# Patient Record
Sex: FEMALE | Race: NATIVE HAWAIIAN OR OTHER PACIFIC ISLANDER | NOT HISPANIC OR LATINO | Employment: UNEMPLOYED | URBAN - METROPOLITAN AREA
[De-identification: names, ages, dates, MRNs, and addresses within clinical notes are randomized per-mention and may not be internally consistent; named-entity substitution may affect disease eponyms.]

---

## 2017-01-07 ENCOUNTER — HOSPITAL ENCOUNTER (EMERGENCY)
Facility: MEDICAL CENTER | Age: 32
End: 2017-01-07
Attending: EMERGENCY MEDICINE
Payer: MEDICAID

## 2017-01-07 ENCOUNTER — APPOINTMENT (OUTPATIENT)
Dept: RADIOLOGY | Facility: MEDICAL CENTER | Age: 32
End: 2017-01-07
Attending: EMERGENCY MEDICINE
Payer: MEDICAID

## 2017-01-07 VITALS
DIASTOLIC BLOOD PRESSURE: 53 MMHG | HEIGHT: 65 IN | BODY MASS INDEX: 25.99 KG/M2 | WEIGHT: 156 LBS | OXYGEN SATURATION: 96 % | RESPIRATION RATE: 24 BRPM | TEMPERATURE: 99 F | HEART RATE: 83 BPM | SYSTOLIC BLOOD PRESSURE: 114 MMHG

## 2017-01-07 DIAGNOSIS — J01.00 ACUTE MAXILLARY SINUSITIS, RECURRENCE NOT SPECIFIED: ICD-10-CM

## 2017-01-07 DIAGNOSIS — F41.9 ANXIETY: ICD-10-CM

## 2017-01-07 DIAGNOSIS — J40 BRONCHITIS: ICD-10-CM

## 2017-01-07 LAB
BLOOD CULTURE HOLD CXBCH: NORMAL
EKG IMPRESSION: NORMAL
FLUAV+FLUBV AG SPEC QL IA: NORMAL
HCG UR QL: NEGATIVE
SIGNIFICANT IND 70042: NORMAL
SITE SITE: NORMAL
SOURCE SOURCE: NORMAL

## 2017-01-07 PROCEDURE — 71010 DX-CHEST-PORTABLE (1 VIEW): CPT

## 2017-01-07 PROCEDURE — 87400 INFLUENZA A/B EACH AG IA: CPT

## 2017-01-07 PROCEDURE — 99284 EMERGENCY DEPT VISIT MOD MDM: CPT

## 2017-01-07 PROCEDURE — A9270 NON-COVERED ITEM OR SERVICE: HCPCS | Performed by: EMERGENCY MEDICINE

## 2017-01-07 PROCEDURE — 700102 HCHG RX REV CODE 250 W/ 637 OVERRIDE(OP): Performed by: EMERGENCY MEDICINE

## 2017-01-07 PROCEDURE — 81025 URINE PREGNANCY TEST: CPT

## 2017-01-07 PROCEDURE — 93005 ELECTROCARDIOGRAM TRACING: CPT

## 2017-01-07 RX ORDER — AZITHROMYCIN 250 MG/1
500 TABLET, FILM COATED ORAL ONCE
Status: COMPLETED | OUTPATIENT
Start: 2017-01-07 | End: 2017-01-07

## 2017-01-07 RX ORDER — PROMETHAZINE HYDROCHLORIDE AND CODEINE PHOSPHATE 6.25; 1 MG/5ML; MG/5ML
5 SYRUP ORAL 4 TIMES DAILY PRN
Qty: 240 ML | Refills: 0 | Status: SHIPPED | OUTPATIENT
Start: 2017-01-07 | End: 2017-03-18

## 2017-01-07 RX ORDER — AZITHROMYCIN 250 MG/1
TABLET, FILM COATED ORAL
Qty: 6 TAB | Refills: 0 | Status: SHIPPED | OUTPATIENT
Start: 2017-01-07 | End: 2017-03-18

## 2017-01-07 RX ADMIN — AZITHROMYCIN 500 MG: 250 TABLET, FILM COATED ORAL at 20:50

## 2017-01-07 ASSESSMENT — PAIN SCALES - GENERAL: PAINLEVEL_OUTOF10: 4

## 2017-01-07 NOTE — ED AVS SNAPSHOT
After Visit Summary                                                                                                                Angelina Rader   MRN: [id number]    Department:  [facility], Emergency Dept   Date of Visit:  1/7/2017            [facility], Emergency Dept    [address]    [place] [address]    Phone:  [phone]      You were seen by     Jay Medley D.O.      Your Diagnosis Was     Acute maxillary sinusitis, recurrence not specified     J01.00       These are the medications you received during your hospitalization from 01/07/2017 1850 to 01/07/2017 2053     Date/Time Order Dose Route Action    01/07/2017 2050 azithromycin (ZITHROMAX) tablet 500 mg 500 mg Oral Given      Follow-up Information     1. Please follow up.    Why:  Follow up with your physician      Medication Information     Review all of your home medications and newly ordered medications with your primary doctor and/or pharmacist as soon as possible. Follow medication instructions as directed by your doctor and/or pharmacist.     Please keep your complete medication list with you and share with your physician. Update the information when medications are discontinued, doses are changed, or new medications (including over-the-counter products) are added; and carry medication information at all times in the event of emergency situations.               Medication List      START taking these medications        Instructions    azithromycin 250 MG Tabs   Commonly known as:  ZITHROMAX    Take two tabs by mouth on day one, then one tab by mouth daily on days 2-5.       promethazine-codeine 6.25-10 MG/5ML Syrp   Commonly known as:  PHENERGAN-CODEINE    Take 5 mL by mouth 4 times a day as needed for Cough.   Dose:  5 mL               Procedures and tests performed during your visit     BLOOD CULTURE,HOLD    DX-CHEST-PORTABLE (1 VIEW)    EKG (ER)    INFLUENZA RAPID    POC URINE PREGNANCY             Discharge Instructions       Bronchitis  Bronchitis is a problem of the air tubes leading to your lungs. This problem makes it hard for air to get in and out of the lungs. You may cough a lot because your air tubes are narrow. Going without care can cause lasting (chronic) bronchitis.  HOME CARE   · Drink enough fluids to keep your pee (urine) clear or pale yellow.  · Use a cool mist humidifier.  · Quit smoking if you smoke. If you keep smoking, the bronchitis might not get better.  · Only take medicine as told by your doctor.  GET HELP RIGHT AWAY IF:   · Coughing keeps you awake.  · You start to wheeze.  · You become more sick or weak.  · You have a hard time breathing or get short of breath.  · You cough up blood.  · Coughing lasts more than 2 weeks.  · You have a fever.  · Your baby is older than 3 months with a rectal temperature of 102° F (38.9° C) or higher.  · Your baby is 3 months old or younger with a rectal temperature of 100.4° F (38° C) or higher.  MAKE SURE YOU:  · Understand these instructions.  · Will watch your condition.  · Will get help right away if you are not doing well or get worse.  Document Released: 06/05/2009 Document Revised: 03/11/2013 Document Reviewed: 11/19/2010  ExitCare® Patient Information ©2014 [employer].    Sinusitis, Adult  Sinusitis is redness, soreness, and inflammation of the paranasal sinuses. Paranasal sinuses are air pockets within the bones of your face. They are located beneath your eyes, in the middle of your forehead, and above your eyes. In healthy paranasal sinuses, mucus is able to drain out, and air is able to circulate through them by way of your nose. However, when your paranasal sinuses are inflamed, mucus and air can become trapped. This can allow bacteria and other germs to grow and cause infection.  Sinusitis can develop quickly and last only a short time (acute) or continue over a long period (chronic). Sinusitis that lasts for more than 12 weeks is  considered chronic.  CAUSES  Causes of sinusitis include:  · Allergies.  · Structural abnormalities, such as displacement of the cartilage that separates your nostrils (deviated septum), which can decrease the air flow through your nose and sinuses and affect sinus drainage.  · Functional abnormalities, such as when the small hairs (cilia) that line your sinuses and help remove mucus do not work properly or are not present.  SIGNS AND SYMPTOMS  Symptoms of acute and chronic sinusitis are the same. The primary symptoms are pain and pressure around the affected sinuses. Other symptoms include:  · Upper toothache.  · Earache.  · Headache.  · Bad breath.  · Decreased sense of smell and taste.  · A cough, which worsens when you are lying flat.  · Fatigue.  · Fever.  · Thick drainage from your nose, which often is green and may contain pus (purulent).  · Swelling and warmth over the affected sinuses.  DIAGNOSIS  Your health care provider will perform a physical exam. During your exam, your health care provider may perform any of the following to help determine if you have acute sinusitis or chronic sinusitis:  · Look in your nose for signs of abnormal growths in your nostrils (nasal polyps).  · Tap over the affected sinus to check for signs of infection.  · View the inside of your sinuses using an imaging device that has a light attached (endoscope).  If your health care provider suspects that you have chronic sinusitis, one or more of the following tests may be recommended:  · Allergy tests.  · Nasal culture. A sample of mucus is taken from your nose, sent to a lab, and screened for bacteria.  · Nasal cytology. A sample of mucus is taken from your nose and examined by your health care provider to determine if your sinusitis is related to an allergy.  TREATMENT  Most cases of acute sinusitis are related to a viral infection and will resolve on their own within 10 days. Sometimes, medicines are prescribed to help relieve  symptoms of both acute and chronic sinusitis. These may include pain medicines, decongestants, nasal steroid sprays, or saline sprays.  However, for sinusitis related to a bacterial infection, your health care provider will prescribe antibiotic medicines. These are medicines that will help kill the bacteria causing the infection.  Rarely, sinusitis is caused by a fungal infection. In these cases, your health care provider will prescribe antifungal medicine.  For some cases of chronic sinusitis, surgery is needed. Generally, these are cases in which sinusitis recurs more than 3 times per year, despite other treatments.  HOME CARE INSTRUCTIONS  · Drink plenty of water. Water helps thin the mucus so your sinuses can drain more easily.  · Use a humidifier.  · Inhale steam 3-4 times a day (for example, sit in the bathroom with the shower running).  · Apply a warm, moist washcloth to your face 3-4 times a day, or as directed by your health care provider.  · Use saline nasal sprays to help moisten and clean your sinuses.  · Take medicines only as directed by your health care provider.  · If you were prescribed either an antibiotic or antifungal medicine, finish it all even if you start to feel better.  SEEK IMMEDIATE MEDICAL CARE IF:  · You have increasing pain or severe headaches.  · You have nausea, vomiting, or drowsiness.  · You have swelling around your face.  · You have vision problems.  · You have a stiff neck.  · You have difficulty breathing.     This information is not intended to replace advice given to you by your health care provider. Make sure you discuss any questions you have with your health care provider.     Document Released: 12/18/2006 Document Revised: 01/08/2016 Document Reviewed: 01/01/2013  Elsevier Interactive Patient Education ©2016 Elsevier Inc.            Patient Information     Patient Information    Following emergency treatment: all patient requiring follow-up care must return either to a  private physician or a clinic if your condition worsens before you are able to obtain further medical attention, please return to the emergency room.     Billing Information    At [facility], we work to make the billing process streamlined for our patients.  Our Representatives are here to answer any questions you may have regarding your hospital bill.  If you have insurance coverage and have supplied your insurance information to us, we will submit a claim to your insurer on your behalf.  Should you have any questions regarding your bill, we can be reached online or by phone as follows:  Online: You are able pay your bills online or live chat with our representatives about any billing questions you may have. We are here to help Monday - Friday from 8:00am to 7:30pm and 9:00am - 12:00pm on Saturdays.  Please visit [email or web address]  for more information.   Phone:  [phone] or [phone]    Please note that your emergency physician, surgeon, pathologist, radiologist, anesthesiologist, and other specialists are not employed by [facility] and will therefore bill separately for their services.  Please contact them directly for any questions concerning their bills at the numbers below:     Emergency Physician Services:  [phone]  [facility]:  [phone]  [facility]:  [phone]  [facility]:  [phone]    1. Your final bill may vary from the amount quoted upon discharge if all procedures are not complete at that time, or if your doctor has additional procedures of which we are not aware. You will receive an additional bill if you return to the Emergency Department at [facility] for suture removal regardless of the facility of which the sutures were placed.     2. Please arrange for settlement of this account at the emergency registration.    3. All self-pay accounts are due in full at the time of treatment.  If you  are unable to meet this obligation then payment is expected within 4-5 days.     4. If you have had radiology studies (CT, X-ray, Ultrasound, MRI), you have received a preliminary result during your emergency department visit. Please contact the radiology department [phone] to receive a copy of your final result. Please discuss the Final result with your primary physician or with the follow up physician provided.     Crisis Hotline:  [place] Crisis Hotline:  3-593-UXEXIME or 1-849.713.5731  [place] Crisis Hotline:    1-126.779.4717 or 650-180-0541         ED Discharge Follow Up Questions    1. In order to provide you with very good care, we would like to follow up with a phone call in the next few days.  May we have your permission to contact you?     YES /  NO    2. What is the best phone number to call you? (       )_____-__________    3. What is the best time to call you?      Morning  /  Afternoon  /  Evening                   Patient Signature:  ____________________________________________________________    Date:  ____________________________________________________________

## 2017-01-07 NOTE — ED AVS SNAPSHOT
1/7/2017          Angelina Rader  [address]  [place] [address]    Dear Angelina:    [facility] wants to ensure your discharge home is safe and you or your loved ones have had all your questions answered regarding your care after you leave the hospital.    You may receive a telephone call within two days of your discharge.  This call is to make certain you understand your discharge instructions as well as ensure we provided you with the best care possible during your stay with us.     The call will only last approximately 3-5 minutes and will be done by a nurse.    Once again, we want to ensure your discharge home is safe and that you have a clear understanding of any next steps in your care.  If you have any questions or concerns, please do not hesitate to contact us, we are here for you.  Thank you for choosing [facility] for your healthcare needs.    Sincerely,    Reno Massey    [facility]

## 2017-01-07 NOTE — ED AVS SNAPSHOT
CinemaNow Access Code: [email or web address]  Expires: 2/6/2017  8:53 PM    CinemaNow  A secure, online tool to manage your health information     [employer]’s CinemaNow® is a secure, online tool that connects you to your personalized health information from the privacy of your home -- day or night - making it very easy for you to manage your healthcare. Once the activation process is completed, you can even access your medical information using the CinemaNow apryl, which is available for free in the Apple Apryl store or Google Play store.     CinemaNow provides the following levels of access (as shown below):   My Chart Features   [facility] Primary Care Doctor [facility]  Specialists [facility]  Urgent  Care Non-[facility]  Primary Care  Doctor   Email your healthcare team securely and privately 24/7 X X X X   Manage appointments: schedule your next appointment; view details of past/upcoming appointments X      Request prescription refills. X      View recent personal medical records, including lab and immunizations X X X X   View health record, including health history, allergies, medications X X X X   Read reports about your outpatient visits, procedures, consult and ER notes X X X X   See your discharge summary, which is a recap of your hospital and/or ER visit that includes your diagnosis, lab results, and care plan. X X       How to register for CinemaNow:  1. Go to  [email or web address].  2. Click on the Sign Up Now box, which takes you to the New Member Sign Up page. You will need to provide the following information:  a. Enter your CinemaNow Access Code exactly as it appears at the top of this page. (You will not need to use this code after you’ve completed the sign-up process. If you do not sign up before the expiration date, you must request a new code.)   b. Enter your date of birth.   c. Enter your home email address.   d. Click Submit, and follow the next screen’s instructions.  3. Create a CinemaNow ID. This will be your CinemaNow  login ID and cannot be changed, so think of one that is secure and easy to remember.  4. Create a [employer] password. You can change your password at any time.  5. Enter your Password Reset Question and Answer. This can be used at a later time if you forget your password.   6. Enter your e-mail address. This allows you to receive e-mail notifications when new information is available in [employer].  7. Click Sign Up. You can now view your health information.    For assistance activating your [employer] account, call [phone]

## 2017-01-08 ENCOUNTER — PATIENT OUTREACH (OUTPATIENT)
Dept: HEALTH INFORMATION MANAGEMENT | Facility: OTHER | Age: 32
End: 2017-01-08

## 2017-01-08 NOTE — DISCHARGE INSTRUCTIONS
Bronchitis  Bronchitis is a problem of the air tubes leading to your lungs. This problem makes it hard for air to get in and out of the lungs. You may cough a lot because your air tubes are narrow. Going without care can cause lasting (chronic) bronchitis.  HOME CARE   · Drink enough fluids to keep your pee (urine) clear or pale yellow.  · Use a cool mist humidifier.  · Quit smoking if you smoke. If you keep smoking, the bronchitis might not get better.  · Only take medicine as told by your doctor.  GET HELP RIGHT AWAY IF:   · Coughing keeps you awake.  · You start to wheeze.  · You become more sick or weak.  · You have a hard time breathing or get short of breath.  · You cough up blood.  · Coughing lasts more than 2 weeks.  · You have a fever.  · Your baby is older than 3 months with a rectal temperature of 102° F (38.9° C) or higher.  · Your baby is 3 months old or younger with a rectal temperature of 100.4° F (38° C) or higher.  MAKE SURE YOU:  · Understand these instructions.  · Will watch your condition.  · Will get help right away if you are not doing well or get worse.  Document Released: 06/05/2009 Document Revised: 03/11/2013 Document Reviewed: 11/19/2010  ExitCare® Patient Information ©2014 [employer].    Sinusitis, Adult  Sinusitis is redness, soreness, and inflammation of the paranasal sinuses. Paranasal sinuses are air pockets within the bones of your face. They are located beneath your eyes, in the middle of your forehead, and above your eyes. In healthy paranasal sinuses, mucus is able to drain out, and air is able to circulate through them by way of your nose. However, when your paranasal sinuses are inflamed, mucus and air can become trapped. This can allow bacteria and other germs to grow and cause infection.  Sinusitis can develop quickly and last only a short time (acute) or continue over a long period (chronic). Sinusitis that lasts for more than 12 weeks is considered  chronic.  CAUSES  Causes of sinusitis include:  · Allergies.  · Structural abnormalities, such as displacement of the cartilage that separates your nostrils (deviated septum), which can decrease the air flow through your nose and sinuses and affect sinus drainage.  · Functional abnormalities, such as when the small hairs (cilia) that line your sinuses and help remove mucus do not work properly or are not present.  SIGNS AND SYMPTOMS  Symptoms of acute and chronic sinusitis are the same. The primary symptoms are pain and pressure around the affected sinuses. Other symptoms include:  · Upper toothache.  · Earache.  · Headache.  · Bad breath.  · Decreased sense of smell and taste.  · A cough, which worsens when you are lying flat.  · Fatigue.  · Fever.  · Thick drainage from your nose, which often is green and may contain pus (purulent).  · Swelling and warmth over the affected sinuses.  DIAGNOSIS  Your health care provider will perform a physical exam. During your exam, your health care provider may perform any of the following to help determine if you have acute sinusitis or chronic sinusitis:  · Look in your nose for signs of abnormal growths in your nostrils (nasal polyps).  · Tap over the affected sinus to check for signs of infection.  · View the inside of your sinuses using an imaging device that has a light attached (endoscope).  If your health care provider suspects that you have chronic sinusitis, one or more of the following tests may be recommended:  · Allergy tests.  · Nasal culture. A sample of mucus is taken from your nose, sent to a lab, and screened for bacteria.  · Nasal cytology. A sample of mucus is taken from your nose and examined by your health care provider to determine if your sinusitis is related to an allergy.  TREATMENT  Most cases of acute sinusitis are related to a viral infection and will resolve on their own within 10 days. Sometimes, medicines are prescribed to help relieve symptoms of  both acute and chronic sinusitis. These may include pain medicines, decongestants, nasal steroid sprays, or saline sprays.  However, for sinusitis related to a bacterial infection, your health care provider will prescribe antibiotic medicines. These are medicines that will help kill the bacteria causing the infection.  Rarely, sinusitis is caused by a fungal infection. In these cases, your health care provider will prescribe antifungal medicine.  For some cases of chronic sinusitis, surgery is needed. Generally, these are cases in which sinusitis recurs more than 3 times per year, despite other treatments.  HOME CARE INSTRUCTIONS  · Drink plenty of water. Water helps thin the mucus so your sinuses can drain more easily.  · Use a humidifier.  · Inhale steam 3-4 times a day (for example, sit in the bathroom with the shower running).  · Apply a warm, moist washcloth to your face 3-4 times a day, or as directed by your health care provider.  · Use saline nasal sprays to help moisten and clean your sinuses.  · Take medicines only as directed by your health care provider.  · If you were prescribed either an antibiotic or antifungal medicine, finish it all even if you start to feel better.  SEEK IMMEDIATE MEDICAL CARE IF:  · You have increasing pain or severe headaches.  · You have nausea, vomiting, or drowsiness.  · You have swelling around your face.  · You have vision problems.  · You have a stiff neck.  · You have difficulty breathing.     This information is not intended to replace advice given to you by your health care provider. Make sure you discuss any questions you have with your health care provider.     Document Released: 12/18/2006 Document Revised: 01/08/2016 Document Reviewed: 01/01/2013  [employer] Interactive Patient Education ©2016 [employer]

## 2017-01-08 NOTE — ED PROVIDER NOTES
ED Provider Note    CHIEF COMPLAINT  Chief Complaint   Patient presents with   • Shortness of Breath   • Cough   • Diarrhea       HPI  Angelina Rader is a 31 y.o. female who presents to emergency room today with complaints of anxiety, cough, sore throat, congestion. Patient states that multiple family members are sick with similar symptoms. He started 2 weeks ago. She was initially seen at her clinic and placed on antibiotics which she does not know the name but she only took one a day instead of how her doctor instructed her and was initially better and stopped using her medication. Now she is worse again and complaint of sore throat, congestion and difficulty with anxiety and sleeping. Sinus congestion    REVIEW OF SYSTEMS  See HPI for further details. All other systems are negative.      PAST MEDICAL HISTORY  Past Medical History   Diagnosis Date   • Urinary tract infection, site not specified    • Psychiatric problem      Anxiety       FAMILY HISTORY  History reviewed. No pertinent family history.    SOCIAL HISTORY  Social History     Social History   • Marital Status: Single     Spouse Name: N/A   • Number of Children: N/A   • Years of Education: N/A     Social History Main Topics   • Smoking status: Former Smoker -- 9 years     Quit date: 12/04/2014   • Smokeless tobacco: None      Comment: Pt. states quit when she found out she was pregnant.    • Alcohol Use: No   • Drug Use: No   • Sexual Activity:     Partners: Male      Comment: None     Other Topics Concern   • None     Social History Narrative       SURGICAL HISTORY  Past Surgical History   Procedure Laterality Date   • Dilation and curettage  2011     due to sab   • Abdominal exploration  2013   • Cholecystectomy     • Primary c section  5/25/2015     Procedure: PRIMARY C SECTION;  Surgeon: Regino Cook M.D.;  Location: LABOR AND DELIVERY;  Service:        CURRENT MEDICATIONS  Home Medications     Reviewed by Blanca Douglass R.N. (Registered Nurse)  "on 01/07/17 at 1910  Med List Status: Complete    Medication Last Dose Status          Patient Darin Taking any Medications                        ALLERGIES  No Known Allergies    PHYSICAL EXAM  VITAL SIGNS: /53 mmHg  Pulse 83  Temp(Src) 37.2 °C (99 °F)  Resp 24  Ht 1.651 m (5' 5\")  Wt 70.761 kg (156 lb)  BMI 25.96 kg/m2  SpO2 96%  LMP 08/28/2014 Room air O2: 95    Constitutional: Well developed, Well nourished, No acute distress, Non-toxic appearance.   HENT: Normocephalic, Atraumatic, Bilateral external ears normal, Oropharynx moist, No oral exudates, Nose normal. Pharynx mildly injected tenderness over the maxillary sinuses  Eyes: PERRLA, EOMI, Conjunctiva normal, No discharge.   Neck: Normal range of motion, No tenderness, Supple, No stridor.   Lymphatic: No lymphadenopathy noted.   Cardiovascular: Normal heart rate, Normal rhythm, No murmurs, No rubs, No gallops.   Thorax & Lungs: Normal breath sounds, No respiratory distress, No wheezing, No chest tenderness.   Abdomen: Bowel sounds normal, Soft, No tenderness, No masses, No pulsatile masses.    Skin: Warm, Dry, No erythema, No rash.   Back: No tenderness, No CVA tenderness.     RADIOLOGY/PROCEDURES  DX-CHEST-PORTABLE (1 VIEW)   Final Result      No acute cardiopulmonary abnormality.            EKG; normal sinus rhythm 80 beats per minute, no ST elevation or depression, no widening of the QRS complex, good R-wave progression, IL intervals are normal, no diagnostic Q waves noted, this is a 12-lead EKG there is no previous EKG available at this time for comparison.    COURSE & MEDICAL DECISION MAKING  Pertinent Labs & Imaging studies reviewed. (See chart for details)  Patient has normal influenza test. Chest x-ray shows no acute process. She has history of anxiety disorder but she feels that this is made worse by her symptoms and usually she does not have this problem. We discussed follow-up with her primary care physician. Placed on cough syrup " which will help her sleep and placed on Zithromax. I discussed the need to take medications as prescribed. Patient has been stable throughout her stay here was discharged in stable condition with above instructions.    FINAL IMPRESSION  1. bronchitis  2. Sinusitis  3. Anxiety      Electronically signed by: Jay Medley, 1/7/2017 9:53 PM

## 2017-01-08 NOTE — ED NOTES
Pt remains on cell phone, pt informed of need for urine does not get off cell phone to give sample

## 2017-01-08 NOTE — ED NOTES
.Pt discharged in stable condition, ambulatory to waiting room in stable condition with family, all belongings with pt, given written and verbal dc instructions no questions voiced

## 2017-01-08 NOTE — ED NOTES
BIB EMS from [facility], patient c/sob, nonproductive cough x 3 days, diarrhea, insomnia, no nausea/vomiting, recvd albuterol nppb pta w some improvement of sx, ho past medical hx

## 2017-01-15 ENCOUNTER — HOSPITAL ENCOUNTER (EMERGENCY)
Facility: MEDICAL CENTER | Age: 32
End: 2017-01-15
Attending: EMERGENCY MEDICINE
Payer: MEDICAID

## 2017-01-15 ENCOUNTER — APPOINTMENT (OUTPATIENT)
Dept: RADIOLOGY | Facility: MEDICAL CENTER | Age: 32
End: 2017-01-15
Attending: EMERGENCY MEDICINE
Payer: MEDICAID

## 2017-01-15 VITALS
TEMPERATURE: 98 F | BODY MASS INDEX: 25.71 KG/M2 | HEART RATE: 72 BPM | SYSTOLIC BLOOD PRESSURE: 123 MMHG | OXYGEN SATURATION: 98 % | DIASTOLIC BLOOD PRESSURE: 73 MMHG | RESPIRATION RATE: 18 BRPM | HEIGHT: 64 IN | WEIGHT: 150.57 LBS

## 2017-01-15 DIAGNOSIS — J06.9 UPPER RESPIRATORY TRACT INFECTION, UNSPECIFIED TYPE: ICD-10-CM

## 2017-01-15 DIAGNOSIS — F41.9 ANXIETY: ICD-10-CM

## 2017-01-15 LAB
ALBUMIN SERPL BCP-MCNC: 4.2 G/DL (ref 3.2–4.9)
ALBUMIN/GLOB SERPL: 1.2 G/DL
ALP SERPL-CCNC: 55 U/L (ref 30–99)
ALT SERPL-CCNC: 9 U/L (ref 2–50)
AMPHET UR QL SCN: NEGATIVE
ANION GAP SERPL CALC-SCNC: 7 MMOL/L (ref 0–11.9)
APPEARANCE UR: CLEAR
AST SERPL-CCNC: 12 U/L (ref 12–45)
BARBITURATES UR QL SCN: NEGATIVE
BASOPHILS # BLD AUTO: 0.2 % (ref 0–1.8)
BASOPHILS # BLD: 0.02 K/UL (ref 0–0.12)
BENZODIAZ UR QL SCN: NEGATIVE
BILIRUB SERPL-MCNC: 0.8 MG/DL (ref 0.1–1.5)
BILIRUB UR QL STRIP.AUTO: NEGATIVE
BUN SERPL-MCNC: 5 MG/DL (ref 8–22)
BZE UR QL SCN: NEGATIVE
CALCIUM SERPL-MCNC: 9.4 MG/DL (ref 8.5–10.5)
CANNABINOIDS UR QL SCN: NEGATIVE
CHLORIDE SERPL-SCNC: 109 MMOL/L (ref 96–112)
CO2 SERPL-SCNC: 22 MMOL/L (ref 20–33)
COLOR UR: YELLOW
CREAT SERPL-MCNC: 0.54 MG/DL (ref 0.5–1.4)
CULTURE IF INDICATED INDCX: NO UA CULTURE
EOSINOPHIL # BLD AUTO: 0.07 K/UL (ref 0–0.51)
EOSINOPHIL NFR BLD: 0.8 % (ref 0–6.9)
ERYTHROCYTE [DISTWIDTH] IN BLOOD BY AUTOMATED COUNT: 45.7 FL (ref 35.9–50)
GFR SERPL CREATININE-BSD FRML MDRD: >60 ML/MIN/1.73 M 2
GLOBULIN SER CALC-MCNC: 3.6 G/DL (ref 1.9–3.5)
GLUCOSE SERPL-MCNC: 91 MG/DL (ref 65–99)
GLUCOSE UR STRIP.AUTO-MCNC: NEGATIVE MG/DL
HCG SERPL QL: NEGATIVE
HCT VFR BLD AUTO: 42.2 % (ref 37–47)
HGB BLD-MCNC: 14.3 G/DL (ref 12–16)
IMM GRANULOCYTES # BLD AUTO: 0.02 K/UL (ref 0–0.11)
IMM GRANULOCYTES NFR BLD AUTO: 0.2 % (ref 0–0.9)
KETONES UR STRIP.AUTO-MCNC: 100 MG/DL
LEUKOCYTE ESTERASE UR QL STRIP.AUTO: NEGATIVE
LYMPHOCYTES # BLD AUTO: 3.01 K/UL (ref 1–4.8)
LYMPHOCYTES NFR BLD: 34.6 % (ref 22–41)
MCH RBC QN AUTO: 29.9 PG (ref 27–33)
MCHC RBC AUTO-ENTMCNC: 33.9 G/DL (ref 33.6–35)
MCV RBC AUTO: 88.1 FL (ref 81.4–97.8)
MDMA UR QL SCN: NEGATIVE
METHADONE UR QL SCN: NEGATIVE
MICRO URNS: ABNORMAL
MONOCYTES # BLD AUTO: 0.44 K/UL (ref 0–0.85)
MONOCYTES NFR BLD AUTO: 5.1 % (ref 0–13.4)
NEUTROPHILS # BLD AUTO: 5.15 K/UL (ref 2–7.15)
NEUTROPHILS NFR BLD: 59.1 % (ref 44–72)
NITRITE UR QL STRIP.AUTO: NEGATIVE
NRBC # BLD AUTO: 0 K/UL
NRBC BLD AUTO-RTO: 0 /100 WBC
OPIATES UR QL SCN: NEGATIVE
OXYCODONE UR QL SCN: NEGATIVE
PCP UR QL SCN: NEGATIVE
PH UR STRIP.AUTO: 6 [PH]
PLATELET # BLD AUTO: 217 K/UL (ref 164–446)
PMV BLD AUTO: 9.3 FL (ref 9–12.9)
POTASSIUM SERPL-SCNC: 3.5 MMOL/L (ref 3.6–5.5)
PROPOXYPH UR QL SCN: NEGATIVE
PROT SERPL-MCNC: 7.8 G/DL (ref 6–8.2)
PROT UR QL STRIP: NEGATIVE MG/DL
RBC # BLD AUTO: 4.79 M/UL (ref 4.2–5.4)
RBC UR QL AUTO: NEGATIVE
SODIUM SERPL-SCNC: 138 MMOL/L (ref 135–145)
SP GR UR STRIP.AUTO: 1.01
WBC # BLD AUTO: 8.7 K/UL (ref 4.8–10.8)

## 2017-01-15 PROCEDURE — 70450 CT HEAD/BRAIN W/O DYE: CPT

## 2017-01-15 PROCEDURE — 700102 HCHG RX REV CODE 250 W/ 637 OVERRIDE(OP): Performed by: EMERGENCY MEDICINE

## 2017-01-15 PROCEDURE — 80053 COMPREHEN METABOLIC PANEL: CPT

## 2017-01-15 PROCEDURE — 99284 EMERGENCY DEPT VISIT MOD MDM: CPT

## 2017-01-15 PROCEDURE — 80307 DRUG TEST PRSMV CHEM ANLYZR: CPT

## 2017-01-15 PROCEDURE — 84703 CHORIONIC GONADOTROPIN ASSAY: CPT

## 2017-01-15 PROCEDURE — 81003 URINALYSIS AUTO W/O SCOPE: CPT | Mod: 59

## 2017-01-15 PROCEDURE — 85025 COMPLETE CBC W/AUTO DIFF WBC: CPT

## 2017-01-15 PROCEDURE — 36415 COLL VENOUS BLD VENIPUNCTURE: CPT

## 2017-01-15 PROCEDURE — A9270 NON-COVERED ITEM OR SERVICE: HCPCS | Performed by: EMERGENCY MEDICINE

## 2017-01-15 RX ORDER — DIAZEPAM 5 MG/1
5 TABLET ORAL EVERY 6 HOURS PRN
Qty: 10 TAB | Refills: 0 | Status: SHIPPED | OUTPATIENT
Start: 2017-01-15 | End: 2017-03-18

## 2017-01-15 RX ORDER — POTASSIUM CHLORIDE 750 MG/1
20 TABLET, FILM COATED, EXTENDED RELEASE ORAL ONCE
Status: COMPLETED | OUTPATIENT
Start: 2017-01-15 | End: 2017-01-15

## 2017-01-15 RX ADMIN — POTASSIUM CHLORIDE 20 MEQ: 750 TABLET, FILM COATED, EXTENDED RELEASE ORAL at 20:41

## 2017-01-15 ASSESSMENT — PAIN SCALES - GENERAL: PAINLEVEL_OUTOF10: 8

## 2017-01-15 NOTE — ED AVS SNAPSHOT
1/15/2017          Angelina Rader  [address]  [place] [address]    Dear Angelina:    [facility] wants to ensure your discharge home is safe and you or your loved ones have had all your questions answered regarding your care after you leave the hospital.    You may receive a telephone call within two days of your discharge.  This call is to make certain you understand your discharge instructions as well as ensure we provided you with the best care possible during your stay with us.     The call will only last approximately 3-5 minutes and will be done by a nurse.    Once again, we want to ensure your discharge home is safe and that you have a clear understanding of any next steps in your care.  If you have any questions or concerns, please do not hesitate to contact us, we are here for you.  Thank you for choosing [facility] for your healthcare needs.    Sincerely,    Reno Massey    [facility]

## 2017-01-15 NOTE — ED AVS SNAPSHOT
Innolight Access Code: [email or web address]  Expires: 2/6/2017  8:53 PM    Your email address is not on file at [facility].  Email Addresses are required for you to sign up for Innolight, please contact [phone] to verify your personal information and to provide your email address prior to attempting to register for Innolight.    Angelina Rader  [address]  [place] [address]    Innolight  A secure, online tool to manage your health information     [facility]’s Innolight® is a secure, online tool that connects you to your personalized health information from the privacy of your home -- day or night - making it very easy for you to manage your healthcare. Once the activation process is completed, you can even access your medical information using the Innolight apryl, which is available for free in the Apple Apryl store or Google Play store.     To learn more about Innolight, visit [email or web address]    There are two levels of access available (as shown below):   My Chart Features  [facility] Primary Care Doctor [facility]  Specialists [facility]  Urgent  Care Non-[facility] Primary Care Doctor   Email your healthcare team securely and privately 24/7 X X X    Manage appointments: schedule your next appointment; view details of past/upcoming appointments X      Request prescription refills. X      View recent personal medical records, including lab and immunizations X X X X   View health record, including health history, allergies, medications X X X X   Read reports about your outpatient visits, procedures, consult and ER notes X X X X   See your discharge summary, which is a recap of your hospital and/or ER visit that includes your diagnosis, lab results, and care plan X X  X     How to register for Innolight:  Once your e-mail address has been verified, follow the following steps to sign up for Innolight.     1. Go to  [email or web address]  2. Click on the Sign Up Now box, which takes you to the New Member Sign Up page. You will need to  provide the following information:  a. Enter your Roamer Access Code exactly as it appears at the top of this page. (You will not need to use this code after you’ve completed the sign-up process. If you do not sign up before the expiration date, you must request a new code.)   b. Enter your date of birth.   c. Enter your home email address.   d. Click Submit, and follow the next screen’s instructions.  3. Create a Roamer ID. This will be your Roamer login ID and cannot be changed, so think of one that is secure and easy to remember.  4. Create a Roamer password. You can change your password at any time.  5. Enter your Password Reset Question and Answer. This can be used at a later time if you forget your password.   6. Enter your e-mail address. This allows you to receive e-mail notifications when new information is available in Roamer.  7. Click Sign Up. You can now view your health information.    For assistance activating your Roamer account, call [phone]

## 2017-01-15 NOTE — ED AVS SNAPSHOT
After Visit Summary                                                                                                                Angelina Rader   MRN: [id number]    Department:  [facility], Emergency Dept   Date of Visit:  1/15/2017            [facility], Emergency Dept    [address]    [place] [address]    Phone:  [phone]      You were seen by     Jay Medley D.O.      Your Diagnosis Was     Upper respiratory tract infection, unspecified type     J06.9       These are the medications you received during your hospitalization from 01/15/2017 1611 to 01/15/2017 2055     Date/Time Order Dose Route Action    01/15/2017 2041 potassium chloride ER (KLOR-CON) tablet 20 mEq 20 mEq Oral Given      Follow-up Information     1. Schedule an appointment as soon as possible for a visit with Anayeli Gonzalez M.D..    Specialty:  Family Medicine    Contact information    [address]  [place] [address] [phone]        Medication Information     Review all of your home medications and newly ordered medications with your primary doctor and/or pharmacist as soon as possible. Follow medication instructions as directed by your doctor and/or pharmacist.     Please keep your complete medication list with you and share with your physician. Update the information when medications are discontinued, doses are changed, or new medications (including over-the-counter products) are added; and carry medication information at all times in the event of emergency situations.               Medication List      START taking these medications        Instructions    diazepam 5 MG Tabs   Commonly known as:  VALIUM    Take 1 Tab by mouth every 6 hours as needed for Sleep.   Dose:  5 mg         ASK your doctor about these medications        Instructions    azithromycin 250 MG Tabs   Commonly known as:  ZITHROMAX    Take two tabs by mouth on day one, then one tab by mouth daily on days 2-5.    "      promethazine-codeine 6.25-10 MG/5ML Syrp   Commonly known as:  PHENERGAN-CODEINE    Take 5 mL by mouth 4 times a day as needed for Cough.   Dose:  5 mL               Procedures and tests performed during your visit     CBC WITH DIFFERENTIAL    COMP METABOLIC PANEL    CT-HEAD W/O    ESTIMATED GFR    HCG QUAL SERUM    URINALYSIS,CULTURE IF INDICATED    URINE DRUG SCREEN        Discharge Instructions       Upper Respiratory Infection, Adult  Most upper respiratory infections (URIs) are a viral infection of the air passages leading to the lungs. A URI affects the nose, throat, and upper air passages. The most common type of URI is nasopharyngitis and is typically referred to as \"the common cold.\"  URIs run their course and usually go away on their own. Most of the time, a URI does not require medical attention, but sometimes a bacterial infection in the upper airways can follow a viral infection. This is called a secondary infection. Sinus and middle ear infections are common types of secondary upper respiratory infections.  Bacterial pneumonia can also complicate a URI. A URI can worsen asthma and chronic obstructive pulmonary disease (COPD). Sometimes, these complications can require emergency medical care and may be life threatening.   CAUSES  Almost all URIs are caused by viruses. A virus is a type of germ and can spread from one person to another.   RISKS FACTORS  You may be at risk for a URI if:   · You smoke.    · You have chronic heart or lung disease.  · You have a weakened defense (immune) system.    · You are very young or very old.    · You have nasal allergies or asthma.  · You work in crowded or poorly ventilated areas.  · You work in health care facilities or schools.  SIGNS AND SYMPTOMS   Symptoms typically develop 2-3 days after you come in contact with a cold virus. Most viral URIs last 7-10 days. However, viral URIs from the influenza virus (flu virus) can last 14-18 days and are typically more " severe. Symptoms may include:   · Runny or stuffy (congested) nose.    · Sneezing.    · Cough.    · Sore throat.    · Headache.    · Fatigue.    · Fever.    · Loss of appetite.    · Pain in your forehead, behind your eyes, and over your cheekbones (sinus pain).  · Muscle aches.    DIAGNOSIS   Your health care provider may diagnose a URI by:  · Physical exam.  · Tests to check that your symptoms are not due to another condition such as:  ¨ Strep throat.  ¨ Sinusitis.  ¨ Pneumonia.  ¨ Asthma.  TREATMENT   A URI goes away on its own with time. It cannot be cured with medicines, but medicines may be prescribed or recommended to relieve symptoms. Medicines may help:  · Reduce your fever.  · Reduce your cough.  · Relieve nasal congestion.  HOME CARE INSTRUCTIONS   · Take medicines only as directed by your health care provider.    · Gargle warm saltwater or take cough drops to comfort your throat as directed by your health care provider.  · Use a warm mist humidifier or inhale steam from a shower to increase air moisture. This may make it easier to breathe.  · Drink enough fluid to keep your urine clear or pale yellow.    · Eat soups and other clear broths and maintain good nutrition.    · Rest as needed.    · Return to work when your temperature has returned to normal or as your health care provider advises. You may need to stay home longer to avoid infecting others. You can also use a face mask and careful hand washing to prevent spread of the virus.  · Increase the usage of your inhaler if you have asthma.    · Do not use any tobacco products, including cigarettes, chewing tobacco, or electronic cigarettes. If you need help quitting, ask your health care provider.  PREVENTION   The best way to protect yourself from getting a cold is to practice good hygiene.   · Avoid oral or hand contact with people with cold symptoms.    · Wash your hands often if contact occurs.    There is no clear evidence that vitamin C, vitamin  E, echinacea, or exercise reduces the chance of developing a cold. However, it is always recommended to get plenty of rest, exercise, and practice good nutrition.   SEEK MEDICAL CARE IF:   · You are getting worse rather than better.    · Your symptoms are not controlled by medicine.    · You have chills.  · You have worsening shortness of breath.  · You have brown or red mucus.  · You have yellow or brown nasal discharge.  · You have pain in your face, especially when you bend forward.  · You have a fever.  · You have swollen neck glands.  · You have pain while swallowing.  · You have white areas in the back of your throat.  SEEK IMMEDIATE MEDICAL CARE IF:   · You have severe or persistent:  ¨ Headache.  ¨ Ear pain.  ¨ Sinus pain.  ¨ Chest pain.  · You have chronic lung disease and any of the following:  ¨ Wheezing.  ¨ Prolonged cough.  ¨ Coughing up blood.  ¨ A change in your usual mucus.  · You have a stiff neck.  · You have changes in your:  ¨ Vision.  ¨ Hearing.  ¨ Thinking.  ¨ Mood.  MAKE SURE YOU:   · Understand these instructions.  · Will watch your condition.  · Will get help right away if you are not doing well or get worse.     This information is not intended to replace advice given to you by your health care provider. Make sure you discuss any questions you have with your health care provider.     Document Released: 06/13/2002 Document Revised: 05/03/2016 Document Reviewed: 03/25/2015  [employer] Interactive Patient Education ©2016 [employer]            Patient Information     Patient Information    Following emergency treatment: all patient requiring follow-up care must return either to a private physician or a clinic if your condition worsens before you are able to obtain further medical attention, please return to the emergency room.     Billing Information    At [facility], we work to make the billing process streamlined for our patients.  Our Representatives are here to answer any questions you  may have regarding your hospital bill.  If you have insurance coverage and have supplied your insurance information to us, we will submit a claim to your insurer on your behalf.  Should you have any questions regarding your bill, we can be reached online or by phone as follows:  Online: You are able pay your bills online or live chat with our representatives about any billing questions you may have. We are here to help Monday - Friday from 8:00am to 7:30pm and 9:00am - 12:00pm on Saturdays.  Please visit [email or web address]  for more information.   Phone:  [phone] or [phone]    Please note that your emergency physician, surgeon, pathologist, radiologist, anesthesiologist, and other specialists are not employed by [facility] and will therefore bill separately for their services.  Please contact them directly for any questions concerning their bills at the numbers below:     Emergency Physician Services:  [phone]  [facility]:  [phone]  [facility]:  [phone]  [facility]:  [phone]    1. Your final bill may vary from the amount quoted upon discharge if all procedures are not complete at that time, or if your doctor has additional procedures of which we are not aware. You will receive an additional bill if you return to the Emergency Department at [facility] for suture removal regardless of the facility of which the sutures were placed.     2. Please arrange for settlement of this account at the emergency registration.    3. All self-pay accounts are due in full at the time of treatment.  If you are unable to meet this obligation then payment is expected within 4-5 days.     4. If you have had radiology studies (CT, X-ray, Ultrasound, MRI), you have received a preliminary result during your emergency department visit. Please contact the radiology department [phone] to receive a copy of your final  result. Please discuss the Final result with your primary physician or with the follow up physician provided.     Crisis Hotline:  [place] Crisis Hotline:  1-760-BHMULEA or 1-901.582.8093  [place] Crisis Hotline:    1-897.487.9622 or 501-979-3250         ED Discharge Follow Up Questions    1. In order to provide you with very good care, we would like to follow up with a phone call in the next few days.  May we have your permission to contact you?     YES /  NO    2. What is the best phone number to call you? (       )_____-__________    3. What is the best time to call you?      Morning  /  Afternoon  /  Evening                   Patient Signature:  ____________________________________________________________    Date:  ____________________________________________________________

## 2017-01-16 NOTE — ED NOTES
30 y/o female ambulatory to triage with c/o headache and intermittent episodes of dizziness x 2-3 days. Pt denies having history of headaches. Pt states she is unable to sleep, tearful during triage.

## 2017-01-16 NOTE — ED NOTES
Explained d/c instructions/gave scripts to patient and partner.  Patient ambulated to lobby with steady gait.  VSS at time of discharge.  Emphasized the importance of follow up care with a PCP.

## 2017-01-16 NOTE — ED PROVIDER NOTES
ED Provider Note    CHIEF COMPLAINT  Chief Complaint   Patient presents with   • Headache   • Dizziness       HPI  Angelina Rader is a 31 y.o. female who presents to the emergency room today with complaints anxiety, dizziness and headache. Patient has history recent sinus infection currently being treated with antibiotics. Headache is over her maxillary sinuses bilaterally she also is very anxious and having trouble sleeping has a history of anxiety. Denies chest pain, shortness breath she said chills but no fever at this time. No nausea vomiting or other complaints.    REVIEW OF SYSTEMS  See HPI for further details. All other systems are negative.     PAST MEDICAL HISTORY  Past Medical History   Diagnosis Date   • Urinary tract infection, site not specified    • Psychiatric problem      Anxiety       FAMILY HISTORY  [unfilled]    SOCIAL HISTORY  Social History     Social History   • Marital Status: Single     Spouse Name: N/A   • Number of Children: N/A   • Years of Education: N/A     Social History Main Topics   • Smoking status: Former Smoker -- 9 years     Quit date: 12/04/2014   • Smokeless tobacco: Not on file      Comment: Pt. states quit when she found out she was pregnant.    • Alcohol Use: No   • Drug Use: No   • Sexual Activity:     Partners: Male      Comment: None     Other Topics Concern   • Not on file     Social History Narrative       SURGICAL HISTORY  Past Surgical History   Procedure Laterality Date   • Dilation and curettage  2011     due to sab   • Abdominal exploration  2013   • Cholecystectomy     • Primary c section  5/25/2015     Procedure: PRIMARY C SECTION;  Surgeon: Regino Cook M.D.;  Location: LABOR AND DELIVERY;  Service:        CURRENT MEDICATIONS  Home Medications     **Home medications have not yet been reviewed for this encounter**          ALLERGIES  No Known Allergies    PHYSICAL EXAM  VITAL SIGNS: /73 mmHg  Pulse 72  Temp(Src) 36.7 °C (98 °F) (Temporal)  Resp 18  Ht  "1.626 m (5' 4\")  Wt 68.3 kg (150 lb 9.2 oz)  BMI 25.83 kg/m2  SpO2 98%  LMP 08/28/2014      Constitutional: Well developed, Well nourished, No acute distress, Non-toxic appearance.   HENT: Normocephalic, Atraumatic, Bilateral external ears normal, Oropharynx moist, No oral exudates, Nose normal.   Eyes: PERRLA, EOMI, Conjunctiva normal, No discharge.   Neck: Normal range of motion, No tenderness, Supple, No stridor.   Lymphatic: No lymphadenopathy noted.   Cardiovascular: Normal heart rate, Normal rhythm, No murmurs, No rubs, No gallops.   Thorax & Lungs: Normal breath sounds, No respiratory distress, No wheezing, No chest tenderness.   Abdomen: Bowel sounds normal, Soft, No tenderness, No masses, No pulsatile masses.   Skin: Warm, Dry, No erythema, No rash.   Back: No tenderness, No CVA tenderness.      Extremities: Intact distal pulses, No edema, No tenderness, No cyanosis, No clubbing.   Musculoskeletal: Good range of motion in all major joints. No tenderness to palpation or major deformities noted.   Neurologic: Alert & oriented x 3, Normal motor function, Normal sensory function, No focal deficits noted.   Psychiatric: Affect normal, Judgment normal, Mood normal.       RADIOLOGY/PROCEDURES  CT-HEAD W/O   Final Result      No acute intracranial findings.               COURSE & MEDICAL DECISION MAKING  Pertinent Labs & Imaging studies reviewed. (See chart for details)  Blood tests and CT scan was unremarkable patient had no meningeal signs. Potassium slightly low she was given replacement here in the emergency room she has history of anxiety disorder placed on Valium for home and will follow up with her primary care physician if not was given referral to Dr. Gonzalez she verbalizes understanding instructions and need for follow-up no driving or operating heavy machinery of medication. Continue her antibiotic until finished. Return if persistent or worsening symptoms    FINAL IMPRESSION  1. Anxiety  2. History " recent sinus infection, currently on medication  3.         Electronically signed by: Jay Medley, 1/16/2017 1:49 AM

## 2017-03-17 ENCOUNTER — HOSPITAL ENCOUNTER (EMERGENCY)
Facility: MEDICAL CENTER | Age: 32
End: 2017-03-17
Payer: MEDICAID

## 2017-03-17 VITALS
RESPIRATION RATE: 16 BRPM | SYSTOLIC BLOOD PRESSURE: 119 MMHG | DIASTOLIC BLOOD PRESSURE: 73 MMHG | HEIGHT: 64 IN | TEMPERATURE: 98.4 F | HEART RATE: 86 BPM | BODY MASS INDEX: 26.35 KG/M2 | WEIGHT: 154.32 LBS | OXYGEN SATURATION: 97 %

## 2017-03-17 LAB
ALBUMIN SERPL BCP-MCNC: 4.2 G/DL (ref 3.2–4.9)
ALBUMIN/GLOB SERPL: 1.1 G/DL
ALP SERPL-CCNC: 69 U/L (ref 30–99)
ALT SERPL-CCNC: 11 U/L (ref 2–50)
ANION GAP SERPL CALC-SCNC: 12 MMOL/L (ref 0–11.9)
AST SERPL-CCNC: 16 U/L (ref 12–45)
BASOPHILS # BLD AUTO: 0.3 % (ref 0–1.8)
BASOPHILS # BLD: 0.02 K/UL (ref 0–0.12)
BILIRUB SERPL-MCNC: 0.7 MG/DL (ref 0.1–1.5)
BUN SERPL-MCNC: 9 MG/DL (ref 8–22)
CALCIUM SERPL-MCNC: 9.1 MG/DL (ref 8.5–10.5)
CHLORIDE SERPL-SCNC: 105 MMOL/L (ref 96–112)
CO2 SERPL-SCNC: 22 MMOL/L (ref 20–33)
CREAT SERPL-MCNC: 0.54 MG/DL (ref 0.5–1.4)
EOSINOPHIL # BLD AUTO: 0.13 K/UL (ref 0–0.51)
EOSINOPHIL NFR BLD: 1.7 % (ref 0–6.9)
ERYTHROCYTE [DISTWIDTH] IN BLOOD BY AUTOMATED COUNT: 42.5 FL (ref 35.9–50)
GFR SERPL CREATININE-BSD FRML MDRD: >60 ML/MIN/1.73 M 2
GLOBULIN SER CALC-MCNC: 4 G/DL (ref 1.9–3.5)
GLUCOSE SERPL-MCNC: 86 MG/DL (ref 65–99)
HCT VFR BLD AUTO: 41.8 % (ref 37–47)
HGB BLD-MCNC: 13.8 G/DL (ref 12–16)
IMM GRANULOCYTES # BLD AUTO: 0.02 K/UL (ref 0–0.11)
IMM GRANULOCYTES NFR BLD AUTO: 0.3 % (ref 0–0.9)
LIPASE SERPL-CCNC: 13 U/L (ref 11–82)
LYMPHOCYTES # BLD AUTO: 2.26 K/UL (ref 1–4.8)
LYMPHOCYTES NFR BLD: 29.9 % (ref 22–41)
MCH RBC QN AUTO: 28.7 PG (ref 27–33)
MCHC RBC AUTO-ENTMCNC: 33 G/DL (ref 33.6–35)
MCV RBC AUTO: 86.9 FL (ref 81.4–97.8)
MONOCYTES # BLD AUTO: 0.57 K/UL (ref 0–0.85)
MONOCYTES NFR BLD AUTO: 7.5 % (ref 0–13.4)
NEUTROPHILS # BLD AUTO: 4.55 K/UL (ref 2–7.15)
NEUTROPHILS NFR BLD: 60.3 % (ref 44–72)
NRBC # BLD AUTO: 0 K/UL
NRBC BLD AUTO-RTO: 0 /100 WBC
PLATELET # BLD AUTO: 241 K/UL (ref 164–446)
PMV BLD AUTO: 9.3 FL (ref 9–12.9)
POTASSIUM SERPL-SCNC: 3.3 MMOL/L (ref 3.6–5.5)
PROT SERPL-MCNC: 8.2 G/DL (ref 6–8.2)
RBC # BLD AUTO: 4.81 M/UL (ref 4.2–5.4)
SODIUM SERPL-SCNC: 139 MMOL/L (ref 135–145)
WBC # BLD AUTO: 7.6 K/UL (ref 4.8–10.8)

## 2017-03-17 PROCEDURE — 83690 ASSAY OF LIPASE: CPT

## 2017-03-17 PROCEDURE — 80053 COMPREHEN METABOLIC PANEL: CPT

## 2017-03-17 PROCEDURE — 85025 COMPLETE CBC W/AUTO DIFF WBC: CPT

## 2017-03-17 PROCEDURE — 36415 COLL VENOUS BLD VENIPUNCTURE: CPT

## 2017-03-17 PROCEDURE — 302449 STATCHG TRIAGE ONLY (STATISTIC)

## 2017-03-17 ASSESSMENT — PAIN SCALES - GENERAL: PAINLEVEL_OUTOF10: 8

## 2017-03-18 ENCOUNTER — HOSPITAL ENCOUNTER (EMERGENCY)
Facility: MEDICAL CENTER | Age: 32
End: 2017-03-18
Attending: EMERGENCY MEDICINE
Payer: MEDICAID

## 2017-03-18 VITALS
DIASTOLIC BLOOD PRESSURE: 64 MMHG | SYSTOLIC BLOOD PRESSURE: 95 MMHG | HEART RATE: 90 BPM | TEMPERATURE: 98.4 F | OXYGEN SATURATION: 97 % | HEIGHT: 64 IN | BODY MASS INDEX: 26.29 KG/M2 | WEIGHT: 154 LBS | RESPIRATION RATE: 16 BRPM

## 2017-03-18 DIAGNOSIS — R19.7 DIARRHEA, UNSPECIFIED TYPE: ICD-10-CM

## 2017-03-18 DIAGNOSIS — K92.1 BLOOD IN STOOL: ICD-10-CM

## 2017-03-18 LAB
BASOPHILS # BLD AUTO: 0.3 % (ref 0–1.8)
BASOPHILS # BLD: 0.02 K/UL (ref 0–0.12)
EOSINOPHIL # BLD AUTO: 0.16 K/UL (ref 0–0.51)
EOSINOPHIL NFR BLD: 2.6 % (ref 0–6.9)
ERYTHROCYTE [DISTWIDTH] IN BLOOD BY AUTOMATED COUNT: 43.3 FL (ref 35.9–50)
HCT VFR BLD AUTO: 41.4 % (ref 37–47)
HGB BLD-MCNC: 13.6 G/DL (ref 12–16)
IMM GRANULOCYTES # BLD AUTO: 0.02 K/UL (ref 0–0.11)
IMM GRANULOCYTES NFR BLD AUTO: 0.3 % (ref 0–0.9)
INR PPP: 0.93 (ref 0.87–1.13)
LYMPHOCYTES # BLD AUTO: 1.38 K/UL (ref 1–4.8)
LYMPHOCYTES NFR BLD: 22.4 % (ref 22–41)
MCH RBC QN AUTO: 28.8 PG (ref 27–33)
MCHC RBC AUTO-ENTMCNC: 32.9 G/DL (ref 33.6–35)
MCV RBC AUTO: 87.7 FL (ref 81.4–97.8)
MONOCYTES # BLD AUTO: 0.53 K/UL (ref 0–0.85)
MONOCYTES NFR BLD AUTO: 8.6 % (ref 0–13.4)
NEUTROPHILS # BLD AUTO: 4.04 K/UL (ref 2–7.15)
NEUTROPHILS NFR BLD: 65.8 % (ref 44–72)
NRBC # BLD AUTO: 0 K/UL
NRBC BLD AUTO-RTO: 0 /100 WBC
PLATELET # BLD AUTO: 224 K/UL (ref 164–446)
PMV BLD AUTO: 9.3 FL (ref 9–12.9)
PROTHROMBIN TIME: 12.7 SEC (ref 12–14.6)
RBC # BLD AUTO: 4.72 M/UL (ref 4.2–5.4)
WBC # BLD AUTO: 6.2 K/UL (ref 4.8–10.8)

## 2017-03-18 PROCEDURE — 82272 OCCULT BLD FECES 1-3 TESTS: CPT

## 2017-03-18 PROCEDURE — 85610 PROTHROMBIN TIME: CPT

## 2017-03-18 PROCEDURE — 85025 COMPLETE CBC W/AUTO DIFF WBC: CPT

## 2017-03-18 PROCEDURE — 99284 EMERGENCY DEPT VISIT MOD MDM: CPT

## 2017-03-18 RX ORDER — OMEPRAZOLE 20 MG/1
20 CAPSULE, DELAYED RELEASE ORAL DAILY
Qty: 30 CAP | Refills: 0 | Status: ON HOLD | OUTPATIENT
Start: 2017-03-18 | End: 2019-08-24

## 2017-03-18 RX ORDER — IBUPROFEN 400 MG/1
400 TABLET ORAL EVERY 6 HOURS PRN
Status: ON HOLD | COMMUNITY
End: 2019-05-27

## 2017-03-18 ASSESSMENT — PAIN SCALES - GENERAL
PAINLEVEL_OUTOF10: 6
PAINLEVEL_OUTOF10: 6

## 2017-03-18 NOTE — LETTER
"  FORM C-4:  EMPLOYEE’S CLAIM FOR COMPENSATION/ REPORT OF INITIAL TREATMENT  EMPLOYEE’S CLAIM - PROVIDE ALL INFORMATION REQUESTED   First Name  Angelina Last Name  Dior Birthdate             Age  1985 31 y.o. Sex  female Claim Number   Home Employee Address  [address]                                     Zip  [address] Height  1.626 m (5' 4\") Weight  69.854 kg (154 lb) 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 (if applicable)  lifting 2-12 pack case of soda    How did this injury or occupational disease occur? Be specific and answer in detail. Use additional sheet if necessary)  I was lifting cans   If you believe that you have an occupational disease, when did you first have knowledge of the disability and it relationship to your employment?  \ Witnesses to the Accident  none     Nature of Injury or Occupational Disease  Strain  Part(s) of Body Injured or Affected  Abdomen Including Groin, N/A, N/A    I certify that the above is true and correct to the best of my knowledge and that I have provided this information in order to obtain the benefits of [place]  I hereby authorize any physician, chiropractor, surgeon, practitioner, or other person, any hospital, including [facility] or [facility] hospital, any medical service organization, any insurance company, or other institution or organization to release to each other, any medical or other information, including benefits paid or payable, pertinent to this injury or disease, except information relative to diagnosis, treatment and/or counseling for AIDS, psychological conditions, alcohol or controlled substances, for which I must give specific authorization.  A Photostat of this authorization shall be as valid as the original.   Date Place   Employee’s Signature   THIS REPORT MUST BE COMPLETED AND MAILED WITHIN 3 WORKING DAYS OF TREATMENT   Place  [facility], EMERGENCY DEPT  Name of Facility   [facility]   Date  3/18/2017 Diagnosis  (K92.1) Blood in stool  (R19.7) Diarrhea, unspecified type Is there evidence the injured employee was under the influence of alcohol and/or another controlled substance at the time of accident?   Hour  1:41 PM Description of Injury or Disease  Blood in stool  Diarrhea, unspecified type No   Treatment  Labs, exam  Have you advised the patient to remain off work five days or more?         No   X-Ray Findings      If Yes   From Date    To Date      From information given by the employee, together with medical evidence, can you directly connect this injury or occupational disease as job incurred?    Comments:unclear If No, is the employee capable of: Full Duty  Yes Modified Duty      Is additional medical care by a physician indicated?  Yes If Modified Duty, Specify any Limitations / Restrictions        Do you know of any previous injury or disease contributing to this condition or occupational disease?  No   Date  3/18/2017 Print Doctor’s Name  Mimi Castaneda I certify the  "employer’s copy of this form was mailed on:   Address  [address]  [place] [address] [phone] Insurer’s Use Only   [facility]  [address]    Provider’s Tax ID Number  [id number] Telephone  Dept: [phone]    Doctor’s Signature  ciara-[email or web address] Reinoso M.D. Degree  M.JUAN.    Original - TREATING PHYSICIAN OR CHIROPRACTOR   Pg 2-Insurer/TPA   Pg 3-Employer   Pg 4-Employee                                                                                                  Form C-4 (rev01/03)     BRIEF DESCRIPTION OF RIGHTS AND BENEFITS  (Pursuant to [place] 616[place])    Notice of Injury or Occupational Disease (Incident Report Form C-1): If an injury or occupational disease (OD) arises out of and in the course of employment, you must provide written notice to your employer as soon as practicable, but no later than 7 days after the accident or OD. Your employer shall maintain a sufficient supply of the required forms.    Claim for Compensation (Form C-4): If medical treatment is sought, the form C-4 is available at the place of initial treatment. A completed \"Claim for Compensation\" (Form C-4) must be filed within 90 days after an accident or OD. The treating physician or chiropractor must, within 3 working days after treatment, complete and mail to the employer, the employer's insurer and third-party , the Claim for Compensation.    Medical Treatment: If you require medical treatment for your on-the-job injury or OD, you may be required to select a physician or chiropractor from a list provided by your workers’ compensation insurer, if it has contracted with an Organization for Managed Care (MCO) or Preferred Provider Organization (PPO) or providers of health care. If your employer has not entered into a contract with an MCO or PPO, you may select a physician or chiropractor from the Panel of Physicians and Chiropractors. Any medical costs related to your industrial injury or OD will " be paid by your insurer.    Temporary Total Disability (TTD): If your doctor has certified that you are unable to work for a period of at least 5 consecutive days, or 5 cumulative days in a 20-day period, or places restrictions on you that your employer does not accommodate, you may be entitled to TTD compensation.    Temporary Partial Disability (TPD): If the wage you receive upon reemployment is less than the compensation for TTD to which you are entitled, the insurer may be required to pay you TPD compensation to make up the difference. TPD can only be paid for a maximum of 24 months.    Permanent Partial Disability (PPD): When your medical condition is stable and there is an indication of a PPD as a result of your injury or OD, within 30 days, your insurer must arrange for an evaluation by a rating physician or chiropractor to determine the degree of your PPD. The amount of your PPD award depends on the date of injury, the results of the PPD evaluation and your age and wage.    Permanent Total Disability (PTD): If you are medically certified by a treating physician or chiropractor as permanently and totally disabled and have been granted a PTD status by your insurer, you are entitled to receive monthly benefits not to exceed 66 2/3% of your average monthly wage. The amount of your PTD payments is subject to reduction if you previously received a PPD award.    Vocational Rehabilitation Services: You may be eligible for vocational rehabilitation services if you are unable to return to the job due to a permanent physical impairment or permanent restrictions as a result of your injury or occupational disease.    Transportation and Per Cuco Reimbursement: You may be eligible for travel expenses and per cuco associated with medical treatment.  Reopening: You may be able to reopen your claim if your condition worsens after claim closure.    Appeal Process: If you disagree with a written determination issued by the  insurer or the insurer does not respond to your request, you may appeal to the Department of Administration, , by following the instructions contained in your determination letter. You must appeal the determination within 70 days from the date of the determination letter at [address], [address], [place], [place] [address], or [address], [address], [place], [place] [address]. If you disagree with the  decision, you may appeal to the Department of Administration, . You must file your appeal within 30 days from the date of the  decision letter at [address], [address], [place], [place] [address], or [address], [place], [place] [address]. If you disagree with a decision of an , you may file a petition for judicial review with the District Court. You must do so within 30 days of the Appeal Officer’s decision. You may be represented by an  at your own expense or you may contact the [facility] for possible representation.    [place]  for Injured Workers (NAIW): If you disagree with a  decision, you may request that NAIW represent you without charge at an  Hearing. For information regarding denial of benefits, you may contact the [facility] at: [address], [address], [place] [address], [phone], or [address], [place] [address], [phone]    To File a Complaint with the Division: If you wish to file a complaint with the  of the Division of Industrial Relations (DIR), please contact the Workers’ Compensation Section, [address], [place], [place] [address], telephone [phone]) [phone], or [address], [place] [address], telephone [phone].    For assistance with Workers’ Compensation Issues: you may contact the Office of the Governor Consumer Health  Assistance, [address], [address], [place], [place] [address], Toll Free 1-576.215.8800, Web site: http://[email or web address]., E-mail [email or web address].[email or web address].                                                                                                                                                                               __________________________________________________________________                                    _________________            Employee Name / Signature                                                                                                                            Date                                       D-2 (rev. 10/07)

## 2017-03-18 NOTE — ED AVS SNAPSHOT
3/18/2017          Angelina Rader  [address]  [place] [address]    Dear Angelina:    [facility] wants to ensure your discharge home is safe and you or your loved ones have had all your questions answered regarding your care after you leave the hospital.    You may receive a telephone call within two days of your discharge.  This call is to make certain you understand your discharge instructions as well as ensure we provided you with the best care possible during your stay with us.     The call will only last approximately 3-5 minutes and will be done by a nurse.    Once again, we want to ensure your discharge home is safe and that you have a clear understanding of any next steps in your care.  If you have any questions or concerns, please do not hesitate to contact us, we are here for you.  Thank you for choosing [facility] for your healthcare needs.    Sincerely,    Reno Massey    [facility]

## 2017-03-18 NOTE — DISCHARGE INSTRUCTIONS
Stop ibuprofen  Take prilosec for possible acid issue  Follow up with your doctor at [facility] on Monday  Follow up with Gastroenterology --you may need endoscopy to look at your stomach and intestines for bleeding    THE EXACT CAUSE OF YOUR PAIN IS UNCLEAR--THIS MIGHT BE EARLY IN THE DISEASE PROCESS AND WE ARE UNABLE TO IDENTIFY IT AT THIS TIME (SUCH AS EARLY APPENDICITIS, FOR EXAMPLE).      RETURN TO ER IN 8-12 HRS UNLESS YOU ARE FEELING COMPLETELY BETTER.    RETURN SOONER FOR PAIN, VOMITING NOT CONTROLLED BY MEDICATIONS, FEVER, ANY OTHER CONCERN.    CLEAR LIQUIDS FOR NEXT 12 HOURS.  ADVANCE DIET AS TOLERATED.        Bloody Diarrhea  Bloody diarrhea can be caused by many different conditions. Most of the time bloody diarrhea is the result of food poisoning or minor infections. Bloody diarrhea usually improves over 2 to 3 days of rest and fluid replacement. Other conditions that can cause bloody diarrhea include:  · Internal bleeding.  · Infection.  · Diseases of the bowel and colon.  Internal bleeding from an ulcer or bowel disease can be severe and requires hospital care or even surgery.  DIAGNOSIS   To find out what is wrong your caregiver may check your:  · Stool.  · Blood.  · Results from a test that looks inside the body (endoscopy).  TREATMENT   · Get plenty of rest.  · Drink enough water and fluids to keep your urine clear or pale yellow.  · Do not smoke.  · Solid foods and dairy products should be avoided until your illness improves.  · As you improve, slowly return to a regular diet with easily-digested foods first. Examples are:  · Bananas.  · Rice.  · Toast.  · Crackers.  You should only need these for about 2 days before adding more normal foods to your diet.  · Avoid spicy or fatty foods as well as caffeine and alcohol for several days.  · Medicine to control cramping and diarrhea can relieve symptoms but may prolong some cases of bloody diarrhea. Antibiotics can speed recovery from diarrhea due  "to some bacterial infections. Call your caregiver if diarrhea does not get better in 3 days.  SEEK MEDICAL CARE IF:   · You do not improve after 3 days.  · Your diarrhea improves but your stool appears black.  SEEK IMMEDIATE MEDICAL CARE IF:   · You become extremely weak or faint.  · You become very sweaty.  · You have increased pain or bleeding.  · You develop repeated vomiting.  · You vomit and you see blood or the vomit looks black in color.  · You have a fever.     This information is not intended to replace advice given to you by your health care provider. Make sure you discuss any questions you have with your health care provider.     Document Released: 12/18/2006 Document Revised: 01/08/2016 Document Reviewed: 11/19/2010  [employer] Interactive Patient Education ©2016 [employer]        Bloody Stools  Bloody stools often mean that there is a problem in the digestive tract. Your caregiver may use the term \"melena\" to describe black, tarry, and bad smelling stools or \"hematochezia\" to describe red or maroon-colored stools. Blood seen in the stool can be caused by bleeding anywhere along the intestinal tract.   A black stool usually means that blood is coming from the upper part of the gastrointestinal tract (esophagus, stomach, or small bowel). Passing maroon-colored stools or bright red blood usually means that blood is coming from lower down in the large bowel or the rectum. However, sometimes massive bleeding in the stomach or small intestine can cause bright red bloody stools.   Consuming black licorice, lead, iron pills, medicines containing bismuth subsalicylate, or blueberries can also cause black stools. Your caregiver can test black stools to see if blood is present.  It is important that the cause of the bleeding be found. Treatment can then be started, and the problem can be corrected. Rectal bleeding may not be serious, but you should not assume everything is okay until you know the cause. It is " very important to follow up with your caregiver or a specialist in gastrointestinal problems.  CAUSES   Blood in the stools can come from various underlying causes. Often, the cause is not found during your first visit. Testing is often needed to discover the cause of bleeding in the gastrointestinal tract. Causes range from simple to serious or even life-threatening. Possible causes include:  · Hemorrhoids. These are veins that are full of blood (engorged) in the rectum. They cause pain, inflammation, and may bleed.  · Anal fissures. These are areas of painful tearing which may bleed. They are often caused by passing hard stool.  · Diverticulosis. These are pouches that form on the colon over time, with age, and may bleed significantly.  · Diverticulitis. This is inflammation in areas with diverticulosis. It can cause pain, fever, and bloody stools, although bleeding is rare.  · Proctitis and colitis. These are inflamed areas of the rectum or colon. They may cause pain, fever, and bloody stools.  · Polyps and cancer. Colon cancer is a leading cause of preventable cancer death. It often starts out as precancerous polyps that can be removed during a colonoscopy, preventing progression into cancer. Sometimes, polyps and cancer may cause rectal bleeding.  · Gastritis and ulcers. Bleeding from the upper gastrointestinal tract (near the stomach) may travel through the intestines and produce black, sometimes tarry, often bad smelling stools. In certain cases, if the bleeding is fast enough, the stools may not be black, but red and the condition may be life-threatening.  SYMPTOMS   You may have stools that are bright red and bloody, that are normal color with blood on them, or that are dark black and tarry. In some cases, you may only have blood in the toilet bowl. Any of these cases need medical care. You may also have:  · Pain at the anus or anywhere in the rectum.  · Lightheadedness or feeling faint.  · Extreme  weakness.  · Nausea or vomiting.  · Fever.  DIAGNOSIS  Your caregiver may use the following methods to find the cause of your bleeding:  · Taking a medical history. Age is important. Older people tend to develop polyps and cancer more often. If there is anal pain and a hard, large stool associated with bleeding, a tear of the anus may be the cause. If blood drips into the toilet after a bowel movement, bleeding hemorrhoids may be the problem. The color and frequency of the bleeding are additional considerations. In most cases, the medical history provides clues, but seldom the final answer.  · A visual and finger (digital) exam. Your caregiver will inspect the anal area, looking for tears and hemorrhoids. A finger exam can provide information when there is tenderness or a growth inside. In men, the prostate is also examined.  · Endoscopy. Several types of small, long scopes (endoscopes) are used to view the colon.  · In the office, your caregiver may use a rigid, or more commonly, a flexible viewing sigmoidoscope. This exam is called flexible sigmoidoscopy. It is performed in 5 to 10 minutes.  · A more thorough exam is accomplished with a colonoscope. It allows your caregiver to view the entire 5 to 6 foot long colon. Medicine to help you relax (sedative) is usually given for this exam. Frequently, a bleeding lesion may be present beyond the reach of the sigmoidoscope. So, a colonoscopy may be the best exam to start with. Both exams are usually done on an outpatient basis. This means the patient does not stay overnight in the hospital or surgery center.  · An upper endoscopy may be needed to examine your stomach. Sedation is used and a flexible endoscope is put in your mouth, down to your stomach.  · A barium enema X-ray. This is an X-ray exam. It uses liquid barium inserted by enema into the rectum. This test alone may not identify an actual bleeding point. X-rays highlight abnormal shadows, such as those made by  lumps (tumors), diverticuli, or colitis.  TREATMENT   Treatment depends on the cause of your bleeding.   · For bleeding from the stomach or colon, the caregiver doing your endoscopy or colonoscopy may be able to stop the bleeding as part of the procedure.  · Inflammation or infection of the colon can be treated with medicines.  · Many rectal problems can be treated with creams, suppositories, or warm baths.  · Surgery is sometimes needed.  · Blood transfusions are sometimes needed if you have lost a lot of blood.  · For any bleeding problem, let your caregiver know if you take aspirin or other blood thinners regularly.  HOME CARE INSTRUCTIONS   · Take any medicines exactly as prescribed.  · Keep your stools soft by eating a diet high in fiber. Prunes (1 to 3 a day) work well for many people.  · Drink enough water and fluids to keep your urine clear or pale yellow.  · Take sitz baths if advised. A sitz bath is when you sit in a bathtub with warm water for 10 to 15 minutes to soak, soothe, and cleanse the rectal area.  · If enemas or suppositories are advised, be sure you know how to use them. Tell your caregiver if you have problems with this.  · Monitor your bowel movements to look for signs of improvement or worsening.  SEEK MEDICAL CARE IF:   · You do not improve in the time expected.  · Your condition worsens after initial improvement.  · You develop any new symptoms.  SEEK IMMEDIATE MEDICAL CARE IF:   · You develop severe or prolonged rectal bleeding.  · You vomit blood.  · You feel weak or faint.  · You have a fever.  MAKE SURE YOU:  · Understand these instructions.  · Will watch your condition.  · Will get help right away if you are not doing well or get worse.  Document Released: 12/08/2003 Document Revised: 03/11/2013 Document Reviewed: 05/04/2012  [employer]® Patient Information ©2014 [employer].

## 2017-03-18 NOTE — ED AVS SNAPSHOT
Home Care Instructions                                                                                                                Angelina Rader   MRN: [id number]    Department:  [facility], Emergency Dept   Date of Visit:  3/18/2017            [facility], Emergency Dept    [address] [address]    Phone:  [phone]      You were seen by     Mimi Castaneda M.D.      Your Diagnosis Was     Blood in stool     K92.1       Follow-up Information     1. Follow up with [facility], Emergency Dept.    Specialty:  Emergency Medicine    Why:  As needed, If symptoms worsen    Contact information    [address] [facility] [address] [phone]        2. Follow up with [facility]. Schedule an appointment as soon as possible for a visit in 2 days.    Contact information    [address] [address] [phone]        3. Follow up with [facility]. Schedule an appointment as soon as possible for a visit in 2 days.    Contact information    [address]  [facility] [address] [phone]          4. Follow up with [facility]. Call in 2 days.    Why:  for appointment    Contact information    [address]  [facility] [address] [phone]        5. Follow up with [facility]. Schedule an appointment as soon as possible for a visit in 2 days.    Contact information    [address]  [facility] [id number] [phone]        Medication Information     Review all of your home medications and newly ordered medications with your primary doctor and/or pharmacist as soon as possible. Follow medication instructions as directed by your doctor and/or pharmacist.     Please keep your complete medication list with you and share with your physician. Update the information when medications are discontinued, doses are changed, or new medications (including over-the-counter products)  are added; and carry medication information at all times in the event of emergency situations.               Medication List      START taking these medications        Instructions    Morning Afternoon Evening Bedtime    omeprazole 20 MG delayed-release capsule   Commonly known as:  PRILOSEC        Take 1 Cap by mouth every day.   Dose:  20 mg                          ASK your doctor about these medications        Instructions    Morning Afternoon Evening Bedtime    ibuprofen 400 MG Tabs   Commonly known as:  MOTRIN        Take 400 mg by mouth every 6 hours as needed.   Dose:  400 mg                             Where to Get Your Medications      You can get these medications from any pharmacy     Bring a paper prescription for each of these medications    - omeprazole 20 MG delayed-release capsule            Procedures and tests performed during your visit     CBC WITH DIFFERENTIAL    PROTHROMBIN TIME    SALINE LOCK        Discharge Instructions       Stop ibuprofen  Take prilosec for possible acid issue  Follow up with your doctor at [facility] on Monday  Follow up with Gastroenterology --you may need endoscopy to look at your stomach and intestines for bleeding    THE EXACT CAUSE OF YOUR PAIN IS UNCLEAR--THIS MIGHT BE EARLY IN THE DISEASE PROCESS AND WE ARE UNABLE TO IDENTIFY IT AT THIS TIME (SUCH AS EARLY APPENDICITIS, FOR EXAMPLE).      RETURN TO ER IN 8-12 HRS UNLESS YOU ARE FEELING COMPLETELY BETTER.    RETURN SOONER FOR PAIN, VOMITING NOT CONTROLLED BY MEDICATIONS, FEVER, ANY OTHER CONCERN.    CLEAR LIQUIDS FOR NEXT 12 HOURS.  ADVANCE DIET AS TOLERATED.        Bloody Diarrhea  Bloody diarrhea can be caused by many different conditions. Most of the time bloody diarrhea is the result of food poisoning or minor infections. Bloody diarrhea usually improves over 2 to 3 days of rest and fluid replacement. Other conditions that can cause bloody diarrhea include:  · Internal bleeding.  · Infection.  · Diseases of  "the bowel and colon.  Internal bleeding from an ulcer or bowel disease can be severe and requires hospital care or even surgery.  DIAGNOSIS   To find out what is wrong your caregiver may check your:  · Stool.  · Blood.  · Results from a test that looks inside the body (endoscopy).  TREATMENT   · Get plenty of rest.  · Drink enough water and fluids to keep your urine clear or pale yellow.  · Do not smoke.  · Solid foods and dairy products should be avoided until your illness improves.  · As you improve, slowly return to a regular diet with easily-digested foods first. Examples are:  · Bananas.  · Rice.  · Toast.  · Crackers.  You should only need these for about 2 days before adding more normal foods to your diet.  · Avoid spicy or fatty foods as well as caffeine and alcohol for several days.  · Medicine to control cramping and diarrhea can relieve symptoms but may prolong some cases of bloody diarrhea. Antibiotics can speed recovery from diarrhea due to some bacterial infections. Call your caregiver if diarrhea does not get better in 3 days.  SEEK MEDICAL CARE IF:   · You do not improve after 3 days.  · Your diarrhea improves but your stool appears black.  SEEK IMMEDIATE MEDICAL CARE IF:   · You become extremely weak or faint.  · You become very sweaty.  · You have increased pain or bleeding.  · You develop repeated vomiting.  · You vomit and you see blood or the vomit looks black in color.  · You have a fever.     This information is not intended to replace advice given to you by your health care provider. Make sure you discuss any questions you have with your health care provider.     Document Released: 12/18/2006 Document Revised: 01/08/2016 Document Reviewed: 11/19/2010  [employer] Patient Education ©2016 [employer]        Bloody Stools  Bloody stools often mean that there is a problem in the digestive tract. Your caregiver may use the term \"melena\" to describe black, tarry, and bad smelling stools " "or \"hematochezia\" to describe red or maroon-colored stools. Blood seen in the stool can be caused by bleeding anywhere along the intestinal tract.   A black stool usually means that blood is coming from the upper part of the gastrointestinal tract (esophagus, stomach, or small bowel). Passing maroon-colored stools or bright red blood usually means that blood is coming from lower down in the large bowel or the rectum. However, sometimes massive bleeding in the stomach or small intestine can cause bright red bloody stools.   Consuming black licorice, lead, iron pills, medicines containing bismuth subsalicylate, or blueberries can also cause black stools. Your caregiver can test black stools to see if blood is present.  It is important that the cause of the bleeding be found. Treatment can then be started, and the problem can be corrected. Rectal bleeding may not be serious, but you should not assume everything is okay until you know the cause. It is very important to follow up with your caregiver or a specialist in gastrointestinal problems.  CAUSES   Blood in the stools can come from various underlying causes. Often, the cause is not found during your first visit. Testing is often needed to discover the cause of bleeding in the gastrointestinal tract. Causes range from simple to serious or even life-threatening. Possible causes include:  · Hemorrhoids. These are veins that are full of blood (engorged) in the rectum. They cause pain, inflammation, and may bleed.  · Anal fissures. These are areas of painful tearing which may bleed. They are often caused by passing hard stool.  · Diverticulosis. These are pouches that form on the colon over time, with age, and may bleed significantly.  · Diverticulitis. This is inflammation in areas with diverticulosis. It can cause pain, fever, and bloody stools, although bleeding is rare.  · Proctitis and colitis. These are inflamed areas of the rectum or colon. They may cause pain, " fever, and bloody stools.  · Polyps and cancer. Colon cancer is a leading cause of preventable cancer death. It often starts out as precancerous polyps that can be removed during a colonoscopy, preventing progression into cancer. Sometimes, polyps and cancer may cause rectal bleeding.  · Gastritis and ulcers. Bleeding from the upper gastrointestinal tract (near the stomach) may travel through the intestines and produce black, sometimes tarry, often bad smelling stools. In certain cases, if the bleeding is fast enough, the stools may not be black, but red and the condition may be life-threatening.  SYMPTOMS   You may have stools that are bright red and bloody, that are normal color with blood on them, or that are dark black and tarry. In some cases, you may only have blood in the toilet bowl. Any of these cases need medical care. You may also have:  · Pain at the anus or anywhere in the rectum.  · Lightheadedness or feeling faint.  · Extreme weakness.  · Nausea or vomiting.  · Fever.  DIAGNOSIS  Your caregiver may use the following methods to find the cause of your bleeding:  · Taking a medical history. Age is important. Older people tend to develop polyps and cancer more often. If there is anal pain and a hard, large stool associated with bleeding, a tear of the anus may be the cause. If blood drips into the toilet after a bowel movement, bleeding hemorrhoids may be the problem. The color and frequency of the bleeding are additional considerations. In most cases, the medical history provides clues, but seldom the final answer.  · A visual and finger (digital) exam. Your caregiver will inspect the anal area, looking for tears and hemorrhoids. A finger exam can provide information when there is tenderness or a growth inside. In men, the prostate is also examined.  · Endoscopy. Several types of small, long scopes (endoscopes) are used to view the colon.  · In the office, your caregiver may use a rigid, or more  commonly, a flexible viewing sigmoidoscope. This exam is called flexible sigmoidoscopy. It is performed in 5 to 10 minutes.  · A more thorough exam is accomplished with a colonoscope. It allows your caregiver to view the entire 5 to 6 foot long colon. Medicine to help you relax (sedative) is usually given for this exam. Frequently, a bleeding lesion may be present beyond the reach of the sigmoidoscope. So, a colonoscopy may be the best exam to start with. Both exams are usually done on an outpatient basis. This means the patient does not stay overnight in the hospital or surgery center.  · An upper endoscopy may be needed to examine your stomach. Sedation is used and a flexible endoscope is put in your mouth, down to your stomach.  · A barium enema X-ray. This is an X-ray exam. It uses liquid barium inserted by enema into the rectum. This test alone may not identify an actual bleeding point. X-rays highlight abnormal shadows, such as those made by lumps (tumors), diverticuli, or colitis.  TREATMENT   Treatment depends on the cause of your bleeding.   · For bleeding from the stomach or colon, the caregiver doing your endoscopy or colonoscopy may be able to stop the bleeding as part of the procedure.  · Inflammation or infection of the colon can be treated with medicines.  · Many rectal problems can be treated with creams, suppositories, or warm baths.  · Surgery is sometimes needed.  · Blood transfusions are sometimes needed if you have lost a lot of blood.  · For any bleeding problem, let your caregiver know if you take aspirin or other blood thinners regularly.  HOME CARE INSTRUCTIONS   · Take any medicines exactly as prescribed.  · Keep your stools soft by eating a diet high in fiber. Prunes (1 to 3 a day) work well for many people.  · Drink enough water and fluids to keep your urine clear or pale yellow.  · Take sitz baths if advised. A sitz bath is when you sit in a bathtub with warm water for 10 to 15 minutes  to soak, soothe, and cleanse the rectal area.  · If enemas or suppositories are advised, be sure you know how to use them. Tell your caregiver if you have problems with this.  · Monitor your bowel movements to look for signs of improvement or worsening.  SEEK MEDICAL CARE IF:   · You do not improve in the time expected.  · Your condition worsens after initial improvement.  · You develop any new symptoms.  SEEK IMMEDIATE MEDICAL CARE IF:   · You develop severe or prolonged rectal bleeding.  · You vomit blood.  · You feel weak or faint.  · You have a fever.  MAKE SURE YOU:  · Understand these instructions.  · Will watch your condition.  · Will get help right away if you are not doing well or get worse.  Document Released: 12/08/2003 Document Revised: 03/11/2013 Document Reviewed: 05/04/2012  [employer]® Patient Information ©2014 [employer].            Patient Information     Patient Information    Following emergency treatment: all patient requiring follow-up care must return either to a private physician or a clinic if your condition worsens before you are able to obtain further medical attention, please return to the emergency room.     Billing Information    At [facility], we work to make the billing process streamlined for our patients.  Our Representatives are here to answer any questions you may have regarding your hospital bill.  If you have insurance coverage and have supplied your insurance information to us, we will submit a claim to your insurer on your behalf.  Should you have any questions regarding your bill, we can be reached online or by phone as follows:  Online: You are able pay your bills online or live chat with our representatives about any billing questions you may have. We are here to help Monday - Friday from 8:00am to 7:30pm and 9:00am - 12:00pm on Saturdays.  Please visit [email or web address]  for more information.   Phone:  [phone] or  [phone]    Please note that your emergency physician, surgeon, pathologist, radiologist, anesthesiologist, and other specialists are not employed by [facility] and will therefore bill separately for their services.  Please contact them directly for any questions concerning their bills at the numbers below:     Emergency Physician Services:  [phone]  [facility]:  [phone]  [facility]:  [phone]  [facility]:  [phone]    1. Your final bill may vary from the amount quoted upon discharge if all procedures are not complete at that time, or if your doctor has additional procedures of which we are not aware. You will receive an additional bill if you return to the Emergency Department at [facility] for suture removal regardless of the facility of which the sutures were placed.     2. Please arrange for settlement of this account at the emergency registration.    3. All self-pay accounts are due in full at the time of treatment.  If you are unable to meet this obligation then payment is expected within 4-5 days.     4. If you have had radiology studies (CT, X-ray, Ultrasound, MRI), you have received a preliminary result during your emergency department visit. Please contact the radiology department [phone] to receive a copy of your final result. Please discuss the Final result with your primary physician or with the follow up physician provided.     Crisis Hotline:  [place] Crisis Hotline:  3-236-AKBTUUK or 1-874.365.1673  [place] Crisis Hotline:    1-500.985.4060 or 957-738-8336         ED Discharge Follow Up Questions    1. In order to provide you with very good care, we would like to follow up with a phone call in the next few days.  May we have your permission to contact you?     YES /  NO    2. What is the best phone number to call you? (       )_____-__________    3. What is the best time to call you?      Morning  /  Afternoon  /   Evening                   Patient Signature:  ____________________________________________________________    Date:  ____________________________________________________________

## 2017-03-18 NOTE — ED NOTES
Pt c/o abd pain and blood in stool when wiping. Pt checked in yesterday for same but left before seeing a ERP due to wait times.

## 2017-03-18 NOTE — LETTER
"  FORM C-4:  EMPLOYEE’S CLAIM FOR COMPENSATION/ REPORT OF INITIAL TREATMENT  EMPLOYEE’S CLAIM - PROVIDE ALL INFORMATION REQUESTED   First Name  Angelina Last Name  Dior Birthdate             Age  1985 31 y.o. 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 (if applicable)  lifting 2-12 pack case of soda    How did this injury or occupational disease occur? Be specific and answer in detail. Use additional sheet if necessary)  I was lifting cans   If you believe that you have an occupational disease, when did you first have knowledge of the disability and it relationship to your employment?  \ Witnesses to the Accident  none     Nature of Injury or Occupational Disease  Strain  Part(s) of Body Injured or Affected  Abdomen Including Groin, N/A, N/A    I certify that the above is true and correct to the best of my knowledge and that I have provided this information in order to obtain the benefits of [place] " [place]  I hereby authorize any physician, chiropractor, surgeon, practitioner, or other person, any hospital, including [facility] hospital, any medical service organization, any insurance company, or other institution or organization to release to each other, any medical or other information, including benefits paid or payable, pertinent to this injury or disease, except information relative to diagnosis, treatment and/or counseling for AIDS, psychological conditions, alcohol or controlled substances, for which I must give specific authorization.  A Photostat of this authorization shall be as valid as the original.   Date    03/18/2017 Place  [facility] Employee’s Signature   THIS REPORT MUST BE COMPLETED AND MAILED WITHIN 3 WORKING DAYS OF TREATMENT   Place  [facility], EMERGENCY DEPT  Name of Facility   [facility]   Date  3/18/2017 Diagnosis  (K92.1) Blood in stool  (R19.7) Diarrhea, unspecified type Is there evidence the injured employee was under the influence of alcohol and/or another controlled substance at the time of accident?   Hour  1:24 PM Description of Injury or Disease  Blood in stool  Diarrhea, unspecified type     Treatment     Have you advised the patient to remain off work five days or more?             X-Ray Findings      If Yes   From Date    To Date      From information given by the employee, together with medical evidence, can you directly connect this injury or occupational disease as job incurred?    If No, is the employee capable of: Full Duty    Modified Duty      Is additional medical care by a physician indicated?    If Modified Duty, Specify any Limitations / Restrictions        Do you know of any previous injury or disease contributing to this condition or occupational disease?      Date  3/18/2017 Print Doctor’s Name  Mimi Castaneda I certify the employer’s  "copy of this form was mailed on:   Address  [address]  [place] [id number] [phone] Insurer’s Use Only   [facility]  [address]    Provider’s Tax ID Number  [id number] Telephone  Dept: [phone]    Doctor’s Signature    Degree       Original - TREATING PHYSICIAN OR CHIROPRACTOR   Pg 2-Insurer/TPA   Pg 3-Employer   Pg 4-Employee                                                                                                  Form C-4 (rev01/03)     BRIEF DESCRIPTION OF RIGHTS AND BENEFITS  (Pursuant to [place] 616C.[place])    Notice of Injury or Occupational Disease (Incident Report Form C-1): If an injury or occupational disease (OD) arises out of and in the course of employment, you must provide written notice to your employer as soon as practicable, but no later than 7 days after the accident or OD. Your employer shall maintain a sufficient supply of the required forms.    Claim for Compensation (Form C-4): If medical treatment is sought, the form C-4 is available at the place of initial treatment. A completed \"Claim for Compensation\" (Form C-4) must be filed within 90 days after an accident or OD. The treating physician or chiropractor must, within 3 working days after treatment, complete and mail to the employer, the employer's insurer and third-party , the Claim for Compensation.    Medical Treatment: If you require medical treatment for your on-the-job injury or OD, you may be required to select a physician or chiropractor from a list provided by your workers’ compensation insurer, if it has contracted with an Organization for Managed Care (MCO) or Preferred Provider Organization (PPO) or providers of health care. If your employer has not entered into a contract with an MCO or PPO, you may select a physician or chiropractor from the Panel of Physicians and Chiropractors. Any medical costs related to your industrial injury or OD will be paid by your insurer.    Temporary " Total Disability (TTD): If your doctor has certified that you are unable to work for a period of at least 5 consecutive days, or 5 cumulative days in a 20-day period, or places restrictions on you that your employer does not accommodate, you may be entitled to TTD compensation.    Temporary Partial Disability (TPD): If the wage you receive upon reemployment is less than the compensation for TTD to which you are entitled, the insurer may be required to pay you TPD compensation to make up the difference. TPD can only be paid for a maximum of 24 months.    Permanent Partial Disability (PPD): When your medical condition is stable and there is an indication of a PPD as a result of your injury or OD, within 30 days, your insurer must arrange for an evaluation by a rating physician or chiropractor to determine the degree of your PPD. The amount of your PPD award depends on the date of injury, the results of the PPD evaluation and your age and wage.    Permanent Total Disability (PTD): If you are medically certified by a treating physician or chiropractor as permanently and totally disabled and have been granted a PTD status by your insurer, you are entitled to receive monthly benefits not to exceed 66 2/3% of your average monthly wage. The amount of your PTD payments is subject to reduction if you previously received a PPD award.    Vocational Rehabilitation Services: You may be eligible for vocational rehabilitation services if you are unable to return to the job due to a permanent physical impairment or permanent restrictions as a result of your injury or occupational disease.    Transportation and Per Cuco Reimbursement: You may be eligible for travel expenses and per cuco associated with medical treatment.  Reopening: You may be able to reopen your claim if your condition worsens after claim closure.    Appeal Process: If you disagree with a written determination issued by the insurer or the insurer does not respond to  your request, you may appeal to the Department of Administration, , by following the instructions contained in your determination letter. You must appeal the determination within 70 days from the date of the determination letter at [address], [address], [place], [place] [address], or [address], [address], [place], [place] [address]. If you disagree with the  decision, you may appeal to the Department of Administration, . You must file your appeal within 30 days from the date of the  decision letter at [address], [address], [place], [place] [address], or [address], [address], [place], [place] [address]. If you disagree with a decision of an , you may file a petition for judicial review with the District Court. You must do so within 30 days of the Appeal Officer’s decision. You may be represented by an  at your own expense or you may contact the [facility] for possible representation.    [place]  for Injured Workers (NAIW): If you disagree with a  decision, you may request that NAIW represent you without charge at an  Hearing. For information regarding denial of benefits, you may contact the [facility] at: [address], [address], [place] [address], [phone], or [address], [place] [address], [phone]    To File a Complaint with the Division: If you wish to file a complaint with the  of the Division of Industrial Relations (DIR), please contact the Workers’ Compensation Section, [address], [address], [place], [place] [address], telephone [phone], or [address], [place], [place] [address], telephone [phone].    For assistance with Workers’ Compensation Issues: you may contact the Office of the Governor Consumer Health Assistance, [address], [place], [place] [address], Toll Free 1-584.318.2436, Web site: http://[email or web address] access [email or web address]., E-mail [email or web address].                                                                                                                                                                               __________________________________________________________________                                    _________________            Employee Name / Signature                                                                                                                            Date                                       D-2 (rev. 10/07)

## 2017-03-18 NOTE — ED AVS SNAPSHOT
SwapDrive Access Code: [email or web address]  Expires: 4/17/2017  1:52 PM    Your email address is not on file at [facility].  Email Addresses are required for you to sign up for SwapDrive, please contact [phone] to verify your personal information and to provide your email address prior to attempting to register for SwapDrive.    Angelina Rader  [address] [place] [address]    SwapDrive  A secure, online tool to manage your health information     [facility]’s SwapDrive® is a secure, online tool that connects you to your personalized health information from the privacy of your home -- day or night - making it very easy for you to manage your healthcare. Once the activation process is completed, you can even access your medical information using the SwapDrive apryl, which is available for free in the Apple Apryl store or Google Play store.     To learn more about SwapDrive, visit [email or web address]    There are two levels of access available (as shown below):   My Chart Features  [facility] Primary Care Doctor [facility]  Specialists [facility]  Urgent  Care Non-[facility] Primary Care Doctor   Email your healthcare team securely and privately 24/7 X X X    Manage appointments: schedule your next appointment; view details of past/upcoming appointments X      Request prescription refills. X      View recent personal medical records, including lab and immunizations X X X X   View health record, including health history, allergies, medications X X X X   Read reports about your outpatient visits, procedures, consult and ER notes X X X X   See your discharge summary, which is a recap of your hospital and/or ER visit that includes your diagnosis, lab results, and care plan X X  X     How to register for [employer]:  Once your e-mail address has been verified, follow the following steps to sign up for SwapDrive.     1. Go to  [email or web address]  2. Click on the Sign Up Now box, which takes you to the New Member Sign Up page. You will need to  provide the following information:  a. Enter your [employer] Access Code exactly as it appears at the top of this page. (You will not need to use this code after you’ve completed the sign-up process. If you do not sign up before the expiration date, you must request a new code.)   b. Enter your date of birth.   c. Enter your home email address.   d. Click Submit, and follow the next screen’s instructions.  3. Create a Salesforce ID. This will be your Salesforce login ID and cannot be changed, so think of one that is secure and easy to remember.  4. Create a Salesforce password. You can change your password at any time.  5. Enter your Password Reset Question and Answer. This can be used at a later time if you forget your password.   6. Enter your e-mail address. This allows you to receive e-mail notifications when new information is available in Salesforce.  7. Click Sign Up. You can now view your health information.    For assistance activating your Salesforce account, call [phone]

## 2017-03-18 NOTE — ED NOTES
30 y/o female ambulate to triage   Chief Complaint   Patient presents with   • Abdominal Pain     started today    • Rectal Bleeding     with wiping

## 2017-03-18 NOTE — ED PROVIDER NOTES
"ED Provider Note    CHIEF COMPLAINT  Chief Complaint   Patient presents with   • Bloody Stools     Patient is a difficult/vague historian  HPI  Angelina Rader is a 31 y.o. female who presents complaining of bright red blood in the stool.    Patient noted blood in the stool 2 days ago after having diarrhea for one day following eating [employer]. She began vomiting shortly after ingesting the [employer] meal and then developed the diarrhea that has now mostly resolved. She describes epigastric and lower abdominal pain that are crampy, nonradiating, and intermittent. No aggravating or alleviating factors are identified. Patient is unclear how much blood she has past but denies clots, melena, history of GI bleed.      Taking ibuprofen for dental pain and chronic headaches    ALLERGIES  No Known Allergies    CURRENT MEDICATIONS  Home Medications     Reviewed by Blanca Valdez R.N. (Registered Nurse) on 03/18/17 at 1100  Med List Status: Complete    Medication Last Dose Status    ibuprofen (MOTRIN) 400 MG Tab unknown Active                PAST MEDICAL HISTORY   has a past medical history of Urinary tract infection, site not specified and Psychiatric problem.    SURGICAL HISTORY   has past surgical history that includes dilation and curettage (2011); abdominal exploration (2013); cholecystectomy; and primary c section (5/25/2015).    SOCIAL HISTORY  Social History     Social History Main Topics   • Smoking status: Former Smoker -- 9 years     Quit date: 12/04/2014   • Smokeless tobacco: Not on file      Comment: Pt. states quit when she found out she was pregnant.    • Alcohol Use: No   • Drug Use: No   • Sexual Activity:     Partners: Male      Comment: None     Here with mother and three daughters    REVIEW OF SYSTEMS  See HPI for further details.  All other systems are negative except as above in HPI.      PHYSICAL EXAM  VITAL SIGNS: BP 95/64 mmHg  Pulse 90  Temp(Src) 36.9 °C (98.4 °F)  Resp 16  Ht 1.626 m (5' 4\") "  Wt 69.854 kg (154 lb)  BMI 26.42 kg/m2  SpO2 97%  LMP 02/21/2017 (Exact Date)  Breastfeeding? No    General:  WDWN, nontoxic appearing in NAD; A+Ox3; V/S as above   Skin: warm and dry; good color; no rash  HEENT: NCAT; EOMs intact; PERRL; no scleral icterus   Neck: FROM; no meningismus, no LAD  Cardiovascular: Regular heart rate and rhythm.  No murmurs, rubs, or gallops; pulses 2+ bilaterally radially and DP areas  Lungs: Clear to auscultation with good air movement bilaterally.  No wheezes, rhonchi, or rales.   Abdomen: BS present; soft; NTND; no rebound, guarding, or rigidity.  No organomegaly or pulsatile mass; no CVAT   Extremities: ALCAZAR x 4; no e/o trauma; no pedal edema  Neurologic: CNs III-XII grossly intact; speech clear; distal sensation intact; strength 5/5 UE/LEs; gait steady  Psychiatric: Appropriate affect, normal mood  Rectal:  No external lesions, guaiac neg smear of brown stool, no masses palpable    LABS  Results for orders placed or performed during the hospital encounter of 03/18/17   CBC WITH DIFFERENTIAL   Result Value Ref Range    WBC 6.2 4.8 - 10.8 K/uL    RBC 4.72 4.20 - 5.40 M/uL    Hemoglobin 13.6 12.0 - 16.0 g/dL    Hematocrit 41.4 37.0 - 47.0 %    MCV 87.7 81.4 - 97.8 fL    MCH 28.8 27.0 - 33.0 pg    MCHC 32.9 (L) 33.6 - 35.0 g/dL    RDW 43.3 35.9 - 50.0 fL    Platelet Count 224 164 - 446 K/uL    MPV 9.3 9.0 - 12.9 fL    Neutrophils-Polys 65.80 44.00 - 72.00 %    Lymphocytes 22.40 22.00 - 41.00 %    Monocytes 8.60 0.00 - 13.40 %    Eosinophils 2.60 0.00 - 6.90 %    Basophils 0.30 0.00 - 1.80 %    Immature Granulocytes 0.30 0.00 - 0.90 %    Nucleated RBC 0.00 /100 WBC    Neutrophils (Absolute) 4.04 2.00 - 7.15 K/uL    Lymphs (Absolute) 1.38 1.00 - 4.80 K/uL    Monos (Absolute) 0.53 0.00 - 0.85 K/uL    Eos (Absolute) 0.16 0.00 - 0.51 K/uL    Baso (Absolute) 0.02 0.00 - 0.12 K/uL    Immature Granulocytes (abs) 0.02 0.00 - 0.11 K/uL    NRBC (Absolute) 0.00 K/uL   PROTHROMBIN TIME    Result Value Ref Range    PT 12.7 12.0 - 14.6 sec    INR 0.93 0.87 - 1.13       MEDICAL RECORD  I have reviewed patient's medical record and pertinent results are listed below.      COURSE & MEDICAL DECISION MAKING  I have reviewed any medical record information, laboratory studies and radiographic results as noted.    Angelina Rader is a 31 y.o. female who presents complaining of blood in stool.    Pt tachy at triage but not on my exam.      Pt was re-evaluated at 12:11 PM  I entered the room to discuss labs with the patient which showed no anemia.  She was brief in her answers and nonspecific.  She started scrolling through music on her phone and not making eye contact with me.  I advised her that I will return when she is ready to communicate with me.    1:23 PM  I re-entered the room. The patient apologized and explained that her behavior earlier was due to increased stress and irritation with her family/mother in the room. She agreed to getting undressed again and having a rectal exam. This was performed with a female scribe chaperone in the room.    I explained to the patient that she may have had food poisoning and that we should obtain stool studies to ensure no bacterial infection. She is given outpatient lab slip for this. She is also given a referral to gastroenterology for continued blood in her stool and a referral to the [facility] for establishment of PCP. Patient was advised to return to the ER for persistent abdominal pain over the next 8-12 hours as no imaging was obtained here today nor was it indicated. Patient agrees with this and understands the instructions to also discontinue Motrin as it may be contributing to gastritis and stomach irritation. I've written a prescription for Prilosec for the patient to start 20 mg daily.      Pt's blood pressure was noted to be above 120/80 here in the ER.  Pt was informed and advised to follow up as an outpatient for recheck for possible dx/management of  hypertension.      FINAL IMPRESSION  1. Blood in stool    2. Diarrhea, unspecified type             Electronically signed by: Mimi Castaneda, 3/18/2017 11:02 AM

## 2017-03-18 NOTE — ED NOTES
Reports her abd pain is low in abd and abd feels hard.  Denies urinary complaints.  States had diarrhea, nausea and vomiting on thurs after eating at [employer].

## 2017-03-18 NOTE — ED NOTES
Walks to [facility]AY[facility] loera with steady gait.  Verbalizes understanding of her DC instructions and medication.

## 2017-05-11 ENCOUNTER — NON-PROVIDER VISIT (OUTPATIENT)
Dept: URGENT CARE | Facility: CLINIC | Age: 32
End: 2017-05-11

## 2017-05-11 DIAGNOSIS — Z02.1 PRE-EMPLOYMENT DRUG SCREENING: ICD-10-CM

## 2017-05-11 LAB
AMP AMPHETAMINE: NORMAL
COC COCAINE: NORMAL
INT CON NEG: NORMAL
INT CON POS: NORMAL
MET METHAMPHETAMINES: NORMAL
OPI OPIATES: NORMAL
PCP PHENCYCLIDINE: NORMAL
POC DRUG COMMENT 753798-OCCUPATIONAL HEALTH: NORMAL
THC: NORMAL

## 2017-05-11 PROCEDURE — 80305 DRUG TEST PRSMV DIR OPT OBS: CPT | Performed by: PHYSICIAN ASSISTANT

## 2017-08-11 ENCOUNTER — NON-PROVIDER VISIT (OUTPATIENT)
Dept: URGENT CARE | Facility: PHYSICIAN GROUP | Age: 32
End: 2017-08-11

## 2017-08-11 DIAGNOSIS — Z02.1 PRE-EMPLOYMENT DRUG SCREENING: ICD-10-CM

## 2017-08-11 LAB
AMP AMPHETAMINE: NORMAL
COC COCAINE: NORMAL
INT CON NEG: NEGATIVE
INT CON POS: POSITIVE
MET METHAMPHETAMINES: NORMAL
OPI OPIATES: NORMAL
PCP PHENCYCLIDINE: NORMAL
POC DRUG COMMENT 753798-OCCUPATIONAL HEALTH: NORMAL
THC: NORMAL

## 2017-08-11 PROCEDURE — 80305 DRUG TEST PRSMV DIR OPT OBS: CPT | Performed by: PHYSICIAN ASSISTANT

## 2019-01-01 ENCOUNTER — APPOINTMENT (OUTPATIENT)
Dept: RADIOLOGY | Facility: MEDICAL CENTER | Age: 34
End: 2019-01-01
Attending: EMERGENCY MEDICINE
Payer: MEDICAID

## 2019-01-01 ENCOUNTER — HOSPITAL ENCOUNTER (EMERGENCY)
Facility: MEDICAL CENTER | Age: 34
End: 2019-01-01
Attending: EMERGENCY MEDICINE
Payer: MEDICAID

## 2019-01-01 VITALS
WEIGHT: 159.83 LBS | DIASTOLIC BLOOD PRESSURE: 66 MMHG | OXYGEN SATURATION: 96 % | SYSTOLIC BLOOD PRESSURE: 97 MMHG | RESPIRATION RATE: 16 BRPM | TEMPERATURE: 98.5 F | HEART RATE: 78 BPM | BODY MASS INDEX: 27.29 KG/M2 | HEIGHT: 64 IN

## 2019-01-01 DIAGNOSIS — Z34.90 INTRAUTERINE PREGNANCY: ICD-10-CM

## 2019-01-01 DIAGNOSIS — N39.0 ACUTE UTI: ICD-10-CM

## 2019-01-01 LAB
APPEARANCE UR: CLEAR
COLOR UR AUTO: YELLOW
GLUCOSE UR QL STRIP.AUTO: NEGATIVE MG/DL
HCG UR QL: POSITIVE
KETONES UR QL STRIP.AUTO: NEGATIVE MG/DL
LEUKOCYTE ESTERASE UR QL STRIP.AUTO: ABNORMAL
NITRITE UR QL STRIP.AUTO: NEGATIVE
PH UR STRIP.AUTO: 7 [PH]
PROT UR QL STRIP: NEGATIVE MG/DL
RBC UR QL AUTO: ABNORMAL
SP GR UR: 1.01

## 2019-01-01 PROCEDURE — 99284 EMERGENCY DEPT VISIT MOD MDM: CPT

## 2019-01-01 PROCEDURE — 81002 URINALYSIS NONAUTO W/O SCOPE: CPT

## 2019-01-01 PROCEDURE — 76817 TRANSVAGINAL US OBSTETRIC: CPT

## 2019-01-01 PROCEDURE — 81025 URINE PREGNANCY TEST: CPT

## 2019-01-01 RX ORDER — NITROFURANTOIN MACROCRYSTALS 100 MG/1
100 CAPSULE ORAL 2 TIMES DAILY
Qty: 14 CAP | Refills: 0 | Status: SHIPPED | OUTPATIENT
Start: 2019-01-01 | End: 2019-01-08

## 2019-01-01 ASSESSMENT — PAIN SCALES - GENERAL: PAINLEVEL_OUTOF10: 5

## 2019-01-01 NOTE — ED PROVIDER NOTES
"ED Provider Note    Scribed for Dr. Jewel Reza M.D. by Jeremiah Feng. 2019  3:35 PM    Primary care provider: Pt reports no PCP.  Means of arrival: walk-in  History obtained from: patient  History limited by: none    CHIEF COMPLAINT  Chief Complaint   Patient presents with   • Vaginal Discharge     c/o \"orange to reddish color\" vaginal discharge. states \"its not vaginal bleeding its just discharge\".    • Pregnancy     did home pregnancy test w/c came back positive, since then been feeling dizzy since she became pregnant. pt is . unsure how far along he is on her pregnancy. LMP was=.       HPI  Angelina Rader is a 33 y.o. female who presents to the Emergency Department for abdominal pain and vaginal discharge with positive home pregnancy test. She reports that she previously had a urinary tract infection about 2 weeks ago that she attributed to delaying her menstrual period. She then thought she started menstruating 1.5 weeks ago due to vaginal spotting. A home pregnancy test 1 week ago was positive. She reports severe waxing and waning lower abdominal cramping onset about 1 week ago that occurs daily, left worse than right. 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 Denies alleviating and exacerbating factors.    REVIEW OF SYSTEMS  Pertinent positives include lower quadrant abdominal pain, vaginal discharge, fatigue, visual disturbance, dizziness. Pertinent negatives include no fever. As above, all other systems reviewed and are negative. C.  See HPI for further details.     PAST MEDICAL HISTORY   has a past medical history of Psychiatric problem and Urinary tract infection, site not specified.    SURGICAL HISTORY   has a past surgical history that includes dilation and curettage (); abdominal exploration (); cholecystectomy; and primary c " "section (5/25/2015).    SOCIAL HISTORY  Social History   Substance Use Topics   • Smoking status: Former Smoker     Years: 9.00     Quit date: 12/4/2014   • Smokeless tobacco: Not on file      Comment: Pt. states quit when she found out she was pregnant.    • Alcohol use No      History   Drug Use No       FAMILY HISTORY  No family history noted      CURRENT MEDICATIONS  No current facility-administered medications for this encounter.     Current Outpatient Prescriptions:   •  ibuprofen (MOTRIN) 400 MG Tab, Take 400 mg by mouth every 6 hours as needed., Disp: , Rfl:   •  omeprazole (PRILOSEC) 20 MG delayed-release capsule, Take 1 Cap by mouth every day., Disp: 30 Cap, Rfl: 0      ALLERGIES  No Known Allergies    PHYSICAL EXAM  VITAL SIGNS: /81   Pulse 70   Temp 36.1 °C (96.9 °F) (Temporal)   Resp 14   Ht 1.626 m (5' 4\")   Wt 72.5 kg (159 lb 13.3 oz)   SpO2 98%   BMI 27.44 kg/m²     Constitutional: Well developed, Well nourished, no distress, Non-toxic appearance.   HENT: Normocephalic, Atraumatic, Bilateral external ears normal, Oropharynx moist, No oral exudates.   Eyes: PERRLA, EOMI, Conjunctiva normal, No discharge.   Neck: No tenderness, Supple, No stridor.   Lymphatic: No lymphadenopathy noted.   Cardiovascular: Normal heart rate, Normal rhythm.   Thorax & Lungs: Clear to auscultation bilaterally, No respiratory distress, No wheezing, No crackles.   Abdomen: Soft, No masses, No pulsatile masses. Tenderness lower abdomen, L worse than R  Skin: Warm, Dry, No erythema, No rash.   Pelvic Exam: assisted by female nursed. Normal pelvic exam with slight tenderness on palpation of uterus. Uterus not enlarged. No bleeding noted during exam.  Extremities:, No edema No cyanosis.   Musculoskeletal: No tenderness to palpation or major deformities noted.  Intact distal pulses  Neurologic: Awake, alert. Moves all extremities spontaneously.  Psychiatric: Affect normal, Judgment normal, Mood normal. "     LABS  Results for orders placed or performed during the hospital encounter of 19   POC UA   Result Value Ref Range    POC Color Yellow     POC Appearance Clear     POC Glucose Negative Negative mg/dL    POC Ketones Negative Negative mg/dL    POC Specific Gravity 1.015 1.005 - 1.030    POC Blood Trace-intact (A) Negative    POC Urine PH 7.0 5.0 - 8.0    POC Protein Negative Negative mg/dL    POC Nitrites Negative Negative    POC Leukocyte Esterase Moderate (A) Negative   POC URINE PREGNANCY   Result Value Ref Range    POC Urine Pregnancy Test Positive (A) Negative   All labs reviewed by me.    RADIOLOGY  US-OB TRANSVAGINAL ONLY   Final Result      1.  Single live intrauterine gestation of approximately 5 weeks 6 days with estimated date of delivery of 2019.   2.  Probable LEFT ovary corpus luteum cyst.      The radiologist's interpretation of all radiological studies have been reviewed by me  COURSE & MEDICAL DECISION MAKING  Pertinent Labs & Imaging studies reviewed. (See chart for details)    3:35 PM - Patient seen and examined at bedside. Ordered POC urine pregnancy and POC UA to evaluate her symptoms. The differential diagnoses include but are not limited to: threatened , UTI, and tubal pregnancy.    4:10 PM - Performed pelvic exam at this time, assisted by female nurse. Normal pelvic exam with mild tenderness to palpation of uterus. No bleeding noted during exam.    4:17 PM - US-OB was ordered to evaluate for pregnancy.    5:27 PM - We are still waiting for US at this time.    6:25 PM Patient was reevaluated at bedside. Discussed lab and radiology results with the patient and informed them that US indicated intrauterine pregnancy at 5 weeks 6 days. Patient was advised to follow up with OBGYN. Discussed lab results indicating UTI and patient will be discharged with Macrodantin. The patient will be discharged and should return if symptoms worsen or if new symptoms arise. The patient  understands and agrees to plan.     Decision Making:  No evidence of acute abnormality with ultrasound 5-week intrauterine pregnancy, no active bleeding at this time threatened  would seem unlikely no other pathology identified other than probable urinary tract infection which should be treated with medical  The patient will return for new or worsening symptoms and is stable at the time of discharge.    The patient is referred to a primary physician for blood pressure management, diabetic screening, and for all other preventative health concerns.    DISPOSITION:  Patient will be discharged home in stable condition.    FOLLOW UP:  [facility], Emergency Dept  [address] [place] [id number] [phone]          OUTPATIENT MEDICATIONS:  New Prescriptions    NITROFURANTOIN MACRO CRYSTALS (MACRODANTIN) 100 MG CAP    Take 1 Cap by mouth 2 Times a Day for 7 days.     FINAL IMPRESSION  1. Intrauterine pregnancy    2. Acute UTI          Jeremiah POLLOCK (Kelseyibciara), am scribing for, and in the presence of, Jewel Reza M.D..    Electronically signed by: Jeremiah Feng (Lori), 2019    Jewel POLLOCK M.D. personally performed the services described in this documentation, as scribed by Jeremiah Feng in my presence, and it is both accurate and complete.    The note accurately reflects work and decisions made by me.  Jewel Reza  2019  3:34 PM

## 2019-01-01 NOTE — ED TRIAGE NOTES
"Chief Complaint   Patient presents with   • Vaginal Discharge     c/o \"orange to reddish color\" vaginal discharge. states \"its not vaginal bleeding its just discharge\".    • Pregnancy     did home pregnancy test w/c came back positive, since then been feeling dizzy since she became pregnant. pt is . unsure how far along he is on her pregnancy. LMP was=.     Pt concerned about possible miscarriage. Triage process explained to pt and instructed to notify for any worsening symptoms.   "

## 2019-05-27 ENCOUNTER — HOSPITAL ENCOUNTER (OUTPATIENT)
Facility: MEDICAL CENTER | Age: 34
End: 2019-05-27
Attending: SPECIALIST | Admitting: SPECIALIST
Payer: MEDICAID

## 2019-05-27 VITALS
BODY MASS INDEX: 26.66 KG/M2 | HEART RATE: 89 BPM | SYSTOLIC BLOOD PRESSURE: 118 MMHG | TEMPERATURE: 97.6 F | HEIGHT: 65 IN | DIASTOLIC BLOOD PRESSURE: 74 MMHG | WEIGHT: 160 LBS

## 2019-05-27 LAB
A1 MICROGLOB PLACENTAL VAG QL: NEGATIVE
APPEARANCE UR: CLEAR
COLOR UR AUTO: YELLOW
FIBRONECTIN FETAL SPEC QL: NEGATIVE
GLUCOSE UR QL STRIP.AUTO: NEGATIVE MG/DL
KETONES UR QL STRIP.AUTO: NEGATIVE MG/DL
LEUKOCYTE ESTERASE UR QL STRIP.AUTO: ABNORMAL
NITRITE UR QL STRIP.AUTO: NEGATIVE
PH UR STRIP.AUTO: 7 [PH]
PROT UR QL STRIP: NEGATIVE MG/DL
RBC UR QL AUTO: NEGATIVE
SP GR UR: <=1.005

## 2019-05-27 PROCEDURE — 81002 URINALYSIS NONAUTO W/O SCOPE: CPT

## 2019-05-27 PROCEDURE — 84112 EVAL AMNIOTIC FLUID PROTEIN: CPT

## 2019-05-27 PROCEDURE — 82731 ASSAY OF FETAL FIBRONECTIN: CPT

## 2019-05-27 PROCEDURE — 59025 FETAL NON-STRESS TEST: CPT

## 2019-05-28 NOTE — PROGRESS NOTES
1900 Received beside report from leif RN; assuming care of patient; plan of care discussed; all questions answered; patient resting comfortably in bed, denies needs at this time; vital signs in stable condition; patient educated regarding call light system; call light and patient belongings in reach; patient encouraged to call RN for any needs, wants, desires or concerns. Whiteboard updated. Lab results reviewed, previous RN, Karuna Peralta attempted to call Dr. Cook with results, message left.     1906 Dr. Cook called JELANI Peralta RN back, discharge orders received from Dr. Cook by JELANI Peralta RN.      1920 RN to bedside, giving discharge instructions as follows.     General Instructions:  · If you think you are in labor, time contractions (lying on your left side) from the beginning of one contraction to the beginning of the next contraction for at least one hour.  · Increase fluid intake: you should consume 10-12 8 oz glasses of non-caffeinated fluid per day.  · Report any pressure or burning on urination to your physician.  · Monitor fetal movement: If you notice an absence or decrease in fetal movement, drink a large glass of water and rest on your side.  If there is no increase in movement, call your physician or go to the hospital for further evaluation.  · Report any sudden, sharp abdominal pain.  · Report any bleeding.  Spotting or pinkish discharge is normal after vaginal exam.  You may also spot after sexual intercourse.    Pre-term Labor (<37 weeks):  Call your physician or return to the hospital if:  · You have painless regular contractions more than 4 in one hour.  · Your water breaks (remember time and color).  · You have menstrual-like cramps, a low dull backache or pressure in your pelvis or back.  · Your baby does not move enough to complete the daily kick count (10 movements in 2 hours).  · Your baby moves much less often than on the days before or you have not felt your baby move all  day.  · Please review the MEDICATION LIST section of your AFTER VISIT SUMMARY document.  · Take your medication as prescribed      Other Instructions:  Please carefully review your entire AFTER VISIT SUMMARY document for all discharge instructions.    Patient verbalized understanding, al questions answered.

## 2019-05-28 NOTE — PROGRESS NOTES
" EDC -   Pt reports a low lying placenta and history of one prior  section.    1640- pt presents to L&D with c/o abdominal tightening and cramping. Pt states that she was \"cleaning gravestones\" earlier today and since doing that has noticed some cramping and tightening. Pt reports positive fetal movement, denies vaginal bleeding. Pt states she \"doesn't know\" if she is leaking but \"sometimes she notices her underwear are wet when standing up.\"     172- report to Dr Cook, order received for amnisure and discharge if negative. Will update MD if result is positive.    -UCs now detectable, Dr Cook updated, order received for FFN    - sterile speculum exam done, no pooling noted. White discharge present. FFN and amnisure collected, cervix closed.  "

## 2019-06-06 ENCOUNTER — HOSPITAL ENCOUNTER (OUTPATIENT)
Dept: LAB | Facility: MEDICAL CENTER | Age: 34
End: 2019-06-06
Attending: SPECIALIST
Payer: MEDICAID

## 2019-06-06 LAB — FIBRONECTIN FETAL SPEC QL: NEGATIVE

## 2019-06-06 PROCEDURE — 82731 ASSAY OF FETAL FIBRONECTIN: CPT

## 2019-07-13 ENCOUNTER — HOSPITAL ENCOUNTER (OUTPATIENT)
Facility: MEDICAL CENTER | Age: 34
End: 2019-07-13
Attending: SPECIALIST | Admitting: SPECIALIST
Payer: MEDICAID

## 2019-07-13 VITALS — TEMPERATURE: 97.4 F | BODY MASS INDEX: 33.99 KG/M2 | WEIGHT: 204 LBS | HEIGHT: 65 IN

## 2019-07-13 LAB
APPEARANCE UR: ABNORMAL
APPEARANCE UR: ABNORMAL
BACTERIA #/AREA URNS HPF: ABNORMAL /HPF
BILIRUB UR QL STRIP.AUTO: NEGATIVE
COLOR UR AUTO: YELLOW
COLOR UR: YELLOW
EPI CELLS #/AREA URNS HPF: NEGATIVE /HPF
FIBRONECTIN FETAL SPEC QL: NEGATIVE
GLUCOSE UR QL STRIP.AUTO: NEGATIVE MG/DL
GLUCOSE UR STRIP.AUTO-MCNC: NEGATIVE MG/DL
HYALINE CASTS #/AREA URNS LPF: ABNORMAL /LPF
KETONES UR QL STRIP.AUTO: NEGATIVE MG/DL
KETONES UR STRIP.AUTO-MCNC: NEGATIVE MG/DL
LEUKOCYTE ESTERASE UR QL STRIP.AUTO: ABNORMAL
LEUKOCYTE ESTERASE UR QL STRIP.AUTO: ABNORMAL
MICRO URNS: ABNORMAL
NITRITE UR QL STRIP.AUTO: POSITIVE
NITRITE UR QL STRIP.AUTO: POSITIVE
PH UR STRIP.AUTO: 6.5 [PH]
PH UR STRIP.AUTO: 7 [PH]
PROT UR QL STRIP: NEGATIVE MG/DL
PROT UR QL STRIP: NEGATIVE MG/DL
RBC # URNS HPF: ABNORMAL /HPF
RBC UR QL AUTO: NEGATIVE
RBC UR QL AUTO: NEGATIVE
SP GR UR STRIP.AUTO: 1.02
SP GR UR: 1.02
UROBILINOGEN UR STRIP.AUTO-MCNC: 0.2 MG/DL
WBC #/AREA URNS HPF: ABNORMAL /HPF

## 2019-07-13 PROCEDURE — A9270 NON-COVERED ITEM OR SERVICE: HCPCS | Performed by: SPECIALIST

## 2019-07-13 PROCEDURE — 81001 URINALYSIS AUTO W/SCOPE: CPT

## 2019-07-13 PROCEDURE — 82731 ASSAY OF FETAL FIBRONECTIN: CPT

## 2019-07-13 PROCEDURE — 59025 FETAL NON-STRESS TEST: CPT

## 2019-07-13 PROCEDURE — 700102 HCHG RX REV CODE 250 W/ 637 OVERRIDE(OP): Performed by: SPECIALIST

## 2019-07-13 PROCEDURE — 87086 URINE CULTURE/COLONY COUNT: CPT

## 2019-07-13 PROCEDURE — 81002 URINALYSIS NONAUTO W/O SCOPE: CPT

## 2019-07-13 RX ORDER — NITROFURANTOIN 25; 75 MG/1; MG/1
100 CAPSULE ORAL ONCE
Status: COMPLETED | OUTPATIENT
Start: 2019-07-13 | End: 2019-07-13

## 2019-07-13 RX ADMIN — NITROFURANTOIN MONOHYDRATE/MACROCRYSTALLINE 100 MG: 25; 75 CAPSULE ORAL at 20:28

## 2019-07-14 NOTE — PROGRESS NOTES
- received report from SINTIA Hassan. Pt resting quietly with family at side.   - Dr. Cook called and updated on urine culture and negative ffn results, received orders for macrobid 100mg po now and will call in Rx to start in am. Discussed poc with pt.  - macrobid 100mg given po (see mar). Monitors removed, left to change clothes.  - addressed discharge instructions with  labor precautions and UTI instructions, all questions answered. Left off floor with family at side.

## 2019-07-14 NOTE — PROGRESS NOTES
32yo, , edc8/28, 33.3 presents with c/o UCs, dizziness, abdominal pain. Pt denies LOF, vag bleeding. POS fm. EFM and [place] placed. VSS.  Pt reports that her UCs were 5 min apart and unable to sleep through them. She now reports that they are further apart but just as painful. Pt is planning a repeat c/s.   1800 Dr. Cook updated.   1900 Report to SINTIA Jara.

## 2019-07-14 NOTE — DISCHARGE INSTRUCTIONS
General Instructions:  · If you think you are in labor, time contractions (lying on your left side) from the beginning of one contraction to the beginning of the next contraction for at least one hour.  · Increase fluid intake: you should consume 10-12 8 oz glasses of non-caffeinated fluid per day.  · Report any pressure or burning on urination to your physician.  · Monitor fetal movement: If you notice an absence or decrease in fetal movement, drink a large glass of water and rest on your side.  If there is no increase in movement, call your physician or go to the hospital for further evaluation.  · Report any sudden, sharp abdominal pain.  · Report any bleeding.  Spotting or pinkish discharge is normal after vaginal exam.  You may also spot after sexual intercourse.    Pre-term Labor (<37 weeks):  Call your physician or return to the hospital if:  · You have painless regular contractions more than 4 in one hour.  · Your water breaks (remember time and color).  · You have menstrual-like cramps, a low dull backache or pressure in your pelvis or back.  · Your baby does not move enough to complete the daily kick count (10 movements in 2 hours).  · Your baby moves much less often than on the days before or you have not felt your baby move all day.  · Please review the MEDICATION LIST section of your AFTER VISIT SUMMARY document.  · Take your medication as prescribed    Urinary Tract Infection:  · Increase your fluid intake.  Avoid coffee, tea, alysa, and other carbonated drinks.  · Please review the MEDICATION LIST section of your AFTER VISIT SUMMARY document.  · Take medications as prescribed.  · Take Medication until it is gone even if you feel better.  · Notify your physician if there is no improvement in your condition.      Other Instructions:  Please carefully review your entire AFTER VISIT SUMMARY document for all discharge instructions.

## 2019-07-15 LAB
BACTERIA UR CULT: ABNORMAL
BACTERIA UR CULT: ABNORMAL
SIGNIFICANT IND 70042: ABNORMAL
SITE SITE: ABNORMAL
SOURCE SOURCE: ABNORMAL

## 2019-08-19 ENCOUNTER — HOSPITAL ENCOUNTER (OUTPATIENT)
Facility: MEDICAL CENTER | Age: 34
End: 2019-08-20
Attending: SPECIALIST | Admitting: SPECIALIST
Payer: MEDICAID

## 2019-08-19 VITALS
SYSTOLIC BLOOD PRESSURE: 117 MMHG | DIASTOLIC BLOOD PRESSURE: 58 MMHG | RESPIRATION RATE: 20 BRPM | TEMPERATURE: 98.1 F | HEART RATE: 99 BPM

## 2019-08-19 PROCEDURE — 59025 FETAL NON-STRESS TEST: CPT

## 2019-08-20 NOTE — PROGRESS NOTES
2228- Pt presented to unit with UC's since this afternoon and nausea after dinner around 6pm, states she feels light headed. Denies VB, LOF. Abd soft and non tender to touch. Confirms fetal movement. EFM and TOCO applied. Pt previous c/s. SVE 1/thick/high, posterior.POC discussed. Pt verbalized understanding.     2250- Dr. Cristofer Rodríguez notified of pt status. Orders received to IV hydrate with LR or PO hydrate and discharge home if UC's subside.     2255- Pt refused IV hydration. PO hydration given. Pt agreed to consume.     0015- SVE unchanged. Pt states her contractions have subsided as has nausea and she no longer feels light headed. Pt to discharge home with mother.     0025- Discharge instructions given and explained. Pt verbalized understanding.     0026- Pt discharged home with family undelivered.

## 2019-08-22 ENCOUNTER — ANESTHESIA EVENT (OUTPATIENT)
Dept: OBGYN | Facility: MEDICAL CENTER | Age: 34
End: 2019-08-22
Payer: MEDICAID

## 2019-08-24 ENCOUNTER — HOSPITAL ENCOUNTER (INPATIENT)
Facility: MEDICAL CENTER | Age: 34
LOS: 3 days | End: 2019-08-27
Attending: SPECIALIST | Admitting: SPECIALIST
Payer: MEDICAID

## 2019-08-24 ENCOUNTER — ANESTHESIA (OUTPATIENT)
Dept: OBGYN | Facility: MEDICAL CENTER | Age: 34
End: 2019-08-24
Payer: MEDICAID

## 2019-08-24 LAB
BASOPHILS # BLD AUTO: 0.4 % (ref 0–1.8)
BASOPHILS # BLD: 0.04 K/UL (ref 0–0.12)
EOSINOPHIL # BLD AUTO: 0.08 K/UL (ref 0–0.51)
EOSINOPHIL NFR BLD: 0.7 % (ref 0–6.9)
ERYTHROCYTE [DISTWIDTH] IN BLOOD BY AUTOMATED COUNT: 50.4 FL (ref 35.9–50)
ERYTHROCYTE [DISTWIDTH] IN BLOOD BY AUTOMATED COUNT: 52 FL (ref 35.9–50)
HCT VFR BLD AUTO: 35.3 % (ref 37–47)
HCT VFR BLD AUTO: 39.1 % (ref 37–47)
HGB BLD-MCNC: 11.3 G/DL (ref 12–16)
HGB BLD-MCNC: 11.9 G/DL (ref 12–16)
HOLDING TUBE BB 8507: NORMAL
IMM GRANULOCYTES # BLD AUTO: 0.12 K/UL (ref 0–0.11)
IMM GRANULOCYTES NFR BLD AUTO: 1.1 % (ref 0–0.9)
LYMPHOCYTES # BLD AUTO: 2.26 K/UL (ref 1–4.8)
LYMPHOCYTES NFR BLD: 20.1 % (ref 22–41)
MCH RBC QN AUTO: 26.7 PG (ref 27–33)
MCH RBC QN AUTO: 27.8 PG (ref 27–33)
MCHC RBC AUTO-ENTMCNC: 30.4 G/DL (ref 33.6–35)
MCHC RBC AUTO-ENTMCNC: 32 G/DL (ref 33.6–35)
MCV RBC AUTO: 86.9 FL (ref 81.4–97.8)
MCV RBC AUTO: 87.9 FL (ref 81.4–97.8)
MONOCYTES # BLD AUTO: 0.58 K/UL (ref 0–0.85)
MONOCYTES NFR BLD AUTO: 5.1 % (ref 0–13.4)
NEUTROPHILS # BLD AUTO: 8.19 K/UL (ref 2–7.15)
NEUTROPHILS NFR BLD: 72.6 % (ref 44–72)
NRBC # BLD AUTO: 0.02 K/UL
NRBC BLD-RTO: 0.2 /100 WBC
PLATELET # BLD AUTO: 199 K/UL (ref 164–446)
PLATELET # BLD AUTO: 232 K/UL (ref 164–446)
PMV BLD AUTO: 9.4 FL (ref 9–12.9)
PMV BLD AUTO: 9.7 FL (ref 9–12.9)
RBC # BLD AUTO: 4.06 M/UL (ref 4.2–5.4)
RBC # BLD AUTO: 4.45 M/UL (ref 4.2–5.4)
WBC # BLD AUTO: 11.3 K/UL (ref 4.8–10.8)
WBC # BLD AUTO: 15.6 K/UL (ref 4.8–10.8)

## 2019-08-24 PROCEDURE — A9270 NON-COVERED ITEM OR SERVICE: HCPCS | Performed by: ANESTHESIOLOGY

## 2019-08-24 PROCEDURE — 305385 HCHG SURGICAL SERVICES 1/4 HOUR: Performed by: SPECIALIST

## 2019-08-24 PROCEDURE — 700102 HCHG RX REV CODE 250 W/ 637 OVERRIDE(OP): Performed by: ANESTHESIOLOGY

## 2019-08-24 PROCEDURE — 85027 COMPLETE CBC AUTOMATED: CPT

## 2019-08-24 PROCEDURE — 3E0234Z INTRODUCTION OF SERUM, TOXOID AND VACCINE INTO MUSCLE, PERCUTANEOUS APPROACH: ICD-10-PCS | Performed by: SPECIALIST

## 2019-08-24 PROCEDURE — 85025 COMPLETE CBC W/AUTO DIFF WBC: CPT

## 2019-08-24 PROCEDURE — 700111 HCHG RX REV CODE 636 W/ 250 OVERRIDE (IP)

## 2019-08-24 PROCEDURE — 770002 HCHG ROOM/CARE - OB PRIVATE (112)

## 2019-08-24 PROCEDURE — 700105 HCHG RX REV CODE 258: Performed by: ANESTHESIOLOGY

## 2019-08-24 PROCEDURE — 36415 COLL VENOUS BLD VENIPUNCTURE: CPT

## 2019-08-24 PROCEDURE — 90707 MMR VACCINE SC: CPT

## 2019-08-24 PROCEDURE — 306828 HCHG ANES-TIME GENERAL: Performed by: SPECIALIST

## 2019-08-24 PROCEDURE — 59514 CESAREAN DELIVERY ONLY: CPT

## 2019-08-24 PROCEDURE — 700111 HCHG RX REV CODE 636 W/ 250 OVERRIDE (IP): Performed by: ANESTHESIOLOGY

## 2019-08-24 PROCEDURE — 700101 HCHG RX REV CODE 250: Performed by: ANESTHESIOLOGY

## 2019-08-24 PROCEDURE — 304964 HCHG RECOVERY ROOM TIME 1HR: Performed by: SPECIALIST

## 2019-08-24 RX ORDER — CITRIC ACID/SODIUM CITRATE 334-500MG
30 SOLUTION, ORAL ORAL ONCE
Status: COMPLETED | OUTPATIENT
Start: 2019-08-24 | End: 2019-08-24

## 2019-08-24 RX ORDER — IBUPROFEN 600 MG/1
600 TABLET ORAL EVERY 6 HOURS PRN
Status: DISCONTINUED | OUTPATIENT
Start: 2019-08-25 | End: 2019-08-27 | Stop reason: HOSPADM

## 2019-08-24 RX ORDER — SODIUM CHLORIDE, SODIUM GLUCONATE, SODIUM ACETATE, POTASSIUM CHLORIDE AND MAGNESIUM CHLORIDE 526; 502; 368; 37; 30 MG/100ML; MG/100ML; MG/100ML; MG/100ML; MG/100ML
INJECTION, SOLUTION INTRAVENOUS
Status: DISCONTINUED | OUTPATIENT
Start: 2019-08-24 | End: 2019-08-24 | Stop reason: SURG

## 2019-08-24 RX ORDER — SODIUM CHLORIDE, SODIUM LACTATE, POTASSIUM CHLORIDE, CALCIUM CHLORIDE 600; 310; 30; 20 MG/100ML; MG/100ML; MG/100ML; MG/100ML
INJECTION, SOLUTION INTRAVENOUS CONTINUOUS
Status: DISCONTINUED | OUTPATIENT
Start: 2019-08-24 | End: 2019-08-27 | Stop reason: HOSPADM

## 2019-08-24 RX ORDER — ONDANSETRON 2 MG/ML
4 INJECTION INTRAMUSCULAR; INTRAVENOUS EVERY 6 HOURS PRN
Status: DISCONTINUED | OUTPATIENT
Start: 2019-08-24 | End: 2019-08-27 | Stop reason: HOSPADM

## 2019-08-24 RX ORDER — DIPHENHYDRAMINE HYDROCHLORIDE 50 MG/ML
25 INJECTION INTRAMUSCULAR; INTRAVENOUS EVERY 6 HOURS PRN
Status: DISCONTINUED | OUTPATIENT
Start: 2019-08-24 | End: 2019-08-25

## 2019-08-24 RX ORDER — HALOPERIDOL 5 MG/ML
1 INJECTION INTRAMUSCULAR
Status: DISCONTINUED | OUTPATIENT
Start: 2019-08-24 | End: 2019-08-24 | Stop reason: HOSPADM

## 2019-08-24 RX ORDER — OXYCODONE HCL 5 MG/5 ML
10 SOLUTION, ORAL ORAL
Status: DISCONTINUED | OUTPATIENT
Start: 2019-08-24 | End: 2019-08-24 | Stop reason: HOSPADM

## 2019-08-24 RX ORDER — DIPHENHYDRAMINE HYDROCHLORIDE 50 MG/ML
12.5 INJECTION INTRAMUSCULAR; INTRAVENOUS EVERY 6 HOURS PRN
Status: DISCONTINUED | OUTPATIENT
Start: 2019-08-24 | End: 2019-08-25

## 2019-08-24 RX ORDER — MORPHINE SULFATE 4 MG/ML
4 INJECTION, SOLUTION INTRAMUSCULAR; INTRAVENOUS
Status: DISCONTINUED | OUTPATIENT
Start: 2019-08-24 | End: 2019-08-27 | Stop reason: HOSPADM

## 2019-08-24 RX ORDER — SODIUM CHLORIDE, SODIUM GLUCONATE, SODIUM ACETATE, POTASSIUM CHLORIDE AND MAGNESIUM CHLORIDE 526; 502; 368; 37; 30 MG/100ML; MG/100ML; MG/100ML; MG/100ML; MG/100ML
1500 INJECTION, SOLUTION INTRAVENOUS ONCE
Status: COMPLETED | OUTPATIENT
Start: 2019-08-24 | End: 2019-08-24

## 2019-08-24 RX ORDER — BUPIVACAINE HYDROCHLORIDE 7.5 MG/ML
INJECTION, SOLUTION INTRASPINAL PRN
Status: DISCONTINUED | OUTPATIENT
Start: 2019-08-24 | End: 2019-08-24 | Stop reason: SURG

## 2019-08-24 RX ORDER — ACETAMINOPHEN 500 MG
1000 TABLET ORAL EVERY 6 HOURS
Status: DISPENSED | OUTPATIENT
Start: 2019-08-24 | End: 2019-08-25

## 2019-08-24 RX ORDER — ONDANSETRON 4 MG/1
4 TABLET, ORALLY DISINTEGRATING ORAL EVERY 6 HOURS PRN
Status: DISCONTINUED | OUTPATIENT
Start: 2019-08-24 | End: 2019-08-27 | Stop reason: HOSPADM

## 2019-08-24 RX ORDER — MEPERIDINE HYDROCHLORIDE 25 MG/ML
6.25 INJECTION INTRAMUSCULAR; INTRAVENOUS; SUBCUTANEOUS
Status: DISCONTINUED | OUTPATIENT
Start: 2019-08-24 | End: 2019-08-24 | Stop reason: HOSPADM

## 2019-08-24 RX ORDER — KETOROLAC TROMETHAMINE 30 MG/ML
30 INJECTION, SOLUTION INTRAMUSCULAR; INTRAVENOUS EVERY 6 HOURS
Status: DISPENSED | OUTPATIENT
Start: 2019-08-24 | End: 2019-08-25

## 2019-08-24 RX ORDER — DIPHENHYDRAMINE HYDROCHLORIDE 50 MG/ML
25 INJECTION INTRAMUSCULAR; INTRAVENOUS EVERY 6 HOURS PRN
Status: DISCONTINUED | OUTPATIENT
Start: 2019-08-24 | End: 2019-08-27 | Stop reason: HOSPADM

## 2019-08-24 RX ORDER — ONDANSETRON 2 MG/ML
4 INJECTION INTRAMUSCULAR; INTRAVENOUS
Status: DISCONTINUED | OUTPATIENT
Start: 2019-08-24 | End: 2019-08-24 | Stop reason: HOSPADM

## 2019-08-24 RX ORDER — OXYCODONE HYDROCHLORIDE 10 MG/1
10 TABLET ORAL EVERY 4 HOURS PRN
Status: DISCONTINUED | OUTPATIENT
Start: 2019-08-24 | End: 2019-08-25

## 2019-08-24 RX ORDER — DEXAMETHASONE SODIUM PHOSPHATE 4 MG/ML
INJECTION, SOLUTION INTRA-ARTICULAR; INTRALESIONAL; INTRAMUSCULAR; INTRAVENOUS; SOFT TISSUE PRN
Status: DISCONTINUED | OUTPATIENT
Start: 2019-08-24 | End: 2019-08-24 | Stop reason: SURG

## 2019-08-24 RX ORDER — HYDROMORPHONE HYDROCHLORIDE 1 MG/ML
0.2 INJECTION, SOLUTION INTRAMUSCULAR; INTRAVENOUS; SUBCUTANEOUS
Status: DISCONTINUED | OUTPATIENT
Start: 2019-08-24 | End: 2019-08-24 | Stop reason: HOSPADM

## 2019-08-24 RX ORDER — MORPHINE SULFATE 0.5 MG/ML
INJECTION, SOLUTION EPIDURAL; INTRATHECAL; INTRAVENOUS PRN
Status: DISCONTINUED | OUTPATIENT
Start: 2019-08-24 | End: 2019-08-24 | Stop reason: SURG

## 2019-08-24 RX ORDER — SIMETHICONE 80 MG
80 TABLET,CHEWABLE ORAL 4 TIMES DAILY PRN
Status: DISCONTINUED | OUTPATIENT
Start: 2019-08-24 | End: 2019-08-27 | Stop reason: HOSPADM

## 2019-08-24 RX ORDER — DOCUSATE SODIUM 100 MG/1
100 CAPSULE, LIQUID FILLED ORAL 2 TIMES DAILY PRN
Status: DISCONTINUED | OUTPATIENT
Start: 2019-08-24 | End: 2019-08-27 | Stop reason: HOSPADM

## 2019-08-24 RX ORDER — DIPHENHYDRAMINE HYDROCHLORIDE 50 MG/ML
12.5 INJECTION INTRAMUSCULAR; INTRAVENOUS
Status: DISCONTINUED | OUTPATIENT
Start: 2019-08-24 | End: 2019-08-24 | Stop reason: HOSPADM

## 2019-08-24 RX ORDER — METHYLERGONOVINE MALEATE 0.2 MG/ML
INJECTION INTRAVENOUS
Status: COMPLETED
Start: 2019-08-24 | End: 2019-08-24

## 2019-08-24 RX ORDER — OXYCODONE HCL 5 MG/5 ML
5 SOLUTION, ORAL ORAL
Status: DISCONTINUED | OUTPATIENT
Start: 2019-08-24 | End: 2019-08-24 | Stop reason: HOSPADM

## 2019-08-24 RX ORDER — SODIUM CHLORIDE, SODIUM LACTATE, POTASSIUM CHLORIDE, CALCIUM CHLORIDE 600; 310; 30; 20 MG/100ML; MG/100ML; MG/100ML; MG/100ML
INJECTION, SOLUTION INTRAVENOUS PRN
Status: DISCONTINUED | OUTPATIENT
Start: 2019-08-24 | End: 2019-08-27 | Stop reason: HOSPADM

## 2019-08-24 RX ORDER — HYDROMORPHONE HYDROCHLORIDE 1 MG/ML
0.4 INJECTION, SOLUTION INTRAMUSCULAR; INTRAVENOUS; SUBCUTANEOUS
Status: DISCONTINUED | OUTPATIENT
Start: 2019-08-24 | End: 2019-08-24 | Stop reason: HOSPADM

## 2019-08-24 RX ORDER — OXYTOCIN 10 [USP'U]/ML
INJECTION, SOLUTION INTRAMUSCULAR; INTRAVENOUS PRN
Status: DISCONTINUED | OUTPATIENT
Start: 2019-08-24 | End: 2019-08-24 | Stop reason: SURG

## 2019-08-24 RX ORDER — ONDANSETRON 2 MG/ML
INJECTION INTRAMUSCULAR; INTRAVENOUS PRN
Status: DISCONTINUED | OUTPATIENT
Start: 2019-08-24 | End: 2019-08-24 | Stop reason: SURG

## 2019-08-24 RX ORDER — HYDROMORPHONE HYDROCHLORIDE 1 MG/ML
0.4 INJECTION, SOLUTION INTRAMUSCULAR; INTRAVENOUS; SUBCUTANEOUS
Status: DISCONTINUED | OUTPATIENT
Start: 2019-08-24 | End: 2019-08-25

## 2019-08-24 RX ORDER — HYDROMORPHONE HYDROCHLORIDE 1 MG/ML
0.1 INJECTION, SOLUTION INTRAMUSCULAR; INTRAVENOUS; SUBCUTANEOUS
Status: DISCONTINUED | OUTPATIENT
Start: 2019-08-24 | End: 2019-08-24 | Stop reason: HOSPADM

## 2019-08-24 RX ORDER — METHYLERGONOVINE MALEATE 0.2 MG/ML
INJECTION INTRAVENOUS PRN
Status: DISCONTINUED | OUTPATIENT
Start: 2019-08-24 | End: 2019-08-24 | Stop reason: SURG

## 2019-08-24 RX ORDER — KETOROLAC TROMETHAMINE 30 MG/ML
INJECTION, SOLUTION INTRAMUSCULAR; INTRAVENOUS PRN
Status: DISCONTINUED | OUTPATIENT
Start: 2019-08-24 | End: 2019-08-24 | Stop reason: SURG

## 2019-08-24 RX ORDER — OXYCODONE AND ACETAMINOPHEN 10; 325 MG/1; MG/1
1 TABLET ORAL EVERY 4 HOURS PRN
Status: DISCONTINUED | OUTPATIENT
Start: 2019-08-24 | End: 2019-08-24

## 2019-08-24 RX ORDER — METHYLERGONOVINE MALEATE 0.2 MG/ML
0.2 INJECTION INTRAVENOUS
Status: DISCONTINUED | OUTPATIENT
Start: 2019-08-24 | End: 2019-08-27 | Stop reason: HOSPADM

## 2019-08-24 RX ORDER — OXYCODONE HYDROCHLORIDE AND ACETAMINOPHEN 5; 325 MG/1; MG/1
1 TABLET ORAL EVERY 4 HOURS PRN
Status: DISCONTINUED | OUTPATIENT
Start: 2019-08-24 | End: 2019-08-24

## 2019-08-24 RX ORDER — PHENYLEPHRINE HYDROCHLORIDE 10 MG/ML
INJECTION, SOLUTION INTRAMUSCULAR; INTRAVENOUS; SUBCUTANEOUS PRN
Status: DISCONTINUED | OUTPATIENT
Start: 2019-08-24 | End: 2019-08-24 | Stop reason: SURG

## 2019-08-24 RX ORDER — METOCLOPRAMIDE HYDROCHLORIDE 5 MG/ML
10 INJECTION INTRAMUSCULAR; INTRAVENOUS ONCE
Status: COMPLETED | OUTPATIENT
Start: 2019-08-24 | End: 2019-08-24

## 2019-08-24 RX ORDER — ONDANSETRON 2 MG/ML
4 INJECTION INTRAMUSCULAR; INTRAVENOUS EVERY 6 HOURS PRN
Status: DISCONTINUED | OUTPATIENT
Start: 2019-08-24 | End: 2019-08-24

## 2019-08-24 RX ORDER — DIPHENHYDRAMINE HCL 25 MG
25 TABLET ORAL EVERY 6 HOURS PRN
Status: DISCONTINUED | OUTPATIENT
Start: 2019-08-24 | End: 2019-08-27 | Stop reason: HOSPADM

## 2019-08-24 RX ORDER — OXYCODONE HYDROCHLORIDE 5 MG/1
5 TABLET ORAL EVERY 4 HOURS PRN
Status: DISCONTINUED | OUTPATIENT
Start: 2019-08-24 | End: 2019-08-25

## 2019-08-24 RX ORDER — CEFAZOLIN SODIUM 1 G/3ML
INJECTION, POWDER, FOR SOLUTION INTRAMUSCULAR; INTRAVENOUS PRN
Status: DISCONTINUED | OUTPATIENT
Start: 2019-08-24 | End: 2019-08-24 | Stop reason: SURG

## 2019-08-24 RX ADMIN — SODIUM CHLORIDE, SODIUM GLUCONATE, SODIUM ACETATE, POTASSIUM CHLORIDE AND MAGNESIUM CHLORIDE: 526; 502; 368; 37; 30 INJECTION, SOLUTION INTRAVENOUS at 10:24

## 2019-08-24 RX ADMIN — SODIUM CHLORIDE, SODIUM GLUCONATE, SODIUM ACETATE, POTASSIUM CHLORIDE AND MAGNESIUM CHLORIDE: 526; 502; 368; 37; 30 INJECTION, SOLUTION INTRAVENOUS at 09:49

## 2019-08-24 RX ADMIN — FAMOTIDINE 20 MG: 10 INJECTION INTRAVENOUS at 09:36

## 2019-08-24 RX ADMIN — PHENYLEPHRINE HYDROCHLORIDE 100 MCG: 10 INJECTION INTRAVENOUS at 10:00

## 2019-08-24 RX ADMIN — ACETAMINOPHEN 1000 MG: 500 TABLET ORAL at 14:06

## 2019-08-24 RX ADMIN — KETOROLAC TROMETHAMINE 30 MG: 30 INJECTION, SOLUTION INTRAMUSCULAR at 10:30

## 2019-08-24 RX ADMIN — METOCLOPRAMIDE 10 MG: 5 INJECTION, SOLUTION INTRAMUSCULAR; INTRAVENOUS at 09:36

## 2019-08-24 RX ADMIN — METHYLERGONOVINE MALEATE 0.2 MG: 0.2 INJECTION, SOLUTION INTRAMUSCULAR; INTRAVENOUS at 10:27

## 2019-08-24 RX ADMIN — DEXAMETHASONE SODIUM PHOSPHATE 4 MG: 4 INJECTION, SOLUTION INTRA-ARTICULAR; INTRALESIONAL; INTRAMUSCULAR; INTRAVENOUS; SOFT TISSUE at 10:27

## 2019-08-24 RX ADMIN — OXYTOCIN 1000 ML: 10 INJECTION, SOLUTION INTRAMUSCULAR; INTRAVENOUS at 11:01

## 2019-08-24 RX ADMIN — FENTANYL CITRATE 10 MCG: 50 INJECTION, SOLUTION INTRAMUSCULAR; INTRAVENOUS at 09:55

## 2019-08-24 RX ADMIN — SODIUM CHLORIDE, SODIUM GLUCONATE, SODIUM ACETATE, POTASSIUM CHLORIDE AND MAGNESIUM CHLORIDE 1500 ML: 526; 502; 368; 37; 30 INJECTION, SOLUTION INTRAVENOUS at 09:36

## 2019-08-24 RX ADMIN — MEASLES, MUMPS, AND RUBELLA VIRUS VACCINE LIVE 0.5 ML: 1000; 12500; 1000 INJECTION, POWDER, LYOPHILIZED, FOR SUSPENSION SUBCUTANEOUS at 16:57

## 2019-08-24 RX ADMIN — SODIUM CITRATE AND CITRIC ACID MONOHYDRATE 30 ML: 500; 334 SOLUTION ORAL at 09:36

## 2019-08-24 RX ADMIN — MORPHINE SULFATE 0.1 MG: 0.5 INJECTION, SOLUTION EPIDURAL; INTRATHECAL; INTRAVENOUS at 09:55

## 2019-08-24 RX ADMIN — OXYTOCIN 20 UNITS: 10 INJECTION, SOLUTION INTRAMUSCULAR; INTRAVENOUS at 10:24

## 2019-08-24 RX ADMIN — ACETAMINOPHEN 1000 MG: 500 TABLET ORAL at 19:03

## 2019-08-24 RX ADMIN — BUPIVACAINE HYDROCHLORIDE IN DEXTROSE 1.6 ML: 7.5 INJECTION, SOLUTION SUBARACHNOID at 09:55

## 2019-08-24 RX ADMIN — ONDANSETRON 4 MG: 2 INJECTION INTRAMUSCULAR; INTRAVENOUS at 10:28

## 2019-08-24 RX ADMIN — CEFAZOLIN 2 G: 330 INJECTION, POWDER, FOR SOLUTION INTRAMUSCULAR; INTRAVENOUS at 10:03

## 2019-08-24 ASSESSMENT — PAIN SCALES - GENERAL: PAIN_LEVEL: 0

## 2019-08-24 ASSESSMENT — LIFESTYLE VARIABLES
ALCOHOL_USE: NO
EVER_SMOKED: YES

## 2019-08-24 ASSESSMENT — EDINBURGH POSTNATAL DEPRESSION SCALE (EPDS)
THINGS HAVE BEEN GETTING ON TOP OF ME: NO, I HAVE BEEN COPING AS WELL AS EVER
I HAVE BEEN ABLE TO LAUGH AND SEE THE FUNNY SIDE OF THINGS: AS MUCH AS I ALWAYS COULD
I HAVE BLAMED MYSELF UNNECESSARILY WHEN THINGS WENT WRONG: NOT VERY OFTEN
I HAVE FELT SAD OR MISERABLE: NO, NOT AT ALL
I HAVE BEEN ANXIOUS OR WORRIED FOR NO GOOD REASON: HARDLY EVER
THE THOUGHT OF HARMING MYSELF HAS OCCURRED TO ME: NEVER
I HAVE LOOKED FORWARD WITH ENJOYMENT TO THINGS: AS MUCH AS I EVER DID
I HAVE BEEN SO UNHAPPY THAT I HAVE BEEN CRYING: NO, NEVER
I HAVE FELT SCARED OR PANICKY FOR NO GOOD REASON: NO, NOT AT ALL
I HAVE BEEN SO UNHAPPY THAT I HAVE HAD DIFFICULTY SLEEPING: NOT AT ALL

## 2019-08-24 ASSESSMENT — PATIENT HEALTH QUESTIONNAIRE - PHQ9
2. FEELING DOWN, DEPRESSED, IRRITABLE, OR HOPELESS: NOT AT ALL
1. LITTLE INTEREST OR PLEASURE IN DOING THINGS: NOT AT ALL
SUM OF ALL RESPONSES TO PHQ9 QUESTIONS 1 AND 2: 0

## 2019-08-24 NOTE — LACTATION NOTE
Initial visit. MOB reports she  first baby and plentiful milk supply. She is concerned about engorgement with this baby. +breast growth during pregnancy. Baby at breast with deep, comfortable latch, swallowing visualized. Discussed hunger cues and offering breast with every cue without time restrictions. Reviewed engorgement comfort measures per MOB questions. Encouraged to call for latch assistance as needed.

## 2019-08-24 NOTE — ANESTHESIA TIME REPORT
Anesthesia Start and Stop Event Times     Date Time Event    8/24/2019 0914 Ready for Procedure     0949 Anesthesia Start     1116 Anesthesia Stop        Responsible Staff  08/24/19    Name Role Begin End    Sameer Hanknis M.D. Anesth 0949 1116        Preop Diagnosis (Free Text):  Pre-op Diagnosis     IUP 39w3d, hx of C/S        Preop Diagnosis (Codes):    Post op Diagnosis  Pregnancy      Premium Reason  E. Weekend    Comments:

## 2019-08-24 NOTE — ANESTHESIA PREPROCEDURE EVALUATION
at 39.6.  Prior c/s.  Plan repeat C/S.    Relevant Problems   No relevant active problems     Vitals:    19 0723   BP: 119/75   Pulse: 82   Temp: 36.4 °C (97.6 °F)     Recent Labs     19  0750   WBC 11.3*   RBC 4.45   HEMOGLOBIN 11.9*   HEMATOCRIT 39.1   MCV 87.9   MCH 26.7*   RDW 52.0*   PLATELETCT 232   MPV 9.7   NEUTSPOLYS 72.60*   LYMPHOCYTES 20.10*   MONOCYTES 5.10   EOSINOPHILS 0.70   BASOPHILS 0.40         Physical Exam    Airway   Mallampati: II  TM distance: >3 FB  Neck ROM: full       Cardiovascular - normal exam  Rhythm: regular  Rate: normal  (-) murmur     Dental - normal exam         Pulmonary - normal exam  Breath sounds clear to auscultation     Abdominal    Neurological - normal exam                 Anesthesia Plan    ASA 2       Plan - spinal   Neuraxial block will be primary anesthetic            Postoperative Plan: Postoperative administration of opioids is intended.    Pertinent diagnostic labs and testing reviewed    Informed Consent:    Anesthetic plan and risks discussed with patient.

## 2019-08-24 NOTE — PROGRESS NOTES
, EDC , GA 39w3d. Pt presents to L&D for scheduled repeat C/S. Pt denies LOF or VB, reports occasional UCs, and reports + fetal movement. Pt escorted to triage room, oriented to room and unit, and external monitors applied. POC discussed with pt and encouraged to state needs or questions at any time.    0841 Dr. Hankins at bedside, POC discussed with pt.    0930 Dr. Cook at bedside, POC discussed with pt.    0945 Pt transferred to OR ambulatory.    1221 Pt transferred to  via gurney with side rails up    1230 Report given to Leslie PATRICIO at bedside. Bands verified and cuddles active.

## 2019-08-24 NOTE — ADDENDUM NOTE
Addendum  created 08/24/19 1230 by Sameer Hankins M.D.    Order list changed, Order sets accessed

## 2019-08-24 NOTE — ANESTHESIA QCDR
2019 [facility] Clinical Data Registry (for Quality Improvement)     Postoperative nausea/vomiting risk protocol (Adult = 18 yrs and Pediatric 3-17 yrs)- (430 and 463)  General inhalation anesthetic (NOT TIVA) with PONV risk factors: Yes  Provision of anti-emetic therapy with at least 2 different classes of agents: Yes   Patient DID NOT receive anti-emetic therapy and reason is documented in Medical Record:  N/A    Multimodal Pain Management- (AQI59)  Patient undergoing Elective Surgery (i.e. Outpatient, or ASC, or Prescheduled Surgery prior to Hospital Admission): No  Use of Multimodal Pain Management, two or more drugs and/or interventions, NOT including systemic opioids: N/A  Exception: Documented allergy to multiple classes of analgesics: N/A    PACU assessment of acute postoperative pain prior to Anesthesia Care End- Applies to Patients Age = 18- (ABG7)  Initial PACU pain score is which of the following: < 7/10  Patient unable to report pain score: N/A    Post-anesthetic transfer of care checklist/protocol to PACU/ICU- (426 and 427)  Upon conclusion of case, patient transferred to which of the following locations: PACU/Non-ICU  Use of transfer checklist/protocol: Yes  Exclusion: Service Performed in Patient Hospital Room (and thus did not require transfer): N/A    PACU Reintubation- (AQI31)  General anesthesia requiring endotracheal intubation (ETT) along with subsequent extubation in OR or PACU: No  Required reintubation in the PACU: N/A  Extubation was a planned trial documented in the medical record prior to removal of the original airway device: N/A    Unplanned admission to ICU related to anesthesia service up through end of PACU care- (MD51)  Unplanned admission to ICU (not initially anticipated at anesthesia start time): No

## 2019-08-24 NOTE — H&P
Angelina Rader          YOB: 1985  Date of today's procedure: 2019  Facility: [facility] Labor and Delivery    ID: The patient is a very pleasant 33-year-old multipara (para-3, and she has had 2 vaginal deliveries followed more recently by 1  section) who is today 39 weeks and 3 days gestation.    Chief Complaint: The patient has no complaints other than for generalized discomfort consistent with term pregnancy.    History of Present Illness: The patient has had her prenatal care with myself during the course of this pregnancy.  With her last pregnancy she was delivered via  section.  She would like to be delivered via a repeat  section with this pregnancy.  She is scheduled to have a repeat  section today.  I have discussed with her and explained to her in detail and at length what a repeat  section is and what a repeat  section involves and I have discussed with her and explained to her in detail and at length the risks and benefits and alternatives of repeat  section.  After our discussions and after answering her questions she told me that she very much wishes for us to proceed with repeat  section.    Past Medical History: The patient says that she has no medical illnesses.    Past Surgical History: The patient has had 1 previous  section.    Medications: The patient says that she takes no medications.    Allergies: The patient says that she has no known drug allergies.    Social History: The patient denies smoking.  She denies consuming alcoholic beverages.  She denies the use of recreational drugs.    Review of Systems  General: The patient denies any fevers, chills, sweats.  Pulmonary: The patient denies any coughing, wheezing, chest pain, shortness of breath.  Cardiovascular: The patient denies any palpitations, dyspnea, chest pain.  Gastrointestinal: The patient denies any  nausea, vomiting, diarrhea, constipation, hematochezia, melena, history of hepatitis, history of jaundice.  Genitourinary: The patient says that she feels fetal movement throughout the day every day.  She has had some contractions.  Musculoskeletal: The patient denies any arthralgias or myalgias other than for hip pain and low back pain consistent with term pregnancy.   Neurological: No headaches or syncope or seizures.     Physical Exam:   Vital Signs: The patient's vital signs are stable and she is afebrile.  General: The patient appears well developed and well nourished and relaxed and alert and comfortable and in no apparent distress.    HEENT :  Normo-cephalic, atraumatic, pupils equal, round, reactive to light and accommodation, extra ocular motions intact, pharynx clear; there is no thyromegaly. There is no cervical lymphadenopathy.  Chest: Heart regular rate and rhythm, with no murmurs or rubs or gallops; the lungs are clear to auscultation bilaterally.  Abdomen: The abdomen is gravid and is about 9 months size.  There is a Pfannenstiel scar..   Pelvic: The cervix is long and closed and high.  Extremities: No clubbing or cyanosis or edema.   Neurological: non-focal.     Assessment:   1.)  Intrauterine pregnancy at 39 weeks and 3 days gestation.  2.)  Previous  section the patient would like to be delivered via a repeat  section.    Plan:   We will proceed today with repeat  section.  Please see above.            ________________________  Regino Cook M.D.

## 2019-08-24 NOTE — PROGRESS NOTES
Assumed pt care. Pt is refusing to feed baby because she is tired at this time. Education provided.

## 2019-08-24 NOTE — OP REPORT
Patient's name: Angelina Rader    Patient's YOB: 1985    DATE OF SERVICE:  2019    PREOPERATIVE DIAGNOSES:    1.  Intrauterine pregnancy at 39 weeks and 3 days gestation.  2.  Previous  section and the patient would like to be delivered via a   repeat  section.    POSTOPERATIVE DIAGNOSES:    1.  Intrauterine pregnancy at 39 weeks and 3 days gestation.  2.  Previous  section and the patient would like to be delivered via a   repeat  section.  3.  Live term male  with Apgar scores of 8 and 9 at 1 and 5 minutes   respectively and  weight of 3,810 grams.      PROCEDURE:  Repeat low transverse  section.    SURGEON:  Regino Cook MD    ASSISTANT:  Dinh Hernández MD    ANESTHESIA:  Spinal.    ANESTHESIOLOGIST:  Sameer Hankins MD    FINDINGS:  1.  Live term male  with Apgar scores of 8 and 9 at 1 and 5 minutes   respectively and a  weight of 3,810 grams.    2.  Normal uterus, normal fallopian tubes bilaterally, and normal ovaries   bilaterally.      SPECIMENS:  None.    COMPLICATIONS:  None.    ESTIMATED BLOOD LOSS:  Approximately 800 mL.    DESCRIPTION OF PROCEDURE:  After the appropriate consents have been obtained,   the patient was taken to the operating room and given spinal anesthesia.  She   is prepped and draped in the dorsal supine position and a Terry catheter is   noted to be in place and draining urine.  Anesthesia was tested and noted to   be excellent.  The lower abdominal horizontal surgical scar (Pfannenstiel   scar) was identified and an incision is made through this scar (Pfannenstiel   incision) using a scalpel.  This incision was then continued deeply through   the subcutaneous tissues using the scalpel and Bovie electrocautery and   hemostasis was maintained with Bovie electrocautery.  The superior aspect of   the fascial incision was doubly grasped with Kocher clamps and undermined from   the  underlying rectus muscles both blunt dissection and Bovie electrocautery.    Next, the inferior aspect of the fascial incision was similarly doubly   grasped with Kocher clamps and undermined from the underlying rectus muscle   with both blunt dissection and Bovie electrocautery.  The midline of the   rectus muscle was identified and separation was created in the midline of the   rectus muscle and developed with blunt dissection with Dior clamps and this   separation was continued superiorly and inferiorly with Bovie electrocautery.    Blunt dissection through the preperitoneal adipose tissues using Dior clamps   allows entry in the peritoneal cavity through the parietal peritoneum and   entry in the parietal peritoneum was extended superiorly and inferiorly with   Bovie electrocautery with care taken to avoid the bladder.  Retractors were   introduced to expose the anterior lower uterine segment.  The serosa overlying   the segment was grasped and incised with Metzenbaum scissors with care taken   to avoid the bladder and this incision is extended bilaterally with Metzenbaum   scissors with care taken to avoid the bladder.  A bladder flap was created   with both sharp dissection with Metzenbaum scissors and blunt dissection.    Retractors were then introduced to expose the anterior lower uterine segment,   which was incised transversely with a scalpel.  Bulging membranes are seen.    The hysterotomy was extended bilaterally with bandage scissors and with blunt   dissection.  Rupture of membranes reveals clear fluid.  A hand was placed in   the intrauterine cavity around baby's head and fundal pressure was used to   deliver baby's head and then to deliver baby's body.  Baby's nasopharynx was   suctioned with a bulb syringe as the umbilical cord was doubly clamped and cut   and baby was handed to the awaiting nursery team.  A segment of cord blood is   set aside in the usual fashion for possible cord gases, which  were not   obtained because the Apgar scores were 8 and 9 at 1 and 5 minutes   respectively.  The placenta was manually removed.  The uterus was exteriorized   and the interior of the uterus was wiped clean of products of conception.  A   ring forceps was used to dilate the cervix.  This pair of ring forceps was   then discarded.  The hysterotomy was reapproximated first with the placement   of 3 interrupted mattress sutures of #1 chromic and then a continuous   interlocking suture of #1 chromic.  Initially, the uterus was boggy and the   patient was given in addition to IV Pitocin, Methergine intramuscularly.  The   uterus subsequently became firm.  A figure-of-eight suture was placed in one   area of the hysterotomy repair using #1 chromic to achieve hemostasis.  The   entire length of the hysterotomy repair was examined and hemostasis was noted   to be excellent.  The uterus and both fallopian tubes and both ovaries are   examined and all were noted to be normal.  The uterus was replaced in the   peritoneal cavity.  Retractors were introduced to expose the anterior lower   uterine segment and once again the entire length of the hysterotomy repair was   examined and hemostasis was noted to be excellent.  Lap and needle counts   reported to be correct at this time.  The parietal peritoneum was identified   and reapproximated with simple continuous suture using 3-0 Vicryl.  The rectus   muscles were reapproximated in midline with placement of multiple interrupted   mattress sutures of 2-0 chromic.  Hemostasis was noted to be excellent.  The   anterior rectus fascia was reapproximated with simple continuous suture using   #1 Vicryl.  The wound was copiously irrigated and drained and hemostasis was   noted to be excellent.  Subcutaneous tissues were reapproximated with simple   continuous suture using 3-0 Vicryl.  Finally, the lower skin edges undermined   in the usual fashion for better cosmetic effect.  The skin was  reapproximated   with placement of many interrupted buried sutures of 4-0 Monocryl placed in   the dermis and at least one such suture was placed for every 1 cm of length   along the entire length of the skin incision.  The skin incision was thus   reapproximated nicely in this way.  The procedure was terminated.  Final lap   and needle counts reported to be correct x2 at the end of the procedure.  The   patient tolerated the procedure well and sent to postanesthesia recovery in   stable condition.       ____________________________________     Regino Cook MD    MED / NTS    DD:  08/24/2019 13:07:03  DT:  08/24/2019 15:09:55    D#:  [id number]  Job#:  [id number]    cc: Sameer Hankins MD, Dinh Hernández MD

## 2019-08-24 NOTE — ANESTHESIA PROCEDURE NOTES
Spinal Block  Performed by: Sameer Hankins M.D.  Authorized by: Sameer Hankins M.D.     Patient Location:  OR  Start Time:  8/24/2019 9:52 AM  End Time:  8/24/2019 9:55 AM  Reason for Block: primary anesthetic    patient identified, IV checked, site marked, risks and benefits discussed, surgical consent, monitors and equipment checked, pre-op evaluation and timeout performed    Patient Position:  Sitting  Prep: ChloraPrep, patient draped and sterile technique    Monitoring:  Blood pressure, continuous pulse oximetry and heart rate  Approach:  Midline  Location:  L3-4  Injection Technique:  Single-shot  Skin infiltration:  Lidocaine  Strength:  1%  Dose:  3ml  Needle Type:  Pencan  Needle Gauge:  25 G  CSF flowing pre/post injection:  Yes  Sensory Level:  T4

## 2019-08-24 NOTE — ANESTHESIA POSTPROCEDURE EVALUATION
Patient: Eight[age] [place]    Procedure Summary     Date:  19 Room / Location:  [facility] OR  / LABOR AND DELIVERY    Anesthesia Start:  949 Anesthesia Stop:      Procedure:   SECTION, REPEAT (N/A Abdomen) Diagnosis:  (same, del)    Surgeon:  Regino Cook M.D. Responsible Provider:  Sameer Hankins M.D.    Anesthesia Type:  spinal ASA Status:  2          Final Anesthesia Type: spinal  Last vitals  BP   Blood Pressure: 112/61    Temp   36.4 °C (97.6 °F)    Pulse   Pulse: 82   Resp        SpO2          Anesthesia Post Evaluation    Patient location during evaluation: PACU  Patient participation: complete - patient participated  Level of consciousness: awake and alert  Pain score: 0    Airway patency: patent  Anesthetic complications: no  Cardiovascular status: hemodynamically stable  Respiratory status: acceptable  Hydration status: euvolemic    PONV: none

## 2019-08-25 PROCEDURE — 700102 HCHG RX REV CODE 250 W/ 637 OVERRIDE(OP): Performed by: OBSTETRICS & GYNECOLOGY

## 2019-08-25 PROCEDURE — A9270 NON-COVERED ITEM OR SERVICE: HCPCS | Performed by: SPECIALIST

## 2019-08-25 PROCEDURE — 700102 HCHG RX REV CODE 250 W/ 637 OVERRIDE(OP): Performed by: SPECIALIST

## 2019-08-25 PROCEDURE — 770002 HCHG ROOM/CARE - OB PRIVATE (112)

## 2019-08-25 PROCEDURE — A9270 NON-COVERED ITEM OR SERVICE: HCPCS | Performed by: ANESTHESIOLOGY

## 2019-08-25 PROCEDURE — 700102 HCHG RX REV CODE 250 W/ 637 OVERRIDE(OP): Performed by: ANESTHESIOLOGY

## 2019-08-25 PROCEDURE — 700111 HCHG RX REV CODE 636 W/ 250 OVERRIDE (IP): Performed by: ANESTHESIOLOGY

## 2019-08-25 PROCEDURE — A9270 NON-COVERED ITEM OR SERVICE: HCPCS | Performed by: OBSTETRICS & GYNECOLOGY

## 2019-08-25 RX ORDER — ACETAMINOPHEN 500 MG
1000 TABLET ORAL EVERY 6 HOURS PRN
Status: DISCONTINUED | OUTPATIENT
Start: 2019-08-25 | End: 2019-08-27 | Stop reason: HOSPADM

## 2019-08-25 RX ADMIN — KETOROLAC TROMETHAMINE 30 MG: 30 INJECTION, SOLUTION INTRAMUSCULAR at 07:43

## 2019-08-25 RX ADMIN — IBUPROFEN 600 MG: 600 TABLET ORAL at 19:37

## 2019-08-25 RX ADMIN — ACETAMINOPHEN 1000 MG: 500 TABLET ORAL at 23:28

## 2019-08-25 RX ADMIN — ACETAMINOPHEN 1000 MG: 500 TABLET ORAL at 07:12

## 2019-08-25 RX ADMIN — IBUPROFEN 600 MG: 600 TABLET ORAL at 13:29

## 2019-08-25 RX ADMIN — ACETAMINOPHEN 1000 MG: 500 TABLET ORAL at 17:04

## 2019-08-25 NOTE — LACTATION NOTE
followup visit. ABBY reports baby has been showing hunger cues, and she is independently latching baby on cue. Baby at breast with deep, comfortable latch. Discussed normal feeding frequency for first 5 days and 6 weeks, including cluster feeding and growth spurts.  ABBY is a WIC client and is signed up for a WIC class on bfdg and pumping, encouraged to call WIC if she is experiencing breastfeeding difficulties, and given info for 1:1 consults through [facility] as well. Encouraged bf circles for ongoing support.

## 2019-08-25 NOTE — CARE PLAN
Problem: Communication  Goal: The ability to communicate needs accurately and effectively will improve  Outcome: PROGRESSING AS EXPECTED  Note:   Patient able to communicate wants and needs and states understanding of call light use.      Problem: Altered physiologic condition related to postoperative  delivery  Goal: Patient physiologically stable as evidenced by normal lochia, palpable uterine involution and vital signs within normal limits  Outcome: PROGRESSING AS EXPECTED  Note:   Patient has light lochia with a firm palpable uterus.  Vital signs WNL.  Assessment will continue.

## 2019-08-25 NOTE — PROGRESS NOTES
0700: Bedside report received, assumed care of pt.     0740: Assessment complete, VSS, fundus firm, lochia scant/light. Pt voiding without difficulty, not passing gas yet, encouraged ambulation. Bonding well with infant, breastfeeding well. Pt states pain is being well controlled and will call for PRN pain meds as needed. POC discussed with pt and family, questions answered, call light within reach.

## 2019-08-25 NOTE — PROGRESS NOTES
"Assessment complete, BORA Terry in place with active order. Pt states pain is 2/10 and will call for PRN pain meds as needed. Pt states she does not want scheduled Tylenol or Toradol, because she \"does not need it.\" Pt was educated on the mechanism and purpose of each medication, states she will take \"only when I need it.\"  POC discussed with pt and family, questions answered, call light within reach.   "

## 2019-08-25 NOTE — PROGRESS NOTES
Name:   Jamie Glasgow   Date/Time:  2019 12:11 PM  Gestational Age:  39w3d  Admit Date:   2019  Admitting Dx:   PREVIOUS  SECTION  Indication for care in labor or delivery    POD# 1 S/P RCS    S:  Abdominal pain no   Ambulating   yes  Tolerating PO  yes  Flatus    yes  Bleeding   Lochia appropriate  Voiding   yes   Dizziness   no  Breast feeding  yes  Breast tenderness  no    O:  Pulse: 93  Blood Pressure: 113/72     Temp  Av.6 °C (97.8 °F)  Min: 36.2 °C (97.1 °F)  Max: 36.9 °C (98.5 °F)  Heart: regular rate and rhythm without gallops or murmurs  Lungs: clear bases  Abdomen: obese and soft/nontender  / incision clean and dry.  Extremities: non-tender  Catheter: DC'd    Intake/Output Summary (Last 24 hours) at 2019 1211  Last data filed at 2019 0200  Gross per 24 hour   Intake --   Output 1200 ml   Net -1200 ml       A:  POD# 1 S/P RCS  Stable/progressing well    P:  Routine C/S Postpartum care, continue pain management, encourage ambulation, anticipate DC POD#2     Dinh Hernández M.D.

## 2019-08-26 PROCEDURE — 700102 HCHG RX REV CODE 250 W/ 637 OVERRIDE(OP): Performed by: OBSTETRICS & GYNECOLOGY

## 2019-08-26 PROCEDURE — A9270 NON-COVERED ITEM OR SERVICE: HCPCS | Performed by: SPECIALIST

## 2019-08-26 PROCEDURE — 700112 HCHG RX REV CODE 229: Performed by: SPECIALIST

## 2019-08-26 PROCEDURE — 700102 HCHG RX REV CODE 250 W/ 637 OVERRIDE(OP): Performed by: SPECIALIST

## 2019-08-26 PROCEDURE — 770002 HCHG ROOM/CARE - OB PRIVATE (112)

## 2019-08-26 PROCEDURE — A9270 NON-COVERED ITEM OR SERVICE: HCPCS | Performed by: OBSTETRICS & GYNECOLOGY

## 2019-08-26 PROCEDURE — 303615 HCHG EPIDURAL/SPINAL ANESTHESIA FOR LABOR

## 2019-08-26 RX ADMIN — IBUPROFEN 600 MG: 600 TABLET ORAL at 02:29

## 2019-08-26 RX ADMIN — IBUPROFEN 600 MG: 600 TABLET ORAL at 08:32

## 2019-08-26 RX ADMIN — ACETAMINOPHEN 1000 MG: 500 TABLET ORAL at 05:38

## 2019-08-26 RX ADMIN — ACETAMINOPHEN 1000 MG: 500 TABLET ORAL at 18:14

## 2019-08-26 RX ADMIN — IBUPROFEN 600 MG: 600 TABLET ORAL at 20:49

## 2019-08-26 RX ADMIN — ACETAMINOPHEN 1000 MG: 500 TABLET ORAL at 12:03

## 2019-08-26 RX ADMIN — DOCUSATE SODIUM 100 MG: 100 CAPSULE, LIQUID FILLED ORAL at 08:32

## 2019-08-26 RX ADMIN — IBUPROFEN 600 MG: 600 TABLET ORAL at 14:38

## 2019-08-26 ASSESSMENT — PATIENT HEALTH QUESTIONNAIRE - PHQ9
SUM OF ALL RESPONSES TO PHQ9 QUESTIONS 1 AND 2: 0
2. FEELING DOWN, DEPRESSED, IRRITABLE, OR HOPELESS: NOT AT ALL
1. LITTLE INTEREST OR PLEASURE IN DOING THINGS: NOT AT ALL

## 2019-08-26 NOTE — PROGRESS NOTES
1900- Report received from SINTIA Nelson    Assessment completed, VSS. POC, feeding frequency, safe sleep, and pain management, all questions answered. Pt requests pain meds as needed.

## 2019-08-26 NOTE — CARE PLAN
Problem: Altered physiologic condition related to postoperative  delivery  Goal: Patient physiologically stable as evidenced by normal lochia, palpable uterine involution and vital signs within normal limits  Outcome: PROGRESSING AS EXPECTED  Note:   Pt is firm and light      Problem: Potential for postpartum infection related to surgical incision, compromised uterine condition, urinary tract or respiratory compromise  Goal: Patient will be afebrile and free from signs and symptoms of infection  Outcome: PROGRESSING AS EXPECTED  Note:   Pt is afebrile, no s/s of infection.

## 2019-08-26 NOTE — PROGRESS NOTES
0700: Bedside report received, assumed care of pt.     0740: Assessment complete, VSS, fundus firm, lochia scant. Pt voiding without difficulty, passing gas, no BM yet. Will give stool softeners per pt request. Bonding well with infant. Pt states pain is being well controlled and would like to continue to receive pain medication (Tylenol and Ibuprofen only, per pt request) on a scheduled basis. POC discussed with pt and family, questions answered, call light within reach.

## 2019-08-27 VITALS
DIASTOLIC BLOOD PRESSURE: 84 MMHG | BODY MASS INDEX: 35.51 KG/M2 | OXYGEN SATURATION: 97 % | HEIGHT: 64 IN | TEMPERATURE: 98.2 F | RESPIRATION RATE: 17 BRPM | WEIGHT: 208 LBS | HEART RATE: 96 BPM | SYSTOLIC BLOOD PRESSURE: 123 MMHG

## 2019-08-27 PROBLEM — O34.219 PREVIOUS CESAREAN SECTION COMPLICATING PREGNANCY: Status: ACTIVE | Noted: 2019-08-27

## 2019-08-27 PROBLEM — Z98.891 PREVIOUS CESAREAN SECTION: Status: ACTIVE | Noted: 2019-08-27

## 2019-08-27 PROCEDURE — A9270 NON-COVERED ITEM OR SERVICE: HCPCS | Performed by: OBSTETRICS & GYNECOLOGY

## 2019-08-27 PROCEDURE — 700102 HCHG RX REV CODE 250 W/ 637 OVERRIDE(OP): Performed by: OBSTETRICS & GYNECOLOGY

## 2019-08-27 PROCEDURE — A9270 NON-COVERED ITEM OR SERVICE: HCPCS | Performed by: SPECIALIST

## 2019-08-27 PROCEDURE — 700112 HCHG RX REV CODE 229: Performed by: SPECIALIST

## 2019-08-27 PROCEDURE — 700102 HCHG RX REV CODE 250 W/ 637 OVERRIDE(OP): Performed by: SPECIALIST

## 2019-08-27 RX ADMIN — DOCUSATE SODIUM 100 MG: 100 CAPSULE, LIQUID FILLED ORAL at 06:07

## 2019-08-27 RX ADMIN — ACETAMINOPHEN 1000 MG: 500 TABLET ORAL at 07:52

## 2019-08-27 RX ADMIN — IBUPROFEN 600 MG: 600 TABLET ORAL at 13:55

## 2019-08-27 RX ADMIN — ACETAMINOPHEN 1000 MG: 500 TABLET ORAL at 01:16

## 2019-08-27 RX ADMIN — IBUPROFEN 600 MG: 600 TABLET ORAL at 04:01

## 2019-08-27 NOTE — PROGRESS NOTES
POST OP DAY # 2  The patient says that other than for abdominal soreness she feels fine and has no problems or complaints. She says that she is ambulating and urinating and tolerating a regular diet and passing flatus.   The patient's vital signs are stable and she is afebrile.   She appears well developed and well nourished and relaxed and alert and comfortable and in no apparent distress.   Will plan on discharge home tomorrow.   Regino Cook M.D.

## 2019-08-27 NOTE — PROGRESS NOTES
Post op day # 3  The patient says that she has some soreness.   She says that she feels fine otherwise and she says that she has no other problems or complaints. She says that she would like to go home today.   The patient's vital signs are stable and she is afebrile.   She appears well developed and well nourished and relaxed and alert and comfortable and in no apparent distress.   Will discharge home today.   Follow up in office in two weeks for a post op visit.   Regino Cook M.D.

## 2019-08-27 NOTE — DISCHARGE PLANNING
Discharge Planning Assessment Post Partum    Reason for Referral: History of anxiety and MOB is under an alias.  Address: [address] [place] [address] [place] [address]  Phone: [phone]  Type of Living Situation: living with children and mother  Mom Diagnosis: Pregnancy  Baby Diagnosis:   Primary Language: English    Name of Baby: Myron Rader (: 19)  Father of the Baby: Not involved, did not want to provide  Involved in baby’s care? No  Contact Information: N/A    Prenatal Care: Yes  Mom's PCP: None  PCP for new baby: Pediatrician list provided    Support System: MOB's family  Coping/Bonding between mother & baby: Yes  Source of Feeding: breast and bottle feeding  Supplies for Infant: prepared for infant    Mom's Insurance: Medicaid FFS  Baby Covered on Insurance:Yes  Mother Employed/School: Not currently  Other children in the home/names & ages: Three girls; ages 11, 10, and 4    Financial Hardship/Income: denies   Mom's Mental status: alert and oriented  Services used prior to admit: Medicaid and WIC    CPS History: denies  Psychiatric History: history of anxiety, however MOB denies ever being diagnosed with anxiety.  Domestic Violence History: denies.  States she and the FOB do not get along but denies having any safety issues.  States they finally went to court and they have 50/50 custody of their children which has helped to have the courts involved.  Drug/ETOH History: denies    Resources Provided: pediatrician list, children and family resource list, and counseling resources specializing in post partum depression  Referrals Made: diaper bank referral provided     Clearance for Discharge: Infant is cleared to discharge home with MOB.

## 2019-08-27 NOTE — PROGRESS NOTES
1900-- Received report from SINTIA Eckert, Infant at bedside in open crib no signs of distress.  Pt resting in bed. Discussed pain management for the day. Pt requesting PRN pain medication to be brought when available.  No further needs at the time.  Call light within reach, bed locked and in lowest position.  Rounding in place.  1930-- Assessment completed, VSS. Pt would like pain medication to be brought in throughout the night when available.  Next pain medication due in about an hour, pt state she could wait.  Discussed plan of care for the night that pt is comfortable with.  All questions answered at this time.  Will continue to monitor.

## 2019-08-27 NOTE — DISCHARGE INSTRUCTIONS
POSTPARTUM DISCHARGE INSTRUCTIONS FOR MOM    YOB: 1985   Age: 33 y.o.               Admit Date: 2019     Discharge Date: 2019  Attending Doctor:  Regino Cook M.D.                  Allergies:  Patient has no known allergies.    Discharged to home by car. Discharged via wheelchair, hospital escort: Yes.  Special equipment needed: Not Applicable  Belongings with: Personal  Be sure to schedule a follow-up appointment with your primary care doctor or any specialists as instructed.     Discharge Plan:   Diet Plan: Discussed  Activity Level: Discussed  Confirmed Follow up Appointment: Patient to Call and Schedule Appointment  Confirmed Symptoms Management: Discussed  Medication Reconciliation Updated: Yes  Influenza Vaccine Indication: Indicated: Not available from distributor/    REASONS TO CALL YOUR OBSTETRICIAN:  1.   Persistent fever or shaking chills (Temperature higher than 100.4)  2.   Heavy bleeding (soaking more than 1 pad per hour); Passing clots  3.   Foul odor from vagina  4.   Mastitis (Breast infection; breast pain, chills, fever, redness)  5.   Urinary pain, burning or frequency  6.   Episiotomy infection  7.   Abdominal incision infection  8.   Severe depression longer than 24 hours    HAND WASHING  · Prior to handling the baby.  · Before breastfeeding or bottle feeding baby.  · After using the bathroom or changing the baby's diaper.    WOUND CARE  Ask your physician for additional care instructions.  In general:    ·  Incision:      · Keep clean and dry.    · Do NOT lift anything heavier than your baby for up to 6 weeks.    · There should not be any opening or pus.      VAGINAL CARE  · Nothing inside vagina for 6 weeks: no sexual intercourse, tampons or douching.  · Bleeding may continue for 2-4 weeks.  Amount may vary.    · Call your physician for heavy bleeding which means soaking more than 1 pad per hour    BIRTH CONTROL  · It is possible to become pregnant  "at any time after delivery and while breastfeeding.  · Plan to discuss a method of birth control with your physician at your follow up visit. visit.    DIET AND ELIMINATION  · Eating more fiber (bran cereal, fruits, and vegetables) and drinking plenty of fluids will help to avoid constipation.  · Urinary frequency after childbirth is normal.    POSTPARTUM BLUES  During the first few days after birth, you may experience a sense of the \"blues\" which may include impatience, irritability or even crying.  These feeling come and go quickly.  However, as many as 1 in 10 women experience emotional symptoms known as postpartum depression.    Postpartum depression:  May start as early as the second or third day after delivery or take several weeks or months to develop.  Symptoms of \"blues\" are present, but are more intense:  Crying spells; loss of appetite; feelings of hopelessness or loss of control; fear of touching the baby; over concern or no concern at all about the baby; little or no concern about your own appearance/caring for yourself; and/or inability to sleep or excessive sleeping.  Contact your physician if you are experiencing any of these symptoms.    Crisis Hotline:  · [place] Crisis Hotline:  2-803-ODQOUMC  Or 1-146.468.4744  · [place] Crisis Hotline:  1-296.259.2122  Or 168-374-1014    PREVENTING SHAKEN BABY:  If you are angry or stressed, PUT THE BABY IN THE CRIB, step away, take some deep breaths, and wait until you are calm to care for the baby.  DO NOT SHAKE THE BABY.  You are not alone, call a supporter for help.    · Crisis Call Center 24/7 crisis line 851-829-2392 or 1-831.463.7205  · You can also text them, text \"ANSWER\" to 672874    QUIT SMOKING/TOBACCO USE:  I understand the use of any tobacco products increases my chance of suffering from future heart disease and could cause other illnesses which may shorten my life. Quitting the use of tobacco products is the single most important thing I can do to " improve my health. For further information on smoking / tobacco cessation call a Toll Free Quit Line at 1-285.523.1921 (*[employer] [place]) or 1-280.107.5511 (American Lung Association) or you can access the web based program at www.lungusa.org.    · [place] Tobacco Users Help Line:  (947) 111-3291       Toll Free: 1-979.653.1758  · Quit Tobacco Program [facility] (597)347-2179    DEPRESSION / SUICIDE RISK:  As you are discharged from this [facility], it is important to learn how to keep safe from harming yourself.    Recognize the warning signs:  · Abrupt changes in personality, positive or negative- including increase in energy   · Giving away possessions  · Change in eating patterns- significant weight changes-  positive or negative  · Change in sleeping patterns- unable to sleep or sleeping all the time   · Unwillingness or inability to communicate  · Depression  · Unusual sadness, discouragement and loneliness  · Talk of wanting to die  · Neglect of personal appearance   · Rebelliousness- reckless behavior  · Withdrawal from people/activities they love  · Confusion- inability to concentrate     If you or a loved one observes any of these behaviors or has concerns about self-harm, here's what you can do:  · Talk about it- your feelings and reasons for harming yourself  · Remove any means that you might use to hurt yourself (examples: pills, rope, extension cords, firearm)  · Get professional help from the community (Mental Health, Substance Abuse, psychological counseling)  · Do not be alone:Call your Safe Contact- someone whom you trust who will be there for you.  · Call your local CRISIS HOTLINE 445-2519 or 780-237-8739  · Call your local Children's Mobile Crisis Response Team [place] (559) 120-1959 or www.[employer]  · Call the toll free National Suicide Prevention Hotlines   · National Suicide Prevention Lifeline 467-241-COSW (4004)  · National Hope Line  [facility] 800-SUICIDE (220-1198)    DISCHARGE SURVEY:  Thank you for choosing [facility].  We hope we provided you with very good care.  You may be receiving a survey in the mail.  Please fill it out.  Your opinion is valuable to us.    ADDITIONAL EDUCATIONAL MATERIALS GIVEN TO PATIENT:        My signature on this form indicates that:  1.  I have reviewed and understand the above information  2.  My questions regarding this information have been answered to my satisfaction.  3.  I have formulated a plan with my discharge nurse to obtain my prescribed medication for home.

## 2019-08-27 NOTE — PROGRESS NOTES
Post partum teaching complete.Patient claims all questions have been answered. Denies problems. Follow up instructions given.

## 2019-08-27 NOTE — PROGRESS NOTES
Bonding well w/ baby. Mehrdad po's, voiding w/out problems. Incision well approximated. Ambulating w/out problems. PO pain meds effective for pain control. SW cleared pt for discharge.

## 2019-08-27 NOTE — CARE PLAN
Problem: Safety  Goal: Will remain free from injury  8/26/2019 2118 by Jojo Martines R.N.  Outcome: PROGRESSING AS EXPECTED  Note:   Pt re-educated about use of call light if any assistance is needed. Pt. Is able to ambulate without any assistance at this time.   8/26/2019 2118 by Jojo Martines R.N.  Outcome: PROGRESSING AS EXPECTED  Note:   Pt re-educated about use of call light if any assistance is needed. Pt. Is able to ambulate without any assistance at this time.      Problem: Infection  Goal: Will remain free from infection  Outcome: PROGRESSING AS EXPECTED  Note:   Pt remains afebrile at this time. Pt. Shows no other s/s of infection.

## 2019-08-27 NOTE — LACTATION NOTE
This note was copied from a baby's chart.  Mother prefers to supplement her breast fed infant with formula, has done this with past babies. She will wean supplements once her milk production increases. Education provided.Support groups discussed. She has a [facility] appointment scheduled.

## 2019-09-16 ENCOUNTER — APPOINTMENT (OUTPATIENT)
Dept: RADIOLOGY | Facility: MEDICAL CENTER | Age: 34
End: 2019-09-16
Attending: EMERGENCY MEDICINE
Payer: MEDICAID

## 2019-09-16 ENCOUNTER — HOSPITAL ENCOUNTER (EMERGENCY)
Facility: MEDICAL CENTER | Age: 34
End: 2019-09-16
Attending: EMERGENCY MEDICINE
Payer: MEDICAID

## 2019-09-16 VITALS
DIASTOLIC BLOOD PRESSURE: 71 MMHG | SYSTOLIC BLOOD PRESSURE: 104 MMHG | RESPIRATION RATE: 18 BRPM | TEMPERATURE: 99 F | HEART RATE: 80 BPM | WEIGHT: 191.36 LBS | HEIGHT: 64 IN | OXYGEN SATURATION: 99 % | BODY MASS INDEX: 32.67 KG/M2

## 2019-09-16 DIAGNOSIS — A41.9 SEPSIS, DUE TO UNSPECIFIED ORGANISM: ICD-10-CM

## 2019-09-16 DIAGNOSIS — G89.18 POST-OPERATIVE PAIN: ICD-10-CM

## 2019-09-16 DIAGNOSIS — N30.00 ACUTE CYSTITIS WITHOUT HEMATURIA: ICD-10-CM

## 2019-09-16 DIAGNOSIS — R10.2 PELVIC PAIN: ICD-10-CM

## 2019-09-16 LAB
ALBUMIN SERPL BCP-MCNC: 3.4 G/DL (ref 3.2–4.9)
ALBUMIN/GLOB SERPL: 1 G/DL
ALP SERPL-CCNC: 95 U/L (ref 30–99)
ALT SERPL-CCNC: 14 U/L (ref 2–50)
ANION GAP SERPL CALC-SCNC: 9 MMOL/L (ref 0–11.9)
APPEARANCE UR: ABNORMAL
AST SERPL-CCNC: 15 U/L (ref 12–45)
BACTERIA #/AREA URNS HPF: ABNORMAL /HPF
BASOPHILS # BLD AUTO: 0.2 % (ref 0–1.8)
BASOPHILS # BLD: 0.03 K/UL (ref 0–0.12)
BILIRUB SERPL-MCNC: 0.7 MG/DL (ref 0.1–1.5)
BILIRUB UR QL STRIP.AUTO: NEGATIVE
BLOOD CULTURE HOLD CXBCH: NORMAL
BUN SERPL-MCNC: 11 MG/DL (ref 8–22)
CALCIUM SERPL-MCNC: 8.1 MG/DL (ref 8.5–10.5)
CHLORIDE SERPL-SCNC: 107 MMOL/L (ref 96–112)
CO2 SERPL-SCNC: 21 MMOL/L (ref 20–33)
COLOR UR: YELLOW
CREAT SERPL-MCNC: 0.61 MG/DL (ref 0.5–1.4)
EOSINOPHIL # BLD AUTO: 0.08 K/UL (ref 0–0.51)
EOSINOPHIL NFR BLD: 0.5 % (ref 0–6.9)
EPI CELLS #/AREA URNS HPF: ABNORMAL /HPF
ERYTHROCYTE [DISTWIDTH] IN BLOOD BY AUTOMATED COUNT: 49.9 FL (ref 35.9–50)
GLOBULIN SER CALC-MCNC: 3.4 G/DL (ref 1.9–3.5)
GLUCOSE SERPL-MCNC: 103 MG/DL (ref 65–99)
GLUCOSE UR STRIP.AUTO-MCNC: NEGATIVE MG/DL
HCT VFR BLD AUTO: 34.1 % (ref 37–47)
HGB BLD-MCNC: 10.9 G/DL (ref 12–16)
HYALINE CASTS #/AREA URNS LPF: ABNORMAL /LPF
IMM GRANULOCYTES # BLD AUTO: 0.08 K/UL (ref 0–0.11)
IMM GRANULOCYTES NFR BLD AUTO: 0.5 % (ref 0–0.9)
KETONES UR STRIP.AUTO-MCNC: NEGATIVE MG/DL
LEUKOCYTE ESTERASE UR QL STRIP.AUTO: ABNORMAL
LIPASE SERPL-CCNC: 8 U/L (ref 11–82)
LYMPHOCYTES # BLD AUTO: 1.78 K/UL (ref 1–4.8)
LYMPHOCYTES NFR BLD: 11.8 % (ref 22–41)
MCH RBC QN AUTO: 27.6 PG (ref 27–33)
MCHC RBC AUTO-ENTMCNC: 32 G/DL (ref 33.6–35)
MCV RBC AUTO: 86.3 FL (ref 81.4–97.8)
MICRO URNS: ABNORMAL
MONOCYTES # BLD AUTO: 0.92 K/UL (ref 0–0.85)
MONOCYTES NFR BLD AUTO: 6.1 % (ref 0–13.4)
NEUTROPHILS # BLD AUTO: 12.18 K/UL (ref 2–7.15)
NEUTROPHILS NFR BLD: 80.9 % (ref 44–72)
NITRITE UR QL STRIP.AUTO: POSITIVE
NRBC # BLD AUTO: 0 K/UL
NRBC BLD-RTO: 0 /100 WBC
PH UR STRIP.AUTO: 6 [PH] (ref 5–8)
PLATELET # BLD AUTO: 234 K/UL (ref 164–446)
PMV BLD AUTO: 9.7 FL (ref 9–12.9)
POTASSIUM SERPL-SCNC: 3.2 MMOL/L (ref 3.6–5.5)
PROT SERPL-MCNC: 6.8 G/DL (ref 6–8.2)
PROT UR QL STRIP: 30 MG/DL
RBC # BLD AUTO: 3.95 M/UL (ref 4.2–5.4)
RBC # URNS HPF: ABNORMAL /HPF
RBC UR QL AUTO: ABNORMAL
SODIUM SERPL-SCNC: 137 MMOL/L (ref 135–145)
SP GR UR REFRACTOMETRY: NORMAL
UROBILINOGEN UR STRIP.AUTO-MCNC: 1 MG/DL
WBC # BLD AUTO: 15.1 K/UL (ref 4.8–10.8)
WBC #/AREA URNS HPF: ABNORMAL /HPF

## 2019-09-16 PROCEDURE — 36415 COLL VENOUS BLD VENIPUNCTURE: CPT

## 2019-09-16 PROCEDURE — 700105 HCHG RX REV CODE 258: Performed by: EMERGENCY MEDICINE

## 2019-09-16 PROCEDURE — 87077 CULTURE AEROBIC IDENTIFY: CPT

## 2019-09-16 PROCEDURE — 87186 SC STD MICRODIL/AGAR DIL: CPT

## 2019-09-16 PROCEDURE — 85025 COMPLETE CBC W/AUTO DIFF WBC: CPT

## 2019-09-16 PROCEDURE — 87086 URINE CULTURE/COLONY COUNT: CPT

## 2019-09-16 PROCEDURE — 700102 HCHG RX REV CODE 250 W/ 637 OVERRIDE(OP): Performed by: EMERGENCY MEDICINE

## 2019-09-16 PROCEDURE — A9270 NON-COVERED ITEM OR SERVICE: HCPCS | Performed by: EMERGENCY MEDICINE

## 2019-09-16 PROCEDURE — 99285 EMERGENCY DEPT VISIT HI MDM: CPT

## 2019-09-16 PROCEDURE — 83690 ASSAY OF LIPASE: CPT

## 2019-09-16 PROCEDURE — 80053 COMPREHEN METABOLIC PANEL: CPT

## 2019-09-16 PROCEDURE — 76856 US EXAM PELVIC COMPLETE: CPT

## 2019-09-16 PROCEDURE — 81001 URINALYSIS AUTO W/SCOPE: CPT

## 2019-09-16 RX ORDER — SULFAMETHOXAZOLE AND TRIMETHOPRIM 800; 160 MG/1; MG/1
1 TABLET ORAL ONCE
Status: COMPLETED | OUTPATIENT
Start: 2019-09-16 | End: 2019-09-16

## 2019-09-16 RX ORDER — SULFAMETHOXAZOLE AND TRIMETHOPRIM 800; 160 MG/1; MG/1
1 TABLET ORAL 2 TIMES DAILY
Qty: 10 TAB | Refills: 0 | Status: SHIPPED | OUTPATIENT
Start: 2019-09-16 | End: 2019-09-21

## 2019-09-16 RX ORDER — SODIUM CHLORIDE 9 MG/ML
1000 INJECTION, SOLUTION INTRAVENOUS ONCE
Status: COMPLETED | OUTPATIENT
Start: 2019-09-16 | End: 2019-09-16

## 2019-09-16 RX ADMIN — SULFAMETHOXAZOLE AND TRIMETHOPRIM 1 TABLET: 800; 160 TABLET ORAL at 14:04

## 2019-09-16 RX ADMIN — SODIUM CHLORIDE 1000 ML: 9 INJECTION, SOLUTION INTRAVENOUS at 12:23

## 2019-09-16 NOTE — LETTER
9/21/2019               Angelina Rader   [address]  [place] [address]        Dear Angelina (MR#[id number])    This letter is sent in regards to your, recent visit to the [facility] Emergency Department on 9/16/2019.  During the visit, tests were performed to assist the physician in a medical diagnosis.  A review of those tests requires that we notify you of the following:    Your urine culture was POSITIVE for a bacteria called Escherichia coli. The antibiotic prescribed for you (sulfamethoxazole-trimethoprim) should be active to treat this bacteria. IT IS IMPORTANT THAT YOU CONTINUE TAKING YOUR ANTIBIOTIC UNTIL IT IS FINISHED.      Please feel free to contact me at the number below if you have any questions or concerns. Thank you for your cooperation in the matter.    Sincerely,  ED Culture Follow-Up Staff  Calvin Carlson, PharmD    [facility], Emergency Department  [address] [place] [id number]  [phone] (ED Culture Line)  [phone]

## 2019-09-16 NOTE — ED NOTES
Discharge instructions and prescriptions discussed with pt. Pt verbalized understanding. Discharged ambulatory.

## 2019-09-16 NOTE — ED PROVIDER NOTES
ED Provider Note    CHIEF COMPLAINT  Chief Complaint   Patient presents with   • Fever     intermittent for 3 days   • Post-Op Complications     post         HPI  Angelina Rader is a 33 y.o. female who presents for evaluation of pelvic pain with intermittent vaginal bleeding and subjective fever over the past 2 to 3 days, 3 weeks status post a .  Her OB/GYN physician is Dr. Joey Lacy.  She had a postop appointment scheduled with him that has been rescheduled multiple times as she has not been evaluated since being discharged from the hospital.  She has not taken her temperature at home but feels febrile as well as experiencing chills.  No dysuria or hematuria.  Intermittent vaginal bleeding since being discharged from the hospital.  She states that the pain in her pelvis seems to radiate towards her back.  She is had no shortness of breath or coughing, no vomiting and no diarrhea.  She does note when she has bowel movement her pelvic pain seems to increase.  She has not remove the surgical dressing from her abdomen.  She offers no other specific complaints at this time.    REVIEW OF SYSTEMS  Negative for rash, chest pain, dyspnea, vomiting, diarrhea, headache, focal weakness, focal numbness, focal tingling. All other systems are negative.     PAST MEDICAL HISTORY  Past Medical History:   Diagnosis Date   • Psychiatric problem     Anxiety   • Urinary tract infection, site not specified        FAMILY HISTORY  No family history on file.    SOCIAL HISTORY  Social History     Tobacco Use   • Smoking status: Former Smoker     Years: 9.00     Last attempt to quit: 2014     Years since quittin.7   • Smokeless tobacco: Never Used   • Tobacco comment: Pt. states quit when she found out she was pregnant.    Substance Use Topics   • Alcohol use: No   • Drug use: No       SURGICAL HISTORY  Past Surgical History:   Procedure Laterality Date   • REPEAT C SECTION N/A 2019    Procedure:  " SECTION, REPEAT;  Surgeon: Regino Cook M.D.;  Location: LABOR AND DELIVERY;  Service: Labor and Delivery   • PRIMARY C SECTION  2015    Procedure: PRIMARY C SECTION;  Surgeon: Regino Cook M.D.;  Location: LABOR AND DELIVERY;  Service:    • ABDOMINAL EXPLORATION     • DILATION AND CURETTAGE      due to sab   • CHOLECYSTECTOMY         CURRENT MEDICATIONS  I personally reviewed the medication list in the charting documentation.     ALLERGIES  No Known Allergies    MEDICAL RECORD  I have reviewed patient's medical record and pertinent results are listed above.      PHYSICAL EXAM  VITAL SIGNS: /71   Pulse (!) 104   Temp 37.2 °C (99 °F) (Temporal)   Resp 18   Ht 1.626 m (5' 4\")   Wt 86.8 kg (191 lb 5.8 oz)   SpO2 97%   BMI 32.85 kg/m²    Constitutional: Appears to be mildly ill  HENT: Mucus membranes moist.    Eyes: No scleral icterus. Normal conjunctiva   Neck: Supple, comfortable, nonpainful range of motion.   Cardiovascular: Minimal tachycardia, regular  Thorax & Lungs: Chest is nontender.  Lungs are clear to auscultation with good air movement bilaterally.  No wheeze, rhonchi, nor rales.   Abdomen: Soft, nondistended.  Low transverse incision across the abdomen is intact, no surrounding erythema whatsoever, generalized tenderness of the entire low abdomen but no distention, no rebound and no guarding.  Skin: Warm, dry. No rash appreciated  Extremities/Musculoskeletal: No sign of trauma. No asymmetric calf tenderness, erythema or edema. Normal range of motion   Neurologic: Alert & oriented. No focal deficits observed.   Psychiatric: Normal affect appropriate for the clinical situation.    DIAGNOSTIC STUDIES / PROCEDURES    LABS/EKGs  Results for orders placed or performed during the hospital encounter of 19   URINALYSIS CULTURE, IF INDICATED   Result Value Ref Range    Color Yellow     Character Cloudy (A)     Ph 6.0 5.0 - 8.0    Glucose Negative Negative mg/dL    " Ketones Negative Negative mg/dL    Protein 30 (A) Negative mg/dL    Bilirubin Negative Negative    Urobilinogen, Urine 1.0 Negative    Nitrite Positive (A) Negative    Leukocyte Esterase Large (A) Negative    Occult Blood Moderate (A) Negative    Micro Urine Req Microscopic    REFRACTOMETER SG   Result Value Ref Range    Specific Gravity See comment    URINE MICROSCOPIC (W/UA)   Result Value Ref Range    WBC Packed (A) /hpf    RBC 5-10 (A) /hpf    Bacteria Many (A) None /hpf    Epithelial Cells Few /hpf    Hyaline Cast 0-2 /lpf   CBC WITH DIFFERENTIAL   Result Value Ref Range    WBC 15.1 (H) 4.8 - 10.8 K/uL    RBC 3.95 (L) 4.20 - 5.40 M/uL    Hemoglobin 10.9 (L) 12.0 - 16.0 g/dL    Hematocrit 34.1 (L) 37.0 - 47.0 %    MCV 86.3 81.4 - 97.8 fL    MCH 27.6 27.0 - 33.0 pg    MCHC 32.0 (L) 33.6 - 35.0 g/dL    RDW 49.9 35.9 - 50.0 fL    Platelet Count 234 164 - 446 K/uL    MPV 9.7 9.0 - 12.9 fL    Neutrophils-Polys 80.90 (H) 44.00 - 72.00 %    Lymphocytes 11.80 (L) 22.00 - 41.00 %    Monocytes 6.10 0.00 - 13.40 %    Eosinophils 0.50 0.00 - 6.90 %    Basophils 0.20 0.00 - 1.80 %    Immature Granulocytes 0.50 0.00 - 0.90 %    Nucleated RBC 0.00 /100 WBC    Neutrophils (Absolute) 12.18 (H) 2.00 - 7.15 K/uL    Lymphs (Absolute) 1.78 1.00 - 4.80 K/uL    Monos (Absolute) 0.92 (H) 0.00 - 0.85 K/uL    Eos (Absolute) 0.08 0.00 - 0.51 K/uL    Baso (Absolute) 0.03 0.00 - 0.12 K/uL    Immature Granulocytes (abs) 0.08 0.00 - 0.11 K/uL    NRBC (Absolute) 0.00 K/uL   COMP METABOLIC PANEL   Result Value Ref Range    Sodium 137 135 - 145 mmol/L    Potassium 3.2 (L) 3.6 - 5.5 mmol/L    Chloride 107 96 - 112 mmol/L    Co2 21 20 - 33 mmol/L    Anion Gap 9.0 0.0 - 11.9    Glucose 103 (H) 65 - 99 mg/dL    Bun 11 8 - 22 mg/dL    Creatinine 0.61 0.50 - 1.40 mg/dL    Calcium 8.1 (L) 8.5 - 10.5 mg/dL    AST(SGOT) 15 12 - 45 U/L    ALT(SGPT) 14 2 - 50 U/L    Alkaline Phosphatase 95 30 - 99 U/L    Total Bilirubin 0.7 0.1 - 1.5 mg/dL    Albumin 3.4  3.2 - 4.9 g/dL    Total Protein 6.8 6.0 - 8.2 g/dL    Globulin 3.4 1.9 - 3.5 g/dL    A-G Ratio 1.0 g/dL   LIPASE   Result Value Ref Range    Lipase 8 (L) 11 - 82 U/L   Blood Culture,Hold   Result Value Ref Range    Blood Culture Hold Collected    ESTIMATED GFR   Result Value Ref Range    GFR If African American >60 >60 mL/min/1.73 m 2    GFR If Non African American >60 >60 mL/min/1.73 m 2        RADIOLOGY  US-PELVIC COMPLETE (TRANSABDOMINAL/TRANSVAGINAL) (COMBO)   Final Result      1.  Complex fluid within the endometrial cavity possibly representing retained products of conception.      2.  No evidence of adnexal mass.            COURSE & MEDICAL DECISION MAKING  I have reviewed any medical record information, laboratory studies and radiographic results as noted above.  Differential diagnoses includes: Sepsis, UTI, retained products, dehydration, electrolyte abnormalities, anemia    Encounter Summary: This is a 33 y.o. female with subjective fever, pelvic pain and some vaginal bleeding 3 weeks status post a , her incision looks great without any evidence of infection, her abdomen is tender overlying her pelvis but no rebound or guarding, she is tachycardic on exam, she is not febrile here in the emergency department but states that she is been taking Tylenol at home.  Will obtain ultrasound the pelvis, urinalysis as well as blood work, medicate with IV fluids given her tachycardia and reevaluate ------ ultrasound reveals a complex fluid within the endometrial cavity which could represent retained products however very unlikely because this was a , I did discuss the case with Dr. Lacy who states that based on his technique of performing a  it would be exceedingly unlikely for this to be any retained products.  Her evaluation is also significant for a nitrate positive urinary tract infection which I think is the etiology of her symptoms.  She does have an elevated WBC and meets criteria  for sepsis although she is well-appearing and I think will be well suited for outpatient management oral antibiotics, her first dose is given here in the emergency department.  She will follow-up with Dr. Lacy, at this time she is stable and appropriate for discharge with strict return instructions discussed.    HYDRATION: Based on the patient's presentation of Tachycardia the patient was given IV fluids. IV Hydration was used because oral hydration was not adequate alone. Upon recheck following hydration, the patient was Improving.        DISPOSITION: Discharged home in stable condition      FINAL IMPRESSION  1. Acute cystitis without hematuria    2. Pelvic pain    3. Post-operative pain    4. Sepsis, due to unspecified organism (HCC)           This dictation was created using voice recognition software. The accuracy of the dictation is limited to the abilities of the software. I expect there may be some errors of grammar and possibly content. The nursing notes were reviewed and certain aspects of this information were incorporated into this note.    Electronically signed by: Fracisco Kaur, 9/16/2019 12:08 PM

## 2019-09-16 NOTE — ED TRIAGE NOTES
Chief Complaint   Patient presents with   • Fever     intermittent for 3 days   • Post-Op Complications     post       Pt denies other sx.  States continued vaginal bleeding and incision site pain

## 2019-09-21 NOTE — ED NOTES
"ED Positive Culture Follow-up/Notification Note:    Date: 19     Patient seen in the ED on 2019 for pelvic pain, intermittent vaginal bleeding, and subjective fever x 2-3 days. Pt gave birth via  on 19. Pt denied any vomiting, dysuria and/or hematuria.     1. Acute cystitis without hematuria    2. Pelvic pain    3. Post-operative pain    4. Sepsis, due to unspecified organism (HCC)       Discharge Medication List as of 2019  2:25 PM      START taking these medications    Details   sulfamethoxazole-trimethoprim (BACTRIM DS) 800-160 MG tablet Take 1 Tab by mouth 2 times a day for 5 days., Disp-10 Tab, R-0, Print Rx Paper           Medications given in the ED:  -NS 1L IV   -Bactrim DS PO once    Allergies: Patient has no known allergies.     Vitals:    19 1036 19 1038 19 1100 19 1400   BP: 114/69  104/71    Pulse: (!) 111  (!) 104 80   Resp: 16  18    Temp: 37.3 °C (99.1 °F)  37.2 °C (99 °F)    TempSrc: Oral  Temporal    SpO2: 98%  97% 99%   Weight:  86.8 kg (191 lb 5.8 oz)     Height: 1.626 m (5' 4\")          Final cultures:   Results     Procedure Component Value Units Date/Time    URINE CULTURE(NEW) [[id number]]  (Abnormal)  (Susceptibility) Collected:  19 1122    Order Status:  Completed Specimen:  Urine Updated:  19 0759     Significant Indicator POS     Source UR     Site -     Culture Result -      Escherichia coli  >100,000 cfu/mL      Susceptibility     Escherichia coli (1)     Antibiotic Interpretation Microscan Method Status    Ampicillin Sensitive <=8 mcg/mL BAILEY Final    Ceftriaxone Sensitive <=1 mcg/mL BAILEY Final    Ceftazidime Sensitive <=1 mcg/mL BAILEY Final    Cefotaxime Sensitive <=2 mcg/mL BAILEY Final    Cefazolin Sensitive <=2 mcg/mL BAILEY Final    Ciprofloxacin Sensitive <=1 mcg/mL BAILEY Final    Ampicillin/sulbactam Sensitive <=8/4 mcg/mL BAILEY Final    Cefepime Sensitive <=2 mcg/mL BAILEY Final    Tobramycin Sensitive <=4 mcg/mL BAILEY Final    " Cefotetan Sensitive <=16 mcg/mL BAILEY Final    Nitrofurantoin Sensitive <=32 mcg/mL BAILEY Final    Gentamicin Sensitive <=4 mcg/mL BAILEY Final    Levofloxacin Sensitive <=2 mcg/mL BAILEY Final    Pip/Tazobactam Sensitive <=16 mcg/mL BAILEY Final    Trimeth/Sulfa Sensitive <=2/38 mcg/mL BAILEY Final                   URINALYSIS CULTURE, IF INDICATED [[id number]]  (Abnormal) Collected:  09/16/19 1122    Order Status:  Completed Specimen:  Urine, Clean Catch Updated:  09/16/19 1300     Color Yellow     Character Cloudy     Ph 6.0     Glucose Negative mg/dL      Ketones Negative mg/dL      Protein 30 mg/dL      Bilirubin Negative     Urobilinogen, Urine 1.0     Nitrite Positive     Leukocyte Esterase Large     Occult Blood Moderate     Micro Urine Req Microscopic    Blood Culture,Hold [[id number]] Collected:  09/16/19 1122    Order Status:  Completed Updated:  09/16/19 1227     Blood Culture Hold Collected          Plan:   Appropriate antibiotic therapy prescribed. No changes required based upon culture result.  Sent letter to patient to notify of positive culture result and encourage compliance with prescribed antibiotics.       Calvin Carlson, PharmD

## 2019-10-14 NOTE — DISCHARGE SUMMARY
DATE OF ADMISSION:  2019    DATE OF DISCHARGE:  2019    ADMISSION DIAGNOSES:  1.  Intrauterine pregnancy at 39 weeks and 3 days gestation.  2.  Previous  section.  The patient wishes to be delivered via repeat    section.    DISCHARGE DIAGNOSES:  1.  Intrauterine pregnancy at 39 weeks and 3 days gestation.  2.  Previous  section.  The patient wishes to be delivered via repeat    section.  3.  Live term male  with Apgar scores of 8 and 9 at 1 and 5 minutes   respectively and a  weight of 3,810 grams.    PROCEDURES:  Repeat low transverse  section.    COMPLICATIONS:  None.    HOSPITAL COURSE:  The patient was admitted to [facility]   labor and delivery on Saturday, 2019 with the aforementioned admission   diagnoses and on that day underwent a repeat low transverse  section   and was delivered of a live term male  with Apgar scores of 8 and 9 at   1 and 5 minutes respectively and a  weight of 3810 g.  The patient's   postoperative course was uncomplicated.  On 2019, postoperative day #2,   the patient said that other than for some abdominal soreness, she felt fine   and had no problems or complaints and said that she was ambulating and   urinating and tolerating a regular diet and passing flatus.  The next day, on   2019, postoperative day #3, the patient said that other than for some   soreness, she felt fine and has no problems or complaints and said she wished   to go home and so was sent home on that day with prescriptions for analgesics   and instructions to call or contact me at any time should she ever have any   problems or questions or complaints and she said that she would do so.  Of   note, her preoperative hemoglobin was 11.9 g/dL and her postoperative   hemoglobin was 11.3 g/dL.  I asked her to return to see me in the office in 2   weeks for postoperative visit and to call or  contact me at any time should she   ever have any problems or questions or complaints and she said that she would   do so.       ____________________________________     Regino Cook MD    MED / [facility]    DD:  10/13/2019 22:07:44  DT:  10/14/2019 05:11:27    D#:  [id number]  Job#:  [id number]

## 2019-12-27 ENCOUNTER — OCCUPATIONAL MEDICINE (OUTPATIENT)
Dept: URGENT CARE | Facility: CLINIC | Age: 34
End: 2019-12-27
Payer: COMMERCIAL

## 2019-12-27 VITALS
DIASTOLIC BLOOD PRESSURE: 70 MMHG | TEMPERATURE: 97.8 F | SYSTOLIC BLOOD PRESSURE: 118 MMHG | OXYGEN SATURATION: 97 % | HEIGHT: 65 IN | BODY MASS INDEX: 31.11 KG/M2 | RESPIRATION RATE: 16 BRPM | HEART RATE: 92 BPM | WEIGHT: 186.73 LBS

## 2019-12-27 DIAGNOSIS — S61.012D LACERATION OF LEFT THUMB WITHOUT FOREIGN BODY WITHOUT DAMAGE TO NAIL, SUBSEQUENT ENCOUNTER: ICD-10-CM

## 2019-12-27 DIAGNOSIS — S61.012A LACERATION OF LEFT THUMB WITHOUT FOREIGN BODY WITHOUT DAMAGE TO NAIL, INITIAL ENCOUNTER: ICD-10-CM

## 2019-12-27 PROCEDURE — 99203 OFFICE O/P NEW LOW 30 MIN: CPT | Performed by: FAMILY MEDICINE

## 2019-12-27 PROCEDURE — 99213 OFFICE O/P EST LOW 20 MIN: CPT | Performed by: FAMILY MEDICINE

## 2019-12-27 ASSESSMENT — ENCOUNTER SYMPTOMS
FEVER: 0
FOCAL WEAKNESS: 0
SENSORY CHANGE: 0

## 2019-12-27 NOTE — LETTER
"EMPLOYEE’S CLAIM FOR COMPENSATION/ REPORT OF INITIAL TREATMENT  FORM C-4    EMPLOYEE’S CLAIM - PROVIDE ALL INFORMATION REQUESTED   First Name  Angelina Last Name  Dior Birthdate                    1985                Sex  female Claim Number   Home Address  [address] Age  34 y.o. Height  1.651 m (5' 5\") Weight  84.7 kg (186 lb 11.7 oz) [facility]     City                                               [place]                         State  [place] Zip  [address] Telephone  [phone] (home)    Mailing Address  [address] [facility] Zip  [address] Primary Language Spoken  English    Insurer  [employer] [facility] Third Party   [employer] [facility]   Employee's Occupation (Job Title) When Injury or Occupational Disease Occurred      Employer's Name  [employer]  Telephone  [phone]    Employer Address  [address]  [place]  Zip  [address]    Date of Injury  12/27/2019               Hour of Injury  8:15 AM Date Employer Notified  12/27/2019 Last Day of Work after Injury or Occupational Disease  12/27/2019 Supervisor to Whom Injury Reported  Cr   Address or Location of Accident (if applicable)  [[id number]]   What were you doing at the time of accident? (if applicable)  Picking a item and glass fell    How did this injury or occupational disease occur? (Be specific an answer in detail. Use additional sheet if necessary)  I was going to catch the item of glass and shattered on my thumb got cut.   If you believe that you have an occupational disease, when did you first have knowledge of the disability and it relationship to your employment?  n/a Witnesses to the Accident  no witnesses      Nature of Injury or Occupational Disease  Workers' Compensation  Part(s) of Body Injured or Affected  Thumb (L), ,     I certify that the above is true and correct to the best of my knowledge and that I have provided this information in order to obtain the benefits of " [place] or [place] of [place]).  I hereby authorize any physician, chiropractor, surgeon, practitioner, or other person, any hospital, including [facility], any medical service organization, any insurance company, or other institution or organization to release to each other, any medical or other information, including benefits paid or payable, pertinent to this injury or disease, except information relative to diagnosis, treatment and/or counseling for AIDS, psychological conditions, alcohol or controlled substances, for which I must give specific authorization.  A Photostat of this authorization shall be as valid as the original.     Date 12/27/19   Place [facility]   Employee’s Signature   THIS REPORT MUST BE COMPLETED AND MAILED WITHIN 3 WORKING DAYS OF TREATMENT   Place  [facility]  Name of Facility  [facility]   Date  12/27/2019 Diagnosis  (S61.012A) Laceration of left thumb without foreign body without damage to nail, initial encounter Is there evidence the injured employee was under the influence of alcohol and/or another controlled substance at the time of accident?   Hour  8:49 AM Description of Injury or Disease  The encounter diagnosis was Laceration of left thumb without foreign body without damage to nail, initial encounter. No   Treatment  Wound closure  Have you advised the patient to remain off work five days or more? No   X-Ray Findings    Comments:n/a   If Yes   From Date  To Date      From information given by the employee, together with medical evidence, can you directly connect this injury or occupational disease as job incurred?  Yes If No Full Duty Yes Modified Duty      Is additional medical care by a physician indicated?  No If Modified Duty, Specify any Limitations / Restrictions      Do you know of any previous injury or disease contributing to  "this condition or occupational disease?                            No   Date  12/27/2019 Print Doctor’s Name Bernard Mott M.D. I certify the employer’s copy of  this form was mailed on:   Address  [address] [place] [address] Insurer’s Use Only     [facility] [place] [facility] Zip  [address]    Provider’s Tax ID Number  [id number] Telephone  Dept: [phone]        ciara-BERNARD Rich M.D.   e-Signature: Dr. Fabio Truong,   Medical Director Degree  MD        ORIGINAL-TREATING PHYSICIAN OR CHIROPRACTOR    PAGE 2-INSURER/TPA    PAGE 3-EMPLOYER    PAGE 4-EMPLOYEE             Form C-4 (rev.10/07)              BRIEF DESCRIPTION OF RIGHTS AND BENEFITS  (Pursuant to [place]RS 616C.050)    Notice of Injury or Occupational Disease (Incident Report Form C-1): If an injury or occupational disease (OD) arises out of and in the course of employment, you must provide written notice to your employer as soon as practicable, but no later than 7 days after the accident or OD. Your employer shall maintain a sufficient supply of the required forms.    Claim for Compensation (Form C-4): If medical treatment is sought, the form C-4 is available at the place of initial treatment. A completed \"Claim for Compensation\" (Form C-4) must be filed within 90 days after an accident or OD. The treating physician or chiropractor must, within 3 working days after treatment, complete and mail to the employer, the employer's insurer and third-party , the Claim for Compensation.    Medical Treatment: If you require medical treatment for your on-the-job injury or OD, you may be required to select a physician or chiropractor from a list provided by your workers’ compensation insurer, if it has contracted with an Organization for Managed Care (MCO) or Preferred Provider Organization (PPO) or providers of health care. If your employer has not entered into a contract with an MCO or PPO, you may select a physician or chiropractor from the Panel of " Physicians and Chiropractors. Any medical costs related to your industrial injury or OD will be paid by your insurer.    Temporary Total Disability (TTD): If your doctor has certified that you are unable to work for a period of at least 5 consecutive days, or 5 cumulative days in a 20-day period, or places restrictions on you that your employer does not accommodate, you may be entitled to TTD compensation.    Temporary Partial Disability (TPD): If the wage you receive upon reemployment is less than the compensation for TTD to which you are entitled, the insurer may be required to pay you TPD compensation to make up the difference. TPD can only be paid for a maximum of 24 months.    Permanent Partial Disability (PPD): When your medical condition is stable and there is an indication of a PPD as a result of your injury or OD, within 30 days, your insurer must arrange for an evaluation by a rating physician or chiropractor to determine the degree of your PPD. The amount of your PPD award depends on the date of injury, the results of the PPD evaluation and your age and wage.    Permanent Total Disability (PTD): If you are medically certified by a treating physician or chiropractor as permanently and totally disabled and have been granted a PTD status by your insurer, you are entitled to receive monthly benefits not to exceed 66 2/3% of your average monthly wage. The amount of your PTD payments is subject to reduction if you previously received a PPD award.    Vocational Rehabilitation Services: You may be eligible for vocational rehabilitation services if you are unable to return to the job due to a permanent physical impairment or permanent restrictions as a result of your injury or occupational disease.    Transportation and Per Cuco Reimbursement: You may be eligible for travel expenses and per cuco associated with medical treatment.    Reopening: You may be able to reopen your claim if your condition worsens after  claim closure.    Appeal Process: If you disagree with a written determination issued by the insurer or the insurer does not respond to your request, you may appeal to the Department of Administration, , by following the instructions contained in your determination letter. You must appeal the determination within 70 days from the date of the determination letter at [address], [address], [place], [place] [address], or [address], [place], [place] [address]. If you disagree with the  decision, you may appeal to the Department of Administration, . You must file your appeal within 30 days from the date of the  decision letter at [address], [address], [place], [place] [address], or [address], [place], [place] [address]. If you disagree with a decision of an , you may file a petition for judicial review with the District Court. You must do so within 30 days of the Appeal Officer’s decision. You may be represented by an  at your own expense or you may contact the [facility] for possible representation.    [place]  for Injured Workers (NAIW): If you disagree with a  decision, you may request that NAIW represent you without charge at an  Hearing. For information regarding denial of benefits, you may contact the [facility] at: [address], [address], [place] [address], [phone]) [phone], or [address], [place] [address], [phone]    To File a Complaint with the Division: If you wish to file a complaint with the  of the Division of Industrial Relations (DIR),  please contact the Workers’ Compensation Section, [address], [place], [place] [address], telephone (310) [phone], or [address], [place], [place] [address], telephone [phone]) [phone].    For assistance with  Workers’ Compensation Issues: You may contact the Office of the Governor Consumer Health Assistance, [address] [place] [address], [address], [place] [address], Toll Free 1-646.927.2591, Web site: http://govcha.[facility].nv., E-mail [email or web address].[email or web address].                   __________________________________________________________________                                                     ____12/27/19_____        Employee Name / Signature                                                                                                                                              Date                                                                                                                                                                                                     D-2 (rev. 06/18)

## 2019-12-27 NOTE — LETTER
[facility]  [address] [place] [address]  Phone:  [phone] - Fax:  [phone]   [facility] Progress Report and Disability Certification  Date of Service: 12/27/2019   No Show:  No  Date / Time of Next Visit: 12/30/2019   Claim Information   Patient Name: Angelina Rader  Claim Number:     Employer: [employer]  Date of Injury: 12/27/2019     Insurer / TPA: Roman [employer] [facility]  ID / SSN:     Occupation:   Diagnosis: The encounter diagnosis was Laceration of left thumb without foreign body without damage to nail, subsequent encounter.    Medical Information   Related to Industrial Injury? Yes    Subjective Complaints:  Date of injury 12/27/2019: 34-year-old right-hand-dominant  presents with same day follow-up evaluation for left thumb laceration sustained earlier this morning.  The left thumb wound was initially closed with glue adhesive.  The patient returned to work for a trial of regular duty and unfortunately experienced bleeding in the left thumb when attempting to perform the activities of a .  The patient denies specific second injury.  Patient states the thumb initially bled copiously yet has resolved at this time after applying direct pressure.   Objective Findings: Left thumb palmar tip: Closed, well approximated 1 cm linear laceration visualized with no active bleeding, no foreign body, neurovascularly intact, the tissue adhesive remains present with a flap on the proximal quarter aspect of the wound.   Pre-Existing Condition(s):     Assessment:   Condition Worsened    Status: Additional Care Required  Permanent Disability:No    Plan:   Comments:Wound reclosure    Diagnostics:   Comments:Not applicable    Comments:       Disability Information   Status: Released to Restricted Duty    From:  12/27/2019  Through: 12/30/2019 Restrictions are: Temporary   Physical Restrictions   Sitting:    Standing:     Stooping:    Bending:      Squatting:    Walking:    Climbing:    Pushing:      Pulling:    Other:    Reaching Above Shoulder (L):   Reaching Above Shoulder (R):       Reaching Below Shoulder (L):    Reaching Below Shoulder (R):      Not to exceed Weight Limits   Carrying(hrs):   Weight Limit(lb):   Lifting(hrs):   Weight  Limit(lb):     Comments: No use of left upper extremity    Repetitive Actions   Hands: i.e. Fine Manipulations from Graspin hrs/day  Comments:No use left upper extremity   Feet: i.e. Operating Foot Controls:     Driving / Operate Machinery:     Physician Name: Bernard Mott M.D. Physician Signature: BERNARD New M.D. e-Signature: Dr. Fabio Truong, Medical Director   Clinic Name / Location: [address] [address] Clinic Phone Number: Dept: [phone]   Appointment Time: 2:30 Pm Visit Start Time: 2:34 PM   Check-In Time:  2:26 Pm Visit Discharge Time:  2:42 PM   Original-Treating Physician or Chiropractor    Page 2-Insurer/TPA    Page 3-Employer    Page 4-Employee

## 2019-12-27 NOTE — PROGRESS NOTES
Subjective:      Angelina Rader is a 34 y.o. female who presents with No chief complaint on file.      Date of injury 12/27/2019: 34-year-old right-hand-dominant  presents with same day follow-up evaluation for left thumb laceration sustained earlier this morning.  The left thumb wound was initially closed with glue adhesive.  The patient returned to work for a trial of regular duty and unfortunately experienced bleeding in the left thumb when attempting to perform the activities of a .  The patient denies specific second injury.  Patient states the thumb initially bled copiously yet has resolved at this time after applying direct pressure.     34-year-old right-hand-dominant  presents in same-day follow-up for left thumb laceration.  The patient attempted a trial of regular activity yet the wound re-bled with intermittent pressure from performing the standard activities of a .      Review of Systems   All other systems reviewed and are negative.         Objective:     There were no vitals taken for this visit.     Physical Exam  Vitals signs and nursing note reviewed.   Constitutional:       Appearance: Normal appearance.   HENT:      Head: Normocephalic.   Cardiovascular:      Pulses: Normal pulses.   Pulmonary:      Effort: Pulmonary effort is normal.   Musculoskeletal: Normal range of motion.   Neurological:      Mental Status: She is alert.   Psychiatric:         Mood and Affect: Mood normal.         Left thumb palmar tip: Closed 1 cm linear laceration visualized with no active bleeding, no foreign body, neurovascularly intact, the tissue adhesive remains present with a flap on the proximal quarter aspect of the wound.    Procedure note: The wound edges remained well approximated and the periphery of the glue adhesive wound closure was bolstered with glue adhesive and a Steri-Strip was applied.     Medical decision making: In the context the patient failed a trial of regular activity  with the above plan clinical presentation, work restrictions were added of no use of the left upper extremity.    Assessment/Plan:       1. Laceration of left thumb without foreign body without damage to nail, subsequent encounter    See D 39 form    See procedure note above: The wound closure was bolstered.  Work restrictions were adjusted for no use of the left upper extremity.  The patient will return in 3 days for recheck reevaluation and consideration of further management and consideration advancing work restrictions.

## 2019-12-27 NOTE — LETTER
[facility]  [address] [facility] [address] [place] [address]  Phone:  [phone] - Fax:  [phone]   [facility] Progress Report and Disability Certification  Date of Service: 12/27/2019   No Show:  No  Date / Time of Next Visit:     Claim Information   Patient Name: Angelina Rader  Claim Number:     Employer: [employer]  Date of Injury: 12/27/2019     Insurer / TPA: Roman [employer] [facility]  ID / SSN:     Occupation:   Diagnosis: The encounter diagnosis was Laceration of left thumb without foreign body without damage to nail, initial encounter.    Medical Information   Related to Industrial Injury? Yes    Subjective Complaints:  34 year old right hand dominant  presents with a complaint of a left thumb pad laceration, sustained today, 12/27/2019, after a tomato jar broke and cut the thumbs.  The employee reports initial copious bleeding and the wound was cleaned at the work site.  The employee denies loss of sensation or weakness in the thumb.   Objective Findings: Left thumb pad: 1 cm superficial, spontaneously approximated laceration, no foreign body, no active bleeding, neurovascularly intact.     Pre-Existing Condition(s):     Assessment:   Initial Visit    Status: Discharged / Care Transfer  Permanent Disability:No    Plan:   Comments:Wound closure performed with glue adhesive    Diagnostics:   Comments:n/a    Comments:       Disability Information   Status: Released to Full Duty    From:     Through:   Restrictions are:     Physical Restrictions   Sitting:    Standing:    Stooping:    Bending:      Squatting:    Walking:    Climbing:    Pushing:      Pulling:    Other:    Reaching Above Shoulder (L):   Reaching Above Shoulder (R):       Reaching Below Shoulder (L):    Reaching Below Shoulder (R):      Not to exceed Weight Limits   Carrying(hrs):   Weight Limit(lb):   Lifting(hrs):   Weight  Limit(lb):     Comments:      Repetitive Actions      Hands: i.e. Fine Manipulations from Grasping:     Feet: i.e. Operating Foot Controls:     Driving / Operate Machinery:     Physician Name: Bernard Mott M.D. Physician Signature: BERNARD New M.D. e-Signature: Dr. Fabio Truong, Medical Director   Clinic Name / Location: [address], [address]  [place] [address] Clinic Phone Number: Dept: [phone]   Appointment Time: 8:45 Am Visit Start Time: 8:49 AM   Check-In Time:  8:44 Am Visit Discharge Time:  9:27 AM   Original-Treating Physician or Chiropractor    Page 2-Insurer/TPA    Page 3-Employer    Page 4-Employee

## 2019-12-27 NOTE — PROCEDURES
Laceration Repair  Date/Time: 12/27/2019 9:10 AM  Performed by: Bernard Mott M.D.  Authorized by: Bernard Mott M.D.   Location: left thumb.  Wound length (cm): 1   Foreign bodies: no foreign bodies  Tendon involvement: none  Nerve involvement: none  Vascular damage: no    Sedation:  Patient sedated: no    Irrigation solution: Hibiclens.  Amount of cleaning: standard  Debridement: none  Skin closure: glue  Approximation difficulty: simple  Patient tolerance: Patient tolerated the procedure well with no immediate complications

## 2019-12-27 NOTE — PATIENT INSTRUCTIONS
Laceration Care, Adult  A laceration is a cut that goes through all layers of the skin. The cut also goes into the tissue that is right under the skin. Some cuts heal on their own. Others need to be closed with stitches (sutures), staples, skin adhesive strips, or wound glue. Taking care of your cut lowers your risk of infection and helps your cut to heal better.  HOW TO TAKE CARE OF YOUR CUT  For stitches or staples:  · Keep the wound clean and dry.  · If you were given a bandage (dressing), you should change it at least one time per day or as told by your doctor. You should also change it if it gets wet or dirty.  · Keep the wound completely dry for the first 24 hours or as told by your doctor. After that time, you may take a shower or a bath. However, make sure that the wound is not soaked in water until after the stitches or staples have been removed.  · Clean the wound one time each day or as told by your doctor:  ¨ Wash the wound with soap and water.  ¨ Rinse the wound with water until all of the soap comes off.  ¨ Pat the wound dry with a clean towel. Do not rub the wound.  · After you clean the wound, put a thin layer of antibiotic ointment on it as told by your doctor. This ointment:  ¨ Helps to prevent infection.  ¨ Keeps the bandage from sticking to the wound.  · Have your stitches or staples removed as told by your doctor.  If your doctor used skin adhesive strips:   · Keep the wound clean and dry.  · If you were given a bandage, you should change it at least one time per day or as told by your doctor. You should also change it if it gets dirty or wet.  · Do not get the skin adhesive strips wet. You can take a shower or a bath, but be careful to keep the wound dry.  · If the wound gets wet, pat it dry with a clean towel. Do not rub the wound.  · Skin adhesive strips fall off on their own. You can trim the strips as the wound heals. Do not remove any strips that are still stuck to the wound. They will  fall off after a while.  If your doctor used wound glue:  · Try to keep your wound dry, but you may briefly wet it in the shower or bath. Do not soak the wound in water, such as by swimming.  · After you take a shower or a bath, gently pat the wound dry with a clean towel. Do not rub the wound.  · Do not do any activities that will make you really sweaty until the skin glue has fallen off on its own.  · Do not apply liquid, cream, or ointment medicine to your wound while the skin glue is still on.  · If you were given a bandage, you should change it at least one time per day or as told by your doctor. You should also change it if it gets dirty or wet.  · If a bandage is placed over the wound, do not let the tape for the bandage touch the skin glue.  · Do not pick at the glue. The skin glue usually stays on for 5-10 days. Then, it falls off of the skin.  General Instructions   · To help prevent scarring, make sure to cover your wound with sunscreen whenever you are outside after stitches are removed, after adhesive strips are removed, or when wound glue stays in place and the wound is healed. Make sure to wear a sunscreen of at least 30 SPF.  · Take over-the-counter and prescription medicines only as told by your doctor.  · If you were given antibiotic medicine or ointment, take or apply it as told by your doctor. Do not stop using the antibiotic even if your wound is getting better.  · Do not scratch or pick at the wound.  · Keep all follow-up visits as told by your doctor. This is important.  · Check your wound every day for signs of infection. Watch for:  ¨ Redness, swelling, or pain.  ¨ Fluid, blood, or pus.  · Raise (elevate) the injured area above the level of your heart while you are sitting or lying down, if possible.  GET HELP IF:  · You got a tetanus shot and you have any of these problems at the injection site:  ¨ Swelling.  ¨ Very bad pain.  ¨ Redness.  ¨ Bleeding.  · You have a fever.  · A wound that was  closed breaks open.  · You notice a bad smell coming from your wound or your bandage.  · You notice something coming out of the wound, such as wood or glass.  · Medicine does not help your pain.  · You have more redness, swelling, or pain at the site of your wound.  · You have fluid, blood, or pus coming from your wound.  · You notice a change in the color of your skin near your wound.  · You need to change the bandage often because fluid, blood, or pus is coming from the wound.  · You start to have a new rash.  · You start to have numbness around the wound.  GET HELP RIGHT AWAY IF:  · You have very bad swelling around the wound.  · Your pain suddenly gets worse and is very bad.  · You notice painful lumps near the wound or on skin that is anywhere on your body.  · You have a red streak going away from your wound.  · The wound is on your hand or foot and you cannot move a finger or toe like you usually can.  · The wound is on your hand or foot and you notice that your fingers or toes look pale or bluish.     This information is not intended to replace advice given to you by your health care provider. Make sure you discuss any questions you have with your health care provider.     Document Released: 06/05/2009 Document Revised: 05/03/2016 Document Reviewed: 12/14/2015  [employer] Interactive Patient Education ©2016 [employer]

## 2019-12-27 NOTE — PATIENT INSTRUCTIONS
Patient is doing well post-operatively. The importance of post-op drop compliance was emphasized. Drop schedule reviewed with patient. Patient to call if any visual changes or concerns. Tissue Adhesive Wound Care  Some cuts and wounds can be closed with tissue adhesive. Adhesive is like glue. It holds the skin together and helps a wound heal faster. This adhesive goes away on its own as the wound heals.   HOME CARE   · Showers are allowed. Do not soak the wound in water. Do not take baths, swim, or use hot tubs. Do not use soaps or creams on your wound.  · If a bandage (dressing) was put on, change it as often as told by your doctor.  · Keep the bandage dry.  · Do not scratch, pick, or rub the adhesive.  · Do not put tape over the adhesive. The adhesive could come off.  · Protect the wound from another injury.  · Protect the wound from sun and tanning beds.  · Only take medicine as told by your doctor.  · Keep all doctor visits as told.  GET HELP RIGHT AWAY IF:   · Your wound is red, puffy (swollen), hot, or tender.  · You get a rash after the glue is put on.  · You have more pain in the wound.  · You have a red streak going away from the wound.  · You have yellowish-white fluid (pus) coming from the wound.  · You have more bleeding.  · You have a fever.  · You have chills and start to shake.  · You notice a bad smell coming from the wound.  · Your wound or adhesive breaks open.  MAKE SURE YOU:   · Understand these instructions.  · Will watch your condition.  · Will get help right away if you are not doing well or get worse.  This information is not intended to replace advice given to you by your health care provider. Make sure you discuss any questions you have with your health care provider.  Document Released: 09/26/2009 Document Revised: 10/08/2014 Document Reviewed: 07/09/2014  [employer] Interactive Patient Education © 2017 [employer]

## 2019-12-27 NOTE — PROGRESS NOTES
"Subjective:      Angelina Rader is a 34 y.o. female who presents with Puncture Wound (left hand thumb puncture wound)      34 year old right hand dominant  presents with a complaint of a left thumb pad laceration, sustained today, 12/27/2019, after a tomato jar broke and cut the thumbs.  The employee reports initial copious bleeding and the wound was cleaned at the work site.  The employee denies loss of sensation or weakness in the thumb.     Puncture Wound          Review of Systems   Constitutional: Negative for fever.   Neurological: Negative for sensory change and focal weakness.   All other systems reviewed and are negative.         Objective:     /70   Pulse 92   Temp 36.6 °C (97.8 °F) (Temporal)   Resp 16   Ht 1.651 m (5' 5\")   Wt 84.7 kg (186 lb 11.7 oz)   SpO2 97%   BMI 31.07 kg/m²      Physical Exam  Vitals signs and nursing note reviewed.   Constitutional:       Appearance: Normal appearance.   HENT:      Head: Normocephalic.   Eyes:      Conjunctiva/sclera: Conjunctivae normal.   Cardiovascular:      Pulses: Normal pulses.   Pulmonary:      Effort: Pulmonary effort is normal.   Musculoskeletal: Normal range of motion.   Neurological:      Mental Status: She is alert.   Psychiatric:         Mood and Affect: Mood normal.         Left thumb pad: 1 cm superficial, spontaneously approximated laceration, no foreign body, no active bleeding, neurovascularly intact.         Assessment/Plan:       1. Laceration of left thumb without foreign body without damage to nail, initial encounter      See D-39 form    Return to work full duty, released from care  - Laceration Repair        "

## 2021-05-30 ENCOUNTER — HOSPITAL ENCOUNTER (EMERGENCY)
Facility: MEDICAL CENTER | Age: 36
End: 2021-05-30
Attending: EMERGENCY MEDICINE
Payer: MEDICAID

## 2021-05-30 ENCOUNTER — APPOINTMENT (OUTPATIENT)
Dept: RADIOLOGY | Facility: MEDICAL CENTER | Age: 36
End: 2021-05-30
Attending: EMERGENCY MEDICINE
Payer: MEDICAID

## 2021-05-30 VITALS
DIASTOLIC BLOOD PRESSURE: 79 MMHG | RESPIRATION RATE: 16 BRPM | SYSTOLIC BLOOD PRESSURE: 132 MMHG | TEMPERATURE: 98.7 F | HEIGHT: 65 IN | BODY MASS INDEX: 30.99 KG/M2 | WEIGHT: 186 LBS | OXYGEN SATURATION: 98 % | HEART RATE: 81 BPM

## 2021-05-30 DIAGNOSIS — Z34.90 EARLY STAGE OF PREGNANCY: ICD-10-CM

## 2021-05-30 DIAGNOSIS — N30.90 CYSTITIS: Primary | ICD-10-CM

## 2021-05-30 DIAGNOSIS — E87.6 HYPOKALEMIA: ICD-10-CM

## 2021-05-30 LAB
ALBUMIN SERPL BCP-MCNC: 3.7 G/DL (ref 3.2–4.9)
ALBUMIN/GLOB SERPL: 1.1 G/DL
ALP SERPL-CCNC: 70 U/L (ref 30–99)
ALT SERPL-CCNC: 18 U/L (ref 2–50)
ANION GAP SERPL CALC-SCNC: 12 MMOL/L (ref 7–16)
APPEARANCE UR: ABNORMAL
AST SERPL-CCNC: 22 U/L (ref 12–45)
B-HCG SERPL-ACNC: 1960 MIU/ML (ref 0–5)
BACTERIA #/AREA URNS HPF: ABNORMAL /HPF
BASOPHILS # BLD AUTO: 0.3 % (ref 0–1.8)
BASOPHILS # BLD: 0.03 K/UL (ref 0–0.12)
BILIRUB SERPL-MCNC: 0.3 MG/DL (ref 0.1–1.5)
BILIRUB UR QL STRIP.AUTO: NEGATIVE
BUN SERPL-MCNC: 9 MG/DL (ref 8–22)
CALCIUM SERPL-MCNC: 8.9 MG/DL (ref 8.5–10.5)
CHLORIDE SERPL-SCNC: 110 MMOL/L (ref 96–112)
CO2 SERPL-SCNC: 20 MMOL/L (ref 20–33)
COLOR UR: YELLOW
CREAT SERPL-MCNC: 0.56 MG/DL (ref 0.5–1.4)
EOSINOPHIL # BLD AUTO: 0.12 K/UL (ref 0–0.51)
EOSINOPHIL NFR BLD: 1.2 % (ref 0–6.9)
EPI CELLS #/AREA URNS HPF: ABNORMAL /HPF
ERYTHROCYTE [DISTWIDTH] IN BLOOD BY AUTOMATED COUNT: 45.4 FL (ref 35.9–50)
GLOBULIN SER CALC-MCNC: 3.4 G/DL (ref 1.9–3.5)
GLUCOSE SERPL-MCNC: 94 MG/DL (ref 65–99)
GLUCOSE UR STRIP.AUTO-MCNC: NEGATIVE MG/DL
HCG SERPL QL: POSITIVE
HCT VFR BLD AUTO: 38.6 % (ref 37–47)
HGB BLD-MCNC: 12.8 G/DL (ref 12–16)
HYALINE CASTS #/AREA URNS LPF: ABNORMAL /LPF
IMM GRANULOCYTES # BLD AUTO: 0.06 K/UL (ref 0–0.11)
IMM GRANULOCYTES NFR BLD AUTO: 0.6 % (ref 0–0.9)
KETONES UR STRIP.AUTO-MCNC: 40 MG/DL
LEUKOCYTE ESTERASE UR QL STRIP.AUTO: ABNORMAL
LIPASE SERPL-CCNC: 19 U/L (ref 11–82)
LYMPHOCYTES # BLD AUTO: 2.78 K/UL (ref 1–4.8)
LYMPHOCYTES NFR BLD: 28.1 % (ref 22–41)
MCH RBC QN AUTO: 28.9 PG (ref 27–33)
MCHC RBC AUTO-ENTMCNC: 33.2 G/DL (ref 33.6–35)
MCV RBC AUTO: 87.1 FL (ref 81.4–97.8)
MICRO URNS: ABNORMAL
MONOCYTES # BLD AUTO: 0.54 K/UL (ref 0–0.85)
MONOCYTES NFR BLD AUTO: 5.5 % (ref 0–13.4)
NEUTROPHILS # BLD AUTO: 6.37 K/UL (ref 2–7.15)
NEUTROPHILS NFR BLD: 64.3 % (ref 44–72)
NITRITE UR QL STRIP.AUTO: POSITIVE
NRBC # BLD AUTO: 0 K/UL
NRBC BLD-RTO: 0 /100 WBC
PH UR STRIP.AUTO: 6 [PH] (ref 5–8)
PLATELET # BLD AUTO: 249 K/UL (ref 164–446)
PMV BLD AUTO: 9.5 FL (ref 9–12.9)
POTASSIUM SERPL-SCNC: 3.1 MMOL/L (ref 3.6–5.5)
PROT SERPL-MCNC: 7.1 G/DL (ref 6–8.2)
PROT UR QL STRIP: 30 MG/DL
RBC # BLD AUTO: 4.43 M/UL (ref 4.2–5.4)
RBC # URNS HPF: ABNORMAL /HPF
RBC UR QL AUTO: ABNORMAL
SODIUM SERPL-SCNC: 142 MMOL/L (ref 135–145)
SP GR UR STRIP.AUTO: 1.03
UROBILINOGEN UR STRIP.AUTO-MCNC: 0.2 MG/DL
WBC # BLD AUTO: 9.9 K/UL (ref 4.8–10.8)
WBC #/AREA URNS HPF: ABNORMAL /HPF

## 2021-05-30 PROCEDURE — 96365 THER/PROPH/DIAG IV INF INIT: CPT

## 2021-05-30 PROCEDURE — 700111 HCHG RX REV CODE 636 W/ 250 OVERRIDE (IP): Performed by: EMERGENCY MEDICINE

## 2021-05-30 PROCEDURE — A9270 NON-COVERED ITEM OR SERVICE: HCPCS | Performed by: EMERGENCY MEDICINE

## 2021-05-30 PROCEDURE — 700105 HCHG RX REV CODE 258: Performed by: EMERGENCY MEDICINE

## 2021-05-30 PROCEDURE — 76801 OB US < 14 WKS SINGLE FETUS: CPT

## 2021-05-30 PROCEDURE — 81001 URINALYSIS AUTO W/SCOPE: CPT

## 2021-05-30 PROCEDURE — 36415 COLL VENOUS BLD VENIPUNCTURE: CPT

## 2021-05-30 PROCEDURE — 83690 ASSAY OF LIPASE: CPT

## 2021-05-30 PROCEDURE — 84703 CHORIONIC GONADOTROPIN ASSAY: CPT

## 2021-05-30 PROCEDURE — 84702 CHORIONIC GONADOTROPIN TEST: CPT

## 2021-05-30 PROCEDURE — 700102 HCHG RX REV CODE 250 W/ 637 OVERRIDE(OP): Performed by: EMERGENCY MEDICINE

## 2021-05-30 PROCEDURE — 87186 SC STD MICRODIL/AGAR DIL: CPT

## 2021-05-30 PROCEDURE — 99285 EMERGENCY DEPT VISIT HI MDM: CPT

## 2021-05-30 PROCEDURE — 85025 COMPLETE CBC W/AUTO DIFF WBC: CPT

## 2021-05-30 PROCEDURE — 87086 URINE CULTURE/COLONY COUNT: CPT

## 2021-05-30 PROCEDURE — 87077 CULTURE AEROBIC IDENTIFY: CPT

## 2021-05-30 PROCEDURE — 80053 COMPREHEN METABOLIC PANEL: CPT

## 2021-05-30 RX ORDER — ONDANSETRON 4 MG/1
4 TABLET, ORALLY DISINTEGRATING ORAL ONCE
Status: COMPLETED | OUTPATIENT
Start: 2021-05-30 | End: 2021-05-30

## 2021-05-30 RX ORDER — ACETAMINOPHEN 500 MG
1000 TABLET ORAL ONCE
Status: COMPLETED | OUTPATIENT
Start: 2021-05-30 | End: 2021-05-30

## 2021-05-30 RX ORDER — CEPHALEXIN 500 MG/1
500 CAPSULE ORAL 4 TIMES DAILY
Qty: 28 CAPSULE | Refills: 0 | Status: SHIPPED | OUTPATIENT
Start: 2021-05-30 | End: 2021-06-06

## 2021-05-30 RX ADMIN — ACETAMINOPHEN 1000 MG: 500 TABLET ORAL at 01:28

## 2021-05-30 RX ADMIN — CEFTRIAXONE SODIUM 2 G: 2 INJECTION, POWDER, FOR SOLUTION INTRAMUSCULAR; INTRAVENOUS at 01:28

## 2021-05-30 RX ADMIN — ONDANSETRON 4 MG: 4 TABLET, ORALLY DISINTEGRATING ORAL at 01:28

## 2021-05-30 ASSESSMENT — ENCOUNTER SYMPTOMS
SHORTNESS OF BREATH: 0
CHILLS: 0
NECK PAIN: 0
SORE THROAT: 0
FEVER: 0
COUGH: 0
DIZZINESS: 0
HEADACHES: 0

## 2021-05-30 ASSESSMENT — LIFESTYLE VARIABLES: SUBSTANCE_ABUSE: 0

## 2021-05-30 ASSESSMENT — FIBROSIS 4 INDEX: FIB4 SCORE: 0.57

## 2021-05-30 NOTE — LETTER
6/2/2021               Angelina Rader   [address]  [place] [address]        Dear Angelina (MR#[id number])    This letter is sent in regards to your recent visit to the [facility] Emergency Department on 5/30/2021. During the visit, tests were performed to assist the physician in your medical diagnosis. A review of your tests requires that we notify you of the following:    Your urine culture was POSITIVE for a bacteria called Escherichia coli. The antibiotic prescribed for you (cephalexin) should be active to treat this bacteria. It is important that you continue taking your antibiotic until it is finished.     Please feel free to contact me at the number below if you have any questions or concerns. Thank you for your cooperation in the matter.    Sincerely,  ED Culture Follow-Up Staff  Ana Sanders, PharmD    [facility], Emergency Department  [address] [place] [id number] [phone] (ED Culture Line)

## 2021-05-30 NOTE — ED PROVIDER NOTES
ED Provider Note     5/30/2021  12:19 AM    Means of Arrival: walk in  History obtained by: patient  Limitations: None  PCP: none  CODE STATUS: Full    CHIEF COMPLAINT  Chief Complaint   Patient presents with   • Abdominal Pain   • Painful Urination       HPI  Angelina Rader is a 35 y.o. female who presents with concerns of lower abdominal cramping pain.  She first had the pain last night.  Use it yesterday evening she had over the.  Of a few minutes sharp cramping intense pain at the lower mid abdomen radiating around to her right flank.  The pain resolved on its own.  She had the pain again this evening and this time the pain is more in the mid lower abdomen and radiated to both sides of the lower abdomen.  No vomiting but she does have some nausea.  Denies any urinary frequency or pain with urination.  Denies any vaginal symptoms.  She says she has had sexually transmitted infections last year and the symptoms are nothing like that time.  Last menstrual cycle over 1 month ago.  She has had 8 pregnancies, 4 children.    REVIEW OF SYSTEMS  Review of Systems   Constitutional: Negative for chills, fever and malaise/fatigue.   HENT: Negative for sore throat.    Respiratory: Negative for cough and shortness of breath.    Cardiovascular: Negative for chest pain.   Gastrointestinal: Diarrhea: loose stools, no improvement in pain after BM.   Genitourinary: Negative for dysuria, frequency, hematuria and urgency.   Musculoskeletal: Negative for neck pain.   Neurological: Negative for dizziness and headaches.   Psychiatric/Behavioral: Negative for substance abuse.   All other systems reviewed and are negative.    See HPI for further details.    PAST MEDICAL HISTORY   has a past medical history of Psychiatric problem and Urinary tract infection, site not specified.    SOCIAL HISTORY  Social History     Tobacco Use   • Smoking status: Former Smoker     Packs/day: 0.00     Years: 9.00     Pack years: 0.00     Quit date:  "2014     Years since quittin.4   • Smokeless tobacco: Never Used   • Tobacco comment: Pt. states quit when she found out she was pregnant.    Vaping Use   • Vaping Use: Never used   Substance and Sexual Activity   • Alcohol use: No   • Drug use: No   • Sexual activity: Yes     Partners: Male     Comment: None       SURGICAL HISTORY   has a past surgical history that includes dilation and curettage (); abdominal exploration (); cholecystectomy; primary c section (2015); and repeat c section (N/A, 2019).    CURRENT MEDICATIONS  Home Medications    **Home medications have not yet been reviewed for this encounter**         ALLERGIES  No Known Allergies    PHYSICAL EXAM  VITAL SIGNS: /79   Pulse 81   Temp 37.1 °C (98.7 °F) (Oral)   Resp 16   Ht 1.651 m (5' 5\")   Wt 84.4 kg (186 lb)   SpO2 98%   BMI 30.95 kg/m²     Pulse ox interpretation: I interpret this pulse ox as normal.  Constitutional: Alert in no apparent distress.  35-year-old woman, pleasant with a slight elevated BMI body habitus.  HENT: No signs of trauma, Bilateral external ears normal, Nose normal.   Eyes: Pupils are equal, Conjunctiva normal, Non-icteric.   Neck: Normal range of motion, No tenderness, Supple, No stridor.   Lymphatic: No lymphadenopathy noted.   Cardiovascular: Regular rate and rhythm, no murmurs. Symmetric distal pulses. No cyanosis of extremities. No peripheral edema of extremities.  Thorax & Lungs: Normal breath sounds, No respiratory distress, No wheezing, No chest tenderness.   Abdomen: Bowel sounds normal, Soft, tenderness in the lower abdomen/pelvic region without guarding or peritoneal signs.  No masses, No pulsatile masses.   Skin: Warm, Dry, No erythema, No rash.   Back: No midline bony tenderness, No CVA tenderness.   Musculoskeletal: Good range of motion in all major joints. No tenderness to palpation or major deformities noted.   Neurologic: Alert , Normal motor function, Normal sensory " function, No focal deficits noted.   Psychiatric: Affect normal, Judgment normal, Mood normal.   Physical Exam      DIAGNOSTIC STUDIES / PROCEDURES      LABS  Pertinent Labs & Imaging studies reviewed. (See chart for details)    RADIOLOGY  Pertinent Labs & Imaging studies reviewed. (See chart for details)    COURSE & MEDICAL DECISION MAKING  Pertinent Labs & Imaging studies reviewed. (See chart for details)    12:19 AM This is an emergent evaluation of a  35 y.o. female who presents with lower abdominal cramping pain for the past 2 days.  On my interview she denied urinary or vaginal symptoms however at triage mention painful urination.  She does have a history of urinary tract infections.  We will send serum studies to look for signs of inflammation, electrolyte abnormalities, organ dysfunction.  Urine studies to look for pregnancy, urine abnormality such as infection.  Other causes of pain could be due to to diarrheal illness, ovarian cysts.  Of also considered sexually transmitted infections but she denies any such symptoms, has had infections in the past but she cannot member which type.  I see she had a positive chlamydia test in 2014.    1:18 AM  Pregnancy test positive. UA consistent with UTI. CBC wnl. Will order HCG quant, US pelvis. She will be given rocephin for UTI, will likely discharge with Keflex.  I asked her about any vaginal bleeding.  She says 1 week ago she had some spotting.  No bleeding for the past several days.    10:12 AM  Ultrasound showed a very small cystlike structure in the uterus which likely is a very early pregnancy.  There is no yolk sac or fetal pole visible.  Beta-hCG only 1000 160.  This is a very early pregnancy.  She is well established with Dr. Joey Lacy.  She plans to call him this week to arrange for follow-up.  She notes that her urine was suggestive of a urinary tract infection and she has been treated with a dose of Rocephin and p.o. Keflex.  Potassium was slightly low  and this should easily correct with regular diet.       The patient will return for worsening symptoms and is stable at the time of discharge.  She knows to come back to emerge department she develops worsening pain,  vaginal bleeding, fever, frequent vomiting.  The patient verbalizes understanding. Guidance was provided on appropriate use of medications including driving under the influence, overdose, and side effects.         FINAL IMPRESSION    ICD-10-CM   1. Cystitis  N30.90   2. Hypokalemia  E87.6   3. Early stage of pregnancy  Z34.90            This dictation was created using voice recognition software. The accuracy of the dictation is limited to the abilities of the software. I expect there may be some errors of grammar and possibly content. The nursing notes were reviewed and certain aspects of this information were incorporated into this note.    Electronically signed by: Parish Sharma II, M.D., 5/30/2021 12:19 AM

## 2021-05-30 NOTE — ED TRIAGE NOTES
Chief Complaint   Patient presents with   • Abdominal Pain   • Painful Urination     BIB [facility], pt ambulating with a steady gait c/o diffuse lower abd cramping with dysuria and n/v/d x1 day    PPE Note: I personally donned full PPE for all patient encounters during this visit, including a N95 respirator mask, gloves, and goggles.

## 2021-06-02 NOTE — ED NOTES
"ED Positive Culture Follow-up/Notification Note:    Date: 6/2/21     Patient seen in the ED on 5/30/2021 for abdominal pain and painful urination.   1. Cystitis    2. Hypokalemia    3. Early stage of pregnancy       Discharge Medication List as of 5/30/2021  3:03 AM      START taking these medications    Details   cephALEXin (KEFLEX) 500 MG Cap Take 1 capsule by mouth 4 times a day for 7 days., Disp-28 capsule, R-0, Normal             Allergies: Patient has no known allergies.     Vitals:    05/30/21 0012 05/30/21 0016 05/30/21 0130   BP:  139/71 132/79   Pulse:  88 81   Resp:  14 16   Temp:  37.1 °C (98.7 °F)    TempSrc:  Oral    SpO2:  98% 98%   Weight: 84.4 kg (186 lb)     Height: 1.651 m (5' 5\")         Final cultures:   Results     Procedure Component Value Units Date/Time    URINE CULTURE(NEW) [[id number]]  (Abnormal)  (Susceptibility) Collected: 05/30/21 0020    Order Status: Completed Specimen: Urine Updated: 06/01/21 0806     Significant Indicator POS     Source UR     Site -     Culture Result -      Escherichia coli  ,000 cfu/mL      Narrative:      Indication for culture:->Patient WITHOUT an indwelling Terry  catheter in place with new onset of Dysuria, Frequency,  Urgency, and/or Suprapubic pain    Susceptibility     Escherichia coli (1)     Antibiotic Interpretation Microscan Method Status    Amikacin  [*]  Sensitive <=16 mcg/mL BAILEY Final    Ampicillin Resistant >16 mcg/mL BAILEY Final    Amoxicillin/CA  [*]  Sensitive <=8/4 mcg/mL BAILEY Final    Aztreonam  [*]  Sensitive <=4 mcg/mL BAILEY Final    Ceftolozane+Tazobactam  [*]  Sensitive <=2 mcg/mL BAILEY Final    Ceftriaxone Sensitive <=1 mcg/mL BAILEY Final    Ceftazidime  [*]  Sensitive <=1 mcg/mL BAILEY Final    Cefazolin Sensitive <=2 mcg/mL BAILEY Final    Ciprofloxacin Sensitive <=0.25 mcg/mL BAILEY Final    Cefepime Sensitive <=2 mcg/mL BAILEY Final    Cefuroxime Sensitive 8 mcg/mL BAILEY Final    Ampicillin/sulbactam Sensitive 8/4 mcg/mL BAILEY Final    " Ceftazidime+Avibactam  [*]  Sensitive <=4 mcg/mL BAILEY Final    Tobramycin Sensitive <=2 mcg/mL BAILEY Final    Ertapenem  [*]  Sensitive <=0.5 mcg/mL BAILEY Final    Nitrofurantoin Sensitive <=32 mcg/mL BAILEY Final    Gentamicin Sensitive <=2 mcg/mL BAILEY Final    Imipenem  [*]  Sensitive <=1 mcg/mL BAILEY Final    Levofloxacin Sensitive <=0.5 mcg/mL BAILEY Final    Meropenem  [*]  Sensitive <=1 mcg/mL BAILEY Final    Meropenem/Vaborbactam  [*]  Sensitive <=2 mcg/mL BAILEY Final    Pip/Tazobactam Sensitive <=8 mcg/mL BAILEY Final    Trimeth/Sulfa Sensitive <=0.5/9.5 mcg/mL BAILEY Final    Tetracycline  [*]  Resistant >8 mcg/mL BAILEY Final    Tigecycline Sensitive <=2 mcg/mL BAILEY Final           [*]  Suppressed Antibiotic                 URINALYSIS,CULTURE IF INDICATED [[id number]]  (Abnormal) Collected: 05/30/21 0020    Order Status: Completed Specimen: Urine Updated: 05/30/21 0102     Color Yellow     Character Cloudy     Specific Gravity 1.028     Ph 6.0     Glucose Negative mg/dL      Ketones 40 mg/dL      Protein 30 mg/dL      Bilirubin Negative     Urobilinogen, Urine 0.2     Nitrite Positive     Leukocyte Esterase Moderate     Occult Blood Small     Micro Urine Req Microscopic    Narrative:      Indication for culture:->Patient WITHOUT an indwelling Terry  catheter in place with new onset of Dysuria, Frequency,  Urgency, and/or Suprapubic pain          Plan:   Appropriate antibiotic therapy prescribed. No changes required based upon culture result.  Sent letter to patient to notify of positive culture result and encourage compliance with prescribed antibiotics.     Ana Sanders, PharmD

## 2022-01-03 LAB — GP B STREP DNA SPEC QL NAA+PROBE: NORMAL

## 2022-01-12 ENCOUNTER — PRE-ADMISSION TESTING (OUTPATIENT)
Dept: ADMISSIONS | Facility: MEDICAL CENTER | Age: 37
End: 2022-01-12
Attending: SPECIALIST
Payer: MEDICAID

## 2022-01-16 ENCOUNTER — HOSPITAL ENCOUNTER (INPATIENT)
Facility: MEDICAL CENTER | Age: 37
LOS: 3 days | End: 2022-01-19
Attending: SPECIALIST | Admitting: SPECIALIST
Payer: MEDICAID

## 2022-01-16 ENCOUNTER — ANESTHESIA EVENT (OUTPATIENT)
Dept: OBGYN | Facility: MEDICAL CENTER | Age: 37
End: 2022-01-16
Payer: MEDICAID

## 2022-01-16 ENCOUNTER — ANESTHESIA (OUTPATIENT)
Dept: OBGYN | Facility: MEDICAL CENTER | Age: 37
End: 2022-01-16
Payer: MEDICAID

## 2022-01-16 DIAGNOSIS — D62 ACUTE POSTHEMORRHAGIC ANEMIA: ICD-10-CM

## 2022-01-16 DIAGNOSIS — K59.01 CONSTIPATION BY DELAYED COLONIC TRANSIT: ICD-10-CM

## 2022-01-16 DIAGNOSIS — G89.18 POST-OP PAIN: ICD-10-CM

## 2022-01-16 LAB
ABO GROUP BLD: NORMAL
BASOPHILS # BLD AUTO: 0.3 % (ref 0–1.8)
BASOPHILS # BLD AUTO: 0.3 % (ref 0–1.8)
BASOPHILS # BLD: 0.03 K/UL (ref 0–0.12)
BASOPHILS # BLD: 0.03 K/UL (ref 0–0.12)
BLD GP AB SCN SERPL QL: NORMAL
EOSINOPHIL # BLD AUTO: 0.05 K/UL (ref 0–0.51)
EOSINOPHIL # BLD AUTO: 0.06 K/UL (ref 0–0.51)
EOSINOPHIL NFR BLD: 0.5 % (ref 0–6.9)
EOSINOPHIL NFR BLD: 0.7 % (ref 0–6.9)
ERYTHROCYTE [DISTWIDTH] IN BLOOD BY AUTOMATED COUNT: 49.9 FL (ref 35.9–50)
ERYTHROCYTE [DISTWIDTH] IN BLOOD BY AUTOMATED COUNT: 50.4 FL (ref 35.9–50)
EST. AVERAGE GLUCOSE BLD GHB EST-MCNC: 160 MG/DL
HBA1C MFR BLD: 7.2 % (ref 4–5.6)
HBV SURFACE AG SER QL: ABNORMAL
HCT VFR BLD AUTO: 33.2 % (ref 37–47)
HCT VFR BLD AUTO: 33.9 % (ref 37–47)
HCV AB SER QL: ABNORMAL
HGB BLD-MCNC: 10.1 G/DL (ref 12–16)
HGB BLD-MCNC: 10.2 G/DL (ref 12–16)
HIV 1+2 AB+HIV1 P24 AG SERPL QL IA: NORMAL
HOLDING TUBE BB 8507: NORMAL
IMM GRANULOCYTES # BLD AUTO: 0.1 K/UL (ref 0–0.11)
IMM GRANULOCYTES # BLD AUTO: 0.12 K/UL (ref 0–0.11)
IMM GRANULOCYTES NFR BLD AUTO: 1.1 % (ref 0–0.9)
IMM GRANULOCYTES NFR BLD AUTO: 1.3 % (ref 0–0.9)
LYMPHOCYTES # BLD AUTO: 1.9 K/UL (ref 1–4.8)
LYMPHOCYTES # BLD AUTO: 2.08 K/UL (ref 1–4.8)
LYMPHOCYTES NFR BLD: 21.3 % (ref 22–41)
LYMPHOCYTES NFR BLD: 22.2 % (ref 22–41)
MCH RBC QN AUTO: 25.1 PG (ref 27–33)
MCH RBC QN AUTO: 25.6 PG (ref 27–33)
MCHC RBC AUTO-ENTMCNC: 30.1 G/DL (ref 33.6–35)
MCHC RBC AUTO-ENTMCNC: 30.4 G/DL (ref 33.6–35)
MCV RBC AUTO: 83.5 FL (ref 81.4–97.8)
MCV RBC AUTO: 84.1 FL (ref 81.4–97.8)
MONOCYTES # BLD AUTO: 0.47 K/UL (ref 0–0.85)
MONOCYTES # BLD AUTO: 0.48 K/UL (ref 0–0.85)
MONOCYTES NFR BLD AUTO: 5 % (ref 0–13.4)
MONOCYTES NFR BLD AUTO: 5.4 % (ref 0–13.4)
NEUTROPHILS # BLD AUTO: 6.34 K/UL (ref 2–7.15)
NEUTROPHILS # BLD AUTO: 6.61 K/UL (ref 2–7.15)
NEUTROPHILS NFR BLD: 70.7 % (ref 44–72)
NEUTROPHILS NFR BLD: 71.2 % (ref 44–72)
NRBC # BLD AUTO: 0.04 K/UL
NRBC # BLD AUTO: 0.04 K/UL
NRBC BLD-RTO: 0.4 /100 WBC
NRBC BLD-RTO: 0.4 /100 WBC
PLATELET # BLD AUTO: 226 K/UL (ref 164–446)
PLATELET # BLD AUTO: 226 K/UL (ref 164–446)
PMV BLD AUTO: 9.6 FL (ref 9–12.9)
PMV BLD AUTO: 9.8 FL (ref 9–12.9)
RBC # BLD AUTO: 3.95 M/UL (ref 4.2–5.4)
RBC # BLD AUTO: 4.06 M/UL (ref 4.2–5.4)
RH BLD: NORMAL
RUBV AB SER QL: 31.4 IU/ML
SARS-COV+SARS-COV-2 AG RESP QL IA.RAPID: NOTDETECTED
SPECIMEN SOURCE: NORMAL
TREPONEMA PALLIDUM IGG+IGM AB [PRESENCE] IN SERUM OR PLASMA BY IMMUNOASSAY: ABNORMAL
WBC # BLD AUTO: 8.9 K/UL (ref 4.8–10.8)
WBC # BLD AUTO: 9.4 K/UL (ref 4.8–10.8)

## 2022-01-16 PROCEDURE — 160009 HCHG ANES TIME/MIN: Performed by: SPECIALIST

## 2022-01-16 PROCEDURE — 160002 HCHG RECOVERY MINUTES (STAT): Performed by: SPECIALIST

## 2022-01-16 PROCEDURE — 160035 HCHG PACU - 1ST 60 MINS PHASE I: Performed by: SPECIALIST

## 2022-01-16 PROCEDURE — 160048 HCHG OR STATISTICAL LEVEL 1-5: Performed by: SPECIALIST

## 2022-01-16 PROCEDURE — 160029 HCHG SURGERY MINUTES - 1ST 30 MINS LEVEL 4: Performed by: SPECIALIST

## 2022-01-16 PROCEDURE — 770002 HCHG ROOM/CARE - OB PRIVATE (112)

## 2022-01-16 PROCEDURE — 700111 HCHG RX REV CODE 636 W/ 250 OVERRIDE (IP)

## 2022-01-16 PROCEDURE — 87426 SARSCOV CORONAVIRUS AG IA: CPT

## 2022-01-16 PROCEDURE — 87340 HEPATITIS B SURFACE AG IA: CPT

## 2022-01-16 PROCEDURE — 86803 HEPATITIS C AB TEST: CPT

## 2022-01-16 PROCEDURE — 700102 HCHG RX REV CODE 250 W/ 637 OVERRIDE(OP): Performed by: SPECIALIST

## 2022-01-16 PROCEDURE — 87389 HIV-1 AG W/HIV-1&-2 AB AG IA: CPT

## 2022-01-16 PROCEDURE — 700105 HCHG RX REV CODE 258: Performed by: ANESTHESIOLOGY

## 2022-01-16 PROCEDURE — 86901 BLOOD TYPING SEROLOGIC RH(D): CPT

## 2022-01-16 PROCEDURE — 86900 BLOOD TYPING SEROLOGIC ABO: CPT

## 2022-01-16 PROCEDURE — A9270 NON-COVERED ITEM OR SERVICE: HCPCS | Performed by: SPECIALIST

## 2022-01-16 PROCEDURE — 83036 HEMOGLOBIN GLYCOSYLATED A1C: CPT

## 2022-01-16 PROCEDURE — A9270 NON-COVERED ITEM OR SERVICE: HCPCS | Performed by: ANESTHESIOLOGY

## 2022-01-16 PROCEDURE — 36415 COLL VENOUS BLD VENIPUNCTURE: CPT

## 2022-01-16 PROCEDURE — 700102 HCHG RX REV CODE 250 W/ 637 OVERRIDE(OP): Performed by: ANESTHESIOLOGY

## 2022-01-16 PROCEDURE — 86780 TREPONEMA PALLIDUM: CPT

## 2022-01-16 PROCEDURE — 160041 HCHG SURGERY MINUTES - EA ADDL 1 MIN LEVEL 4: Performed by: SPECIALIST

## 2022-01-16 PROCEDURE — 86762 RUBELLA ANTIBODY: CPT

## 2022-01-16 PROCEDURE — 85025 COMPLETE CBC W/AUTO DIFF WBC: CPT

## 2022-01-16 PROCEDURE — 700111 HCHG RX REV CODE 636 W/ 250 OVERRIDE (IP): Performed by: ANESTHESIOLOGY

## 2022-01-16 PROCEDURE — 700101 HCHG RX REV CODE 250: Performed by: ANESTHESIOLOGY

## 2022-01-16 PROCEDURE — 86850 RBC ANTIBODY SCREEN: CPT

## 2022-01-16 RX ORDER — DIPHENHYDRAMINE HYDROCHLORIDE 50 MG/ML
25 INJECTION INTRAMUSCULAR; INTRAVENOUS EVERY 6 HOURS PRN
Status: ACTIVE | OUTPATIENT
Start: 2022-01-16 | End: 2022-01-17

## 2022-01-16 RX ORDER — DIPHENHYDRAMINE HYDROCHLORIDE 50 MG/ML
12.5 INJECTION INTRAMUSCULAR; INTRAVENOUS EVERY 6 HOURS PRN
Status: DISPENSED | OUTPATIENT
Start: 2022-01-16 | End: 2022-01-17

## 2022-01-16 RX ORDER — CITRIC ACID/SODIUM CITRATE 334-500MG
30 SOLUTION, ORAL ORAL ONCE
Status: COMPLETED | OUTPATIENT
Start: 2022-01-16 | End: 2022-01-16

## 2022-01-16 RX ORDER — ACETAMINOPHEN 500 MG
1000 TABLET ORAL EVERY 6 HOURS
Status: DISCONTINUED | OUTPATIENT
Start: 2022-01-17 | End: 2022-01-19 | Stop reason: HOSPADM

## 2022-01-16 RX ORDER — DEXAMETHASONE SODIUM PHOSPHATE 4 MG/ML
INJECTION, SOLUTION INTRA-ARTICULAR; INTRALESIONAL; INTRAMUSCULAR; INTRAVENOUS; SOFT TISSUE PRN
Status: DISCONTINUED | OUTPATIENT
Start: 2022-01-16 | End: 2022-01-16 | Stop reason: SURG

## 2022-01-16 RX ORDER — MORPHINE SULFATE 0.5 MG/ML
INJECTION, SOLUTION EPIDURAL; INTRATHECAL; INTRAVENOUS PRN
Status: DISCONTINUED | OUTPATIENT
Start: 2022-01-16 | End: 2022-01-16 | Stop reason: SURG

## 2022-01-16 RX ORDER — CEFAZOLIN SODIUM 1 G/3ML
INJECTION, POWDER, FOR SOLUTION INTRAMUSCULAR; INTRAVENOUS PRN
Status: DISCONTINUED | OUTPATIENT
Start: 2022-01-16 | End: 2022-01-16 | Stop reason: SURG

## 2022-01-16 RX ORDER — DIPHENHYDRAMINE HYDROCHLORIDE 50 MG/ML
12.5 INJECTION INTRAMUSCULAR; INTRAVENOUS EVERY 6 HOURS PRN
Status: ACTIVE | OUTPATIENT
Start: 2022-01-16 | End: 2022-01-17

## 2022-01-16 RX ORDER — SODIUM CHLORIDE, SODIUM GLUCONATE, SODIUM ACETATE, POTASSIUM CHLORIDE AND MAGNESIUM CHLORIDE 526; 502; 368; 37; 30 MG/100ML; MG/100ML; MG/100ML; MG/100ML; MG/100ML
1500 INJECTION, SOLUTION INTRAVENOUS ONCE
Status: COMPLETED | OUTPATIENT
Start: 2022-01-16 | End: 2022-01-16

## 2022-01-16 RX ORDER — IBUPROFEN 800 MG/1
800 TABLET ORAL EVERY 8 HOURS PRN
Status: DISCONTINUED | OUTPATIENT
Start: 2022-01-20 | End: 2022-01-17

## 2022-01-16 RX ORDER — DIPHENHYDRAMINE HYDROCHLORIDE 50 MG/ML
25 INJECTION INTRAMUSCULAR; INTRAVENOUS EVERY 6 HOURS PRN
Status: DISCONTINUED | OUTPATIENT
Start: 2022-01-17 | End: 2022-01-19 | Stop reason: HOSPADM

## 2022-01-16 RX ORDER — DOCUSATE SODIUM 100 MG/1
100 CAPSULE, LIQUID FILLED ORAL 2 TIMES DAILY PRN
Status: DISCONTINUED | OUTPATIENT
Start: 2022-01-16 | End: 2022-01-19 | Stop reason: HOSPADM

## 2022-01-16 RX ORDER — HYDROMORPHONE HYDROCHLORIDE 1 MG/ML
0.2 INJECTION, SOLUTION INTRAMUSCULAR; INTRAVENOUS; SUBCUTANEOUS
Status: ACTIVE | OUTPATIENT
Start: 2022-01-16 | End: 2022-01-17

## 2022-01-16 RX ORDER — IBUPROFEN 800 MG/1
800 TABLET ORAL EVERY 8 HOURS
Status: DISCONTINUED | OUTPATIENT
Start: 2022-01-17 | End: 2022-01-17

## 2022-01-16 RX ORDER — MIDAZOLAM HYDROCHLORIDE 1 MG/ML
INJECTION INTRAMUSCULAR; INTRAVENOUS PRN
Status: DISCONTINUED | OUTPATIENT
Start: 2022-01-16 | End: 2022-01-16 | Stop reason: SURG

## 2022-01-16 RX ORDER — ONDANSETRON 2 MG/ML
INJECTION INTRAMUSCULAR; INTRAVENOUS PRN
Status: DISCONTINUED | OUTPATIENT
Start: 2022-01-16 | End: 2022-01-16 | Stop reason: SURG

## 2022-01-16 RX ORDER — SODIUM CHLORIDE, SODIUM GLUCONATE, SODIUM ACETATE, POTASSIUM CHLORIDE AND MAGNESIUM CHLORIDE 526; 502; 368; 37; 30 MG/100ML; MG/100ML; MG/100ML; MG/100ML; MG/100ML
INJECTION, SOLUTION INTRAVENOUS
Status: DISCONTINUED | OUTPATIENT
Start: 2022-01-16 | End: 2022-01-16 | Stop reason: SURG

## 2022-01-16 RX ORDER — KETOROLAC TROMETHAMINE 30 MG/ML
INJECTION, SOLUTION INTRAMUSCULAR; INTRAVENOUS PRN
Status: DISCONTINUED | OUTPATIENT
Start: 2022-01-16 | End: 2022-01-16 | Stop reason: SURG

## 2022-01-16 RX ORDER — CEFAZOLIN SODIUM 1 G/3ML
2 INJECTION, POWDER, FOR SOLUTION INTRAMUSCULAR; INTRAVENOUS ONCE
Status: DISCONTINUED | OUTPATIENT
Start: 2022-01-16 | End: 2022-01-16 | Stop reason: HOSPADM

## 2022-01-16 RX ORDER — ONDANSETRON 2 MG/ML
4 INJECTION INTRAMUSCULAR; INTRAVENOUS EVERY 6 HOURS PRN
Status: DISCONTINUED | OUTPATIENT
Start: 2022-01-17 | End: 2022-01-19 | Stop reason: HOSPADM

## 2022-01-16 RX ORDER — ACETAMINOPHEN 500 MG
1000 TABLET ORAL EVERY 6 HOURS
Status: COMPLETED | OUTPATIENT
Start: 2022-01-16 | End: 2022-01-17

## 2022-01-16 RX ORDER — SODIUM CHLORIDE, SODIUM LACTATE, POTASSIUM CHLORIDE, CALCIUM CHLORIDE 600; 310; 30; 20 MG/100ML; MG/100ML; MG/100ML; MG/100ML
INJECTION, SOLUTION INTRAVENOUS ONCE
Status: DISCONTINUED | OUTPATIENT
Start: 2022-01-16 | End: 2022-01-16

## 2022-01-16 RX ORDER — METOCLOPRAMIDE HYDROCHLORIDE 5 MG/ML
10 INJECTION INTRAMUSCULAR; INTRAVENOUS ONCE
Status: COMPLETED | OUTPATIENT
Start: 2022-01-16 | End: 2022-01-16

## 2022-01-16 RX ORDER — OXYTOCIN 10 [USP'U]/ML
10 INJECTION, SOLUTION INTRAMUSCULAR; INTRAVENOUS
Status: DISCONTINUED | OUTPATIENT
Start: 2022-01-16 | End: 2022-01-16

## 2022-01-16 RX ORDER — OXYCODONE HYDROCHLORIDE 5 MG/1
5 TABLET ORAL EVERY 4 HOURS PRN
Status: DISPENSED | OUTPATIENT
Start: 2022-01-16 | End: 2022-01-17

## 2022-01-16 RX ORDER — VITAMIN A ACETATE, BETA CAROTENE, ASCORBIC ACID, CHOLECALCIFEROL, .ALPHA.-TOCOPHEROL ACETATE, DL-, THIAMINE MONONITRATE, RIBOFLAVIN, NIACINAMIDE, PYRIDOXINE HYDROCHLORIDE, FOLIC ACID, CYANOCOBALAMIN, CALCIUM CARBONATE, FERROUS FUMARATE, ZINC OXIDE, CUPRIC OXIDE 3080; 12; 120; 400; 1; 1.84; 3; 20; 22; 920; 25; 200; 27; 10; 2 [IU]/1; UG/1; MG/1; [IU]/1; MG/1; MG/1; MG/1; MG/1; MG/1; [IU]/1; MG/1; MG/1; MG/1; MG/1; MG/1
1 TABLET, FILM COATED ORAL
Status: DISCONTINUED | OUTPATIENT
Start: 2022-01-16 | End: 2022-01-19 | Stop reason: HOSPADM

## 2022-01-16 RX ORDER — DIPHENHYDRAMINE HCL 25 MG
25 TABLET ORAL EVERY 6 HOURS PRN
Status: DISCONTINUED | OUTPATIENT
Start: 2022-01-17 | End: 2022-01-19 | Stop reason: HOSPADM

## 2022-01-16 RX ORDER — HYDROMORPHONE HYDROCHLORIDE 1 MG/ML
0.4 INJECTION, SOLUTION INTRAMUSCULAR; INTRAVENOUS; SUBCUTANEOUS
Status: ACTIVE | OUTPATIENT
Start: 2022-01-16 | End: 2022-01-17

## 2022-01-16 RX ORDER — BUPIVACAINE HYDROCHLORIDE 7.5 MG/ML
INJECTION, SOLUTION INTRASPINAL PRN
Status: DISCONTINUED | OUTPATIENT
Start: 2022-01-16 | End: 2022-01-16 | Stop reason: SURG

## 2022-01-16 RX ORDER — OXYTOCIN 10 [USP'U]/ML
INJECTION, SOLUTION INTRAMUSCULAR; INTRAVENOUS PRN
Status: DISCONTINUED | OUTPATIENT
Start: 2022-01-16 | End: 2022-01-16 | Stop reason: SURG

## 2022-01-16 RX ORDER — SODIUM CHLORIDE, SODIUM LACTATE, POTASSIUM CHLORIDE, CALCIUM CHLORIDE 600; 310; 30; 20 MG/100ML; MG/100ML; MG/100ML; MG/100ML
INJECTION, SOLUTION INTRAVENOUS CONTINUOUS
Status: DISCONTINUED | OUTPATIENT
Start: 2022-01-16 | End: 2022-01-16

## 2022-01-16 RX ORDER — OXYCODONE HYDROCHLORIDE 5 MG/1
5 TABLET ORAL EVERY 4 HOURS PRN
Status: DISCONTINUED | OUTPATIENT
Start: 2022-01-17 | End: 2022-01-19 | Stop reason: HOSPADM

## 2022-01-16 RX ORDER — PHENYLEPHRINE HCL IN 0.9% NACL 0.5 MG/5ML
SYRINGE (ML) INTRAVENOUS PRN
Status: DISCONTINUED | OUTPATIENT
Start: 2022-01-16 | End: 2022-01-16 | Stop reason: SURG

## 2022-01-16 RX ORDER — SODIUM CHLORIDE, SODIUM LACTATE, POTASSIUM CHLORIDE, CALCIUM CHLORIDE 600; 310; 30; 20 MG/100ML; MG/100ML; MG/100ML; MG/100ML
INJECTION, SOLUTION INTRAVENOUS PRN
Status: DISCONTINUED | OUTPATIENT
Start: 2022-01-16 | End: 2022-01-19 | Stop reason: HOSPADM

## 2022-01-16 RX ORDER — ONDANSETRON 4 MG/1
4 TABLET, ORALLY DISINTEGRATING ORAL EVERY 6 HOURS PRN
Status: DISCONTINUED | OUTPATIENT
Start: 2022-01-17 | End: 2022-01-19 | Stop reason: HOSPADM

## 2022-01-16 RX ORDER — OXYCODONE HYDROCHLORIDE 10 MG/1
10 TABLET ORAL EVERY 4 HOURS PRN
Status: DISCONTINUED | OUTPATIENT
Start: 2022-01-17 | End: 2022-01-19 | Stop reason: HOSPADM

## 2022-01-16 RX ORDER — OXYCODONE HYDROCHLORIDE 10 MG/1
10 TABLET ORAL EVERY 4 HOURS PRN
Status: ACTIVE | OUTPATIENT
Start: 2022-01-16 | End: 2022-01-17

## 2022-01-16 RX ORDER — ONDANSETRON 2 MG/ML
4 INJECTION INTRAMUSCULAR; INTRAVENOUS EVERY 6 HOURS PRN
Status: DISPENSED | OUTPATIENT
Start: 2022-01-16 | End: 2022-01-17

## 2022-01-16 RX ORDER — ACETAMINOPHEN 500 MG
1000 TABLET ORAL EVERY 6 HOURS PRN
Status: DISCONTINUED | OUTPATIENT
Start: 2022-01-20 | End: 2022-01-19 | Stop reason: HOSPADM

## 2022-01-16 RX ORDER — KETOROLAC TROMETHAMINE 30 MG/ML
30 INJECTION, SOLUTION INTRAMUSCULAR; INTRAVENOUS EVERY 6 HOURS
Status: DISCONTINUED | OUTPATIENT
Start: 2022-01-16 | End: 2022-01-17

## 2022-01-16 RX ADMIN — OXYTOCIN 20 UNITS: 10 INJECTION, SOLUTION INTRAMUSCULAR; INTRAVENOUS at 13:10

## 2022-01-16 RX ADMIN — Medication 100 MCG: at 12:58

## 2022-01-16 RX ADMIN — FAMOTIDINE 20 MG: 10 INJECTION INTRAVENOUS at 12:21

## 2022-01-16 RX ADMIN — DEXAMETHASONE SODIUM PHOSPHATE 8 MG: 4 INJECTION, SOLUTION INTRA-ARTICULAR; INTRALESIONAL; INTRAMUSCULAR; INTRAVENOUS; SOFT TISSUE at 12:58

## 2022-01-16 RX ADMIN — KETOROLAC TROMETHAMINE 30 MG: 30 INJECTION, SOLUTION INTRAMUSCULAR at 13:29

## 2022-01-16 RX ADMIN — SODIUM CITRATE AND CITRIC ACID MONOHYDRATE 30 ML: 500; 334 SOLUTION ORAL at 12:20

## 2022-01-16 RX ADMIN — DIPHENHYDRAMINE HYDROCHLORIDE 12.5 MG: 50 INJECTION INTRAMUSCULAR; INTRAVENOUS at 16:24

## 2022-01-16 RX ADMIN — Medication 100 MCG: at 13:00

## 2022-01-16 RX ADMIN — CEFAZOLIN 2 G: 330 INJECTION, POWDER, FOR SOLUTION INTRAMUSCULAR; INTRAVENOUS at 12:51

## 2022-01-16 RX ADMIN — SODIUM CHLORIDE, SODIUM GLUCONATE, SODIUM ACETATE, POTASSIUM CHLORIDE AND MAGNESIUM CHLORIDE: 526; 502; 368; 37; 30 INJECTION, SOLUTION INTRAVENOUS at 12:37

## 2022-01-16 RX ADMIN — METOCLOPRAMIDE 10 MG: 5 INJECTION, SOLUTION INTRAMUSCULAR; INTRAVENOUS at 12:21

## 2022-01-16 RX ADMIN — SODIUM CHLORIDE, SODIUM GLUCONATE, SODIUM ACETATE, POTASSIUM CHLORIDE AND MAGNESIUM CHLORIDE 1500 ML: 526; 502; 368; 37; 30 INJECTION, SOLUTION INTRAVENOUS at 12:20

## 2022-01-16 RX ADMIN — ACETAMINOPHEN 1000 MG: 500 TABLET ORAL at 17:08

## 2022-01-16 RX ADMIN — FENTANYL CITRATE 15 MCG: 50 INJECTION, SOLUTION INTRAMUSCULAR; INTRAVENOUS at 12:47

## 2022-01-16 RX ADMIN — DOCUSATE SODIUM 100 MG: 100 CAPSULE ORAL at 17:08

## 2022-01-16 RX ADMIN — ONDANSETRON 4 MG: 2 INJECTION INTRAMUSCULAR; INTRAVENOUS at 13:12

## 2022-01-16 RX ADMIN — MORPHINE SULFATE 100 MCG: 0.5 INJECTION, SOLUTION EPIDURAL; INTRATHECAL; INTRAVENOUS at 12:47

## 2022-01-16 RX ADMIN — BUPIVACAINE HYDROCHLORIDE IN DEXTROSE 1.4 ML: 7.5 INJECTION, SOLUTION SUBARACHNOID at 12:47

## 2022-01-16 RX ADMIN — OXYTOCIN 20 UNITS: 10 INJECTION, SOLUTION INTRAMUSCULAR; INTRAVENOUS at 14:02

## 2022-01-16 RX ADMIN — KETOROLAC TROMETHAMINE 30 MG: 30 INJECTION, SOLUTION INTRAMUSCULAR; INTRAVENOUS at 19:45

## 2022-01-16 RX ADMIN — MIDAZOLAM HYDROCHLORIDE 1 MG: 1 INJECTION, SOLUTION INTRAMUSCULAR; INTRAVENOUS at 13:14

## 2022-01-16 RX ADMIN — SODIUM CHLORIDE, SODIUM GLUCONATE, SODIUM ACETATE, POTASSIUM CHLORIDE AND MAGNESIUM CHLORIDE: 526; 502; 368; 37; 30 INJECTION, SOLUTION INTRAVENOUS at 13:10

## 2022-01-16 ASSESSMENT — PAIN DESCRIPTION - PAIN TYPE
TYPE: ACUTE PAIN;SURGICAL PAIN

## 2022-01-16 ASSESSMENT — PAIN SCALES - GENERAL
PAIN_LEVEL: 4
PAINLEVEL: 0 - NO PAIN

## 2022-01-16 ASSESSMENT — LIFESTYLE VARIABLES
EVER HAD A DRINK FIRST THING IN THE MORNING TO STEADY YOUR NERVES TO GET RID OF A HANGOVER: NO
CONSUMPTION TOTAL: NEGATIVE
EVER FELT BAD OR GUILTY ABOUT YOUR DRINKING: NO
TOTAL SCORE: 0
HOW MANY TIMES IN THE PAST YEAR HAVE YOU HAD 5 OR MORE DRINKS IN A DAY: 0
EVER_SMOKED: NEVER
ON A TYPICAL DAY WHEN YOU DRINK ALCOHOL HOW MANY DRINKS DO YOU HAVE: 0
AVERAGE NUMBER OF DAYS PER WEEK YOU HAVE A DRINK CONTAINING ALCOHOL: 0
DOES PATIENT WANT TO STOP DRINKING: NO
HAVE PEOPLE ANNOYED YOU BY CRITICIZING YOUR DRINKING: NO
TOTAL SCORE: 0
TOTAL SCORE: 0
HAVE YOU EVER FELT YOU SHOULD CUT DOWN ON YOUR DRINKING: NO
ALCOHOL_USE: NO

## 2022-01-16 ASSESSMENT — FIBROSIS 4 INDEX: FIB4 SCORE: 0.71

## 2022-01-16 NOTE — PROGRESS NOTES
1025 37yo, , edc 22, pt presents for scheduled repeat  section. Pt denies LOF, vag bleeding. POS fm. EFM and Hayfield placed. VSS.    1400 Pt transferred via gurney with infant in arms to postpartum s314. Report given to Georgina PATRICIO at bedside.

## 2022-01-16 NOTE — H&P
Angelina Rader  YOB: 1985  Date of today's procedure: 2022  Facility: [facility] Labor [facility] Delivery    ID: The patient is a very pleasant 36-year-old multipara (para-4, with 2 vaginal deliveries followed more recently by 2  sections) who is today 39 weeks and 0 days gestation.    Chief Complaint: The patient has no complaints other than for generalized discomfort consistent with current pregnancy.    History of Present Illness: The patient has had prenatal care with myself in the course of this pregnancy. She has also been seen during the course of this pregnancy by perinatology, namely [facility]. Although she had stated earlier during the course of her pregnancy that she was interested in having vaginal birth after previous  section more recently she has decided to be delivered by a repeat  section. She scheduled today for repeat  section. Of note, she was seen recently by perinatology asked Dr. Khang Ross at [facility] and ultrasound revealed an estimated fetal weight greater than the 97th percentile. She is scheduled today to have a repeat  section. When I checked her cervix five days ago her cervix was found to be very posterior but it was felt that her cervix was not favorable.    Past Medical History: The patient has no medical illnesses.    Past Surgical History: The patient has had 2 previous  sections.    Medications: The patient takes no medications currently other than for prenatal vitamins.    Allergies: The patient says that she has no known drug allergies.    Social History: The patient denies smoking. She denies consuming alcoholic beverages. She denies the use of recreational drugs.    Review of Systems  General: The patient denies any fevers, chills, sweats.  Pulmonary: The patient denies any coughing, wheezing, chest pain, shortness of breath.  Cardiovascular: The  patient denies any palpitations, dyspnea, chest pain.  Gastrointestinal: The patient denies any nausea, vomiting, diarrhea, constipation, hematochezia, melena, history of hepatitis, history of jaundice.  Genitourinary: The patient says that she continues to feel fetal movement throughout the day every day. She denies any frequent painful uterine contractions. She denies any vaginal bleeding. She denies any leakage of fluid per vagina.  Musculoskeletal: The patient denies any arthralgias or myalgias other than for hip pain and low back pain.   Neurological: No headaches or syncope or seizures.     Physical Exam:   Vital Signs: The patient's vital signs are stable and she is afebrile.  General: The patient appears well developed and well nourished and relaxed and alert and comfortable and in no apparent distress.    HEENT :  Normo-cephalic, atraumatic, pupils equal, round, reactive to light and accommodation, extra ocular motions intact, pharynx clear; there is no thyromegaly. There is no cervical lymphadenopathy.  Chest: Heart regular rate and rhythm, with no murmurs or rubs or gallops; the lungs are clear to auscultation bilaterally.  Abdomen: The abdomen is gravid and fundal height measurement is 48 centimeters. There is a faint instills,.   Pelvic: The cervix is very posterior.  Extremities: No clubbing or cyanosis or edema.   Neurological: non-focal.     Assessment:   1.) Intrauterine pregnancy 39 weeks gestation.  2.) Previous  section times two in the patient wishes to be delivered by repeat  section.  3.) Fetal macrosomia.    Plan:   We will proceed today with repeat  section. Of note the patient says that she does not wish to have a tubal ligation or tubal sterilization procedure performed at the time of repeat  section. I have discussed with the patient and explained to the patient in detail and at length what repeat  section is and what repeat  section  involves along with the risks and benefits and alternatives of repeat  section. After our discussions and after answering her question she told me that she very much wishes for us to proceed with repeat  section.            ________________________  Regino Cook M.D.

## 2022-01-16 NOTE — OR SURGEON
Immediate Post OP Note    PreOp Diagnosis:   1.) Intrauterine pregnancy 39 weeks gestation.  2.) Previous  section times two in the patient wishes to be delivered by repeat  section.  3.) Fetal macrosomia.    PostOp Diagnosis:   1.) Intrauterine pregnancy 39 weeks gestation.  2.) Previous  section times two in the patient wishes to be delivered by repeat  section.  3.) Fetal macrosomia.  4.) Live term male  with Apgar scores of 8 and 9 at one and five minutes respectively and a  weight of 4,590 grams.     Procedure(s):   SECTION, REPEAT    Surgeon(s):  MONSTER Wiseman M.D.    Anesthesiologist/Type of Anesthesia:  Anesthesiologist: Walter Leslie M.D./ spinal     Surgical Staff:  Circulator: Mei Pena R.N.  Scrub Person: Debbie REYES&JUAN Circulator Assistant: Hermilo Melgoza R.N.  L&JUAN Baby  Nurse: Ericka Shook R.N.    Specimens removed if any:  None.     Estimated Blood Loss:   About 600 cc's.     Findings:   1.) Live term male  with Apgar scores of 8 and 9 at one and five minutes respectively and a  weight of 4,590 grams.  2.) Normal uterus and normal fallopian tubes bilaterally and normal ovaries bilaterally.    Complications:   None.        2022 2:22 PM Regino Cook M.D.

## 2022-01-16 NOTE — ANESTHESIA PROCEDURE NOTES
Spinal Block    Date/Time: 1/16/2022 12:47 PM  Performed by: Walter Leslie M.D.  Authorized by: Walter Leslie M.D.     Start Time:  1/16/2022 12:47 PM  Reason for Block: primary anesthetic    patient identified, IV checked, site marked, risks and benefits discussed, surgical consent, monitors and equipment checked, pre-op evaluation and timeout performed    Patient Position:  Sitting  Prep: ChloraPrep, patient draped and sterile technique    Monitoring:  Blood pressure, continuous pulse oximetry and heart rate  Approach:  Midline  Location:  L3-4  Injection Technique:  Single-shot  Skin infiltration:  Lidocaine  Strength:  1%  Dose:  3ml  Needle Type:  Pencan  Needle Gauge:  25 G  CSF flowing pre/post injection:  Yes  Sensory Level:  T4

## 2022-01-16 NOTE — PROGRESS NOTES
Patient received from L&D via vocaltap to room S314. Report received from Nadeen, RN and assumed care of pt. Patient oriented to room and surroundings, call system, mask, and visiting policy. Education given on infant security including ID bands, not letting anyone take the infant without photo ID, safe sleeping, no carrying infant in the hallways, and not leaving infant unattended. 0-10 pain scale and pain management discussed. Patient instructed to call for assistance before getting out of bed until stable. Assessment completed, POC discussed, and rounding in place.

## 2022-01-16 NOTE — ANESTHESIA TIME REPORT
Anesthesia Start and Stop Event Times     Date Time Event    2022 1131 Ready for Procedure     1237 Anesthesia Start     1357 Anesthesia Stop        Responsible Staff  22    Name Role Begin End    Walter Leslie M.D. Anesth 1237 1357        Preop Diagnosis (Free Text):  Pre-op Diagnosis     IUP 39.0 Scheduled Repeat  Section        Preop Diagnosis (Codes):  Diagnosis Information     Diagnosis Code(s): Indication for care in labor or delivery [O75.9]        Premium Reason  E. Weekend    Comments:

## 2022-01-16 NOTE — ANESTHESIA PREPROCEDURE EVALUATION
Case: [id number] Date/Time: 22 1145    Procedure:  SECTION, REPEAT    Pre-op diagnosis:       PREVIOUS  SECTION      39 WEEKS    Location: [facility] OR 01 / SURGERY LABOR AND DELIVERY    Surgeons: Regino Cook M.D.          Relevant Problems   OB   (positive) Previous  section   (positive) Previous  section complicating pregnancy     Panic attack after prior spinal due to feeling of numbness.       Physical Exam    Airway   Mallampati: II  TM distance: >3 FB  Neck ROM: full       Cardiovascular - normal exam  Rhythm: regular  Rate: normal  (-) murmur     Dental - normal exam           Pulmonary - normal exam  Breath sounds clear to auscultation     Abdominal    Neurological - normal exam                 Anesthesia Plan    ASA 2       Plan - spinal   Neuraxial block will be primary anesthetic                Postoperative Plan: Postoperative administration of opioids is intended.    Pertinent diagnostic labs and testing reviewed    Informed Consent:    Anesthetic plan and risks discussed with patient.

## 2022-01-17 LAB
ERYTHROCYTE [DISTWIDTH] IN BLOOD BY AUTOMATED COUNT: 49.4 FL (ref 35.9–50)
GLUCOSE BLD-MCNC: 111 MG/DL (ref 65–99)
HCT VFR BLD AUTO: 28.4 % (ref 37–47)
HGB BLD-MCNC: 8.8 G/DL (ref 12–16)
MCH RBC QN AUTO: 25.8 PG (ref 27–33)
MCHC RBC AUTO-ENTMCNC: 31 G/DL (ref 33.6–35)
MCV RBC AUTO: 83.3 FL (ref 81.4–97.8)
PLATELET # BLD AUTO: 215 K/UL (ref 164–446)
PMV BLD AUTO: 9.6 FL (ref 9–12.9)
RBC # BLD AUTO: 3.41 M/UL (ref 4.2–5.4)
WBC # BLD AUTO: 11.8 K/UL (ref 4.8–10.8)

## 2022-01-17 PROCEDURE — A9270 NON-COVERED ITEM OR SERVICE: HCPCS | Performed by: ANESTHESIOLOGY

## 2022-01-17 PROCEDURE — 770002 HCHG ROOM/CARE - OB PRIVATE (112)

## 2022-01-17 PROCEDURE — 82962 GLUCOSE BLOOD TEST: CPT

## 2022-01-17 PROCEDURE — 700102 HCHG RX REV CODE 250 W/ 637 OVERRIDE(OP): Performed by: SPECIALIST

## 2022-01-17 PROCEDURE — 85027 COMPLETE CBC AUTOMATED: CPT

## 2022-01-17 PROCEDURE — 700111 HCHG RX REV CODE 636 W/ 250 OVERRIDE (IP): Performed by: ANESTHESIOLOGY

## 2022-01-17 PROCEDURE — 700102 HCHG RX REV CODE 250 W/ 637 OVERRIDE(OP): Performed by: ANESTHESIOLOGY

## 2022-01-17 PROCEDURE — 36415 COLL VENOUS BLD VENIPUNCTURE: CPT

## 2022-01-17 PROCEDURE — A9270 NON-COVERED ITEM OR SERVICE: HCPCS | Performed by: SPECIALIST

## 2022-01-17 RX ORDER — IBUPROFEN 800 MG/1
800 TABLET ORAL EVERY 8 HOURS
Status: DISCONTINUED | OUTPATIENT
Start: 2022-01-17 | End: 2022-01-19 | Stop reason: HOSPADM

## 2022-01-17 RX ORDER — IBUPROFEN 800 MG/1
800 TABLET ORAL EVERY 8 HOURS PRN
Status: DISCONTINUED | OUTPATIENT
Start: 2022-01-20 | End: 2022-01-19 | Stop reason: HOSPADM

## 2022-01-17 RX ADMIN — PRENATAL WITH FERROUS FUM AND FOLIC ACID 1 TABLET: 3080; 920; 120; 400; 22; 1.84; 3; 20; 10; 1; 12; 200; 27; 25; 2 TABLET ORAL at 10:37

## 2022-01-17 RX ADMIN — KETOROLAC TROMETHAMINE 30 MG: 30 INJECTION, SOLUTION INTRAMUSCULAR; INTRAVENOUS at 10:36

## 2022-01-17 RX ADMIN — OXYCODONE HYDROCHLORIDE 10 MG: 10 TABLET ORAL at 18:34

## 2022-01-17 RX ADMIN — ACETAMINOPHEN 1000 MG: 500 TABLET ORAL at 01:37

## 2022-01-17 RX ADMIN — DOCUSATE SODIUM 100 MG: 100 CAPSULE ORAL at 10:37

## 2022-01-17 RX ADMIN — ACETAMINOPHEN 1000 MG: 500 TABLET ORAL at 17:44

## 2022-01-17 RX ADMIN — ACETAMINOPHEN 1000 MG: 500 TABLET ORAL at 10:37

## 2022-01-17 RX ADMIN — OXYCODONE 5 MG: 5 TABLET ORAL at 10:37

## 2022-01-17 RX ADMIN — KETOROLAC TROMETHAMINE 30 MG: 30 INJECTION, SOLUTION INTRAMUSCULAR; INTRAVENOUS at 01:38

## 2022-01-17 RX ADMIN — IBUPROFEN 800 MG: 800 TABLET, FILM COATED ORAL at 17:44

## 2022-01-17 ASSESSMENT — PAIN DESCRIPTION - PAIN TYPE
TYPE: ACUTE PAIN;SURGICAL PAIN

## 2022-01-17 NOTE — ANESTHESIA POSTPROCEDURE EVALUATION
Patient: Angelina Rader    Procedure Summary     Date: 22 Room / Location: [facility] OR 01 / SURGERY LABOR AND DELIVERY    Anesthesia Start: 1237 Anesthesia Stop: 1357    Procedure:  SECTION, REPEAT (Bilateral ) Diagnosis:       Indication for care in labor or delivery      (IUP 39.0 Scheduled Repeat  Section)    Surgeons: Regino Cook M.D. Responsible Provider: Walter Leslie M.D.    Anesthesia Type: spinal ASA Status: 2          Final Anesthesia Type: spinal  Last vitals  BP   Blood Pressure: 117/70    Temp   36.8 °C (98.2 °F)    Pulse   (!) 103   Resp   16    SpO2   91 %      Anesthesia Post Evaluation    Patient location during evaluation: PACU  Patient participation: complete - patient participated  Level of consciousness: awake and alert  Pain score: 4    Airway patency: patent  Anesthetic complications: no  Cardiovascular status: hemodynamically stable  Respiratory status: acceptable  Hydration status: euvolemic    PONV: none          No complications documented.     Nurse Pain Score: 4 (NPRS)

## 2022-01-17 NOTE — PROGRESS NOTES
"2200 CNA stated that when she entered room she found MOB crying with FOB sitting on couch. CNA asked her if she needed anything and if she was okay and MOB declined. RN entered room shortly after and MOB sitting up in bed on her phone. FOB laying down at this time, turned away. Asked if she was okay and needed any assistance or wanted to talk. She declined. Will continue to monitor patient and offer assistance.     0120 This RN in nourishment room grabbing MOB some water. RN and Patricia RN at [facility] heard a scream. Per Patricia RN, who was at the nurses's station, patient was yelling \"that's my money\". We both entered her room and found MOB with baby in her arms standing near ALISSON who was packing his bags by the couch. FOB then seen giving MOB some cash. FOB appeared clearly upset. MOB stated she was fine. She was asking for her phone, as she can't find it. FOB stated he does not have it. When she asked him for her phone again, he stated \"fuck you\". After grabbing all his belongings, FORYLAN stormed out of room and stated \"that's not my baby\".     Door closed, MOB placed infant in open crib. RN asked if she was okay and that we had heard a scream. She stated that when FORYLAN became upset and starting grabbing his things that she got out of bed and the gomes catheter was pulled taut. RN assisted by swaddling infant. She stated she was fine and was not hurt at all. RN assessed patient's safety, and patient stated \"I'm fine, he's leaving. If he leaves he's not coming back because he doesn't live here\". RN explained to patient that for her safety we can call security and have it to where he is not allowed to return. MOB stated she was fine and that isn't required. She stated to this RN that \"he is just being stubborn, he doesn't live here. He was fine with the baby, he just has a problem with me\". RN sat at the bedside to talk to patient. She made RN aware that FOB was getting upset because she was asking for help. Per MOB, he was " "calling her lazy and getting upset because it was hard for her to get out of bed. Reassured MOB that she is not being lazy and that she can call RNs/CNAs for assistance any time she needs it. RN stayed with patient for awhile talking, she appeared okay and was making conversation not concerning fight with FOB.   Due to concerns, social consult placed.     0545  FOB back at the , stated to  that he has spoken to MOB and she said he could come back. RN to [facility] room to confirm. Per MOB, she said he can come back. Per patient \"he's just been bothering me, but its fine. He also has my phone\". MOB stated she will call and/or pull call light if assistance if needed.     0542  Call light on. On arrival to room MOB stated that he keeps fighting with her and isnt being civil. FOB on couch and stated he came back to see his child. At the same time was accusing MOB of having an affair and that baby may not be his. Parents continued to argue back and forth. Charge RN notified and security was called. FOB then left the room prior to security entering.     0600 MOB stated she does not want FOB in room. He may visit child as she does not find him a danger to the baby. Can only visit baby if in the nursery per MOB. Password set, MOB declines being moved to another room.   "

## 2022-01-17 NOTE — CARE PLAN
The patient is Stable - Low risk of patient condition declining or worsening    Shift Goals  Clinical Goals: VSS; pain management     Progress made toward(s) clinical / shift goals:  VSS     Problem: Knowledge Deficit - Postpartum  Goal: Patient will verbalize and demonstrate understanding of self and infant care  Outcome: Progressing  NOTE: Patient and partner oriented to the PP unit. POC, safety, pain scale, and pain control discussed. Education given on  care to include importance of swaddling/dressing , diapering, feeding frequency, slow paced bottle feeding, I&O clipboard, safe sleep, and care for the non-circumcised . Education given on PP care to include normal v. Abnormal bleeding, fundal assessment, gomes catheter, IV/IV medications, pulse oximeter, duramorph medication, ambulation, and diet. All questions answered. Call light within reach and instruction given on use, including emergency call feature.      Problem: Infection - Postpartum  Goal: Postpartum patient will be free of signs and symptoms of infection  Outcome: Progressing  NOTE: Patient is physiologically stable as evidenced by scant to light lochia rubra, firm fundus one fingerbreadth below the umbilicus, and vital signs WDL. Will continue to monitor patient condition.         Patient is not progressing towards the following goals: NA

## 2022-01-17 NOTE — PROGRESS NOTES
0700: Bedside report completed with MAE Ordaz RN and assumed care of pt. Bed locked and in lowest position with personal belongings and call light within reach. Pt reports all needs met at this time.     0730: 12 hour chart check completed. Orders/MAR reviewed.     1050: Patient assessment completed. Discussed pain management plan and patient requests medications be provided as scheduled/available. Patient denies dizziness and headaches; states she is voiding w/o difficulty. Pt reports she has not passed flatus after surgery. Abd is soft, and non-tender to palpation. BS normoactive x 4 quads. Colace and PO fluids encouraged. Discussed importance of ambulation and pt encouraged to walk in the hallways at least four times per shift. Reviewed plan of care, all questions answered, and rounding in place.

## 2022-01-17 NOTE — PROGRESS NOTES
Assumed care of patient. Assessment complete. Fundus is firm, with scant to light lochia rubra. Pain level is 7/10, medicated per MAR. PRN and scheduled meds discussed, patient verbalized understanding. Has been a bit nauseas today, offered zofran and patient declined at this time. Bed is locked and in low position. Call light left within reach and encouraged to call for any needs if necessary.

## 2022-01-17 NOTE — OP REPORT
DATE OF SERVICE:  2022     PREOPERATIVE DIAGNOSES:  1.  Intrauterine pregnancy at 39 weeks and 0 days gestation.  2.  Previous  section x2 and the patient wishes to be delivered by a   repeat  section.  3.  Fetal macrosomia.     POSTOPERATIVE DIAGNOSES:  1.  Intrauterine pregnancy at 39 weeks and 0 days gestation.  2.  Previous  section x2 and the patient wishes to be delivered by a   repeat  section.  3.  Fetal macrosomia.  4.  Live term male  with Apgar scores of 8 and 9 at 1 and 5 minutes   respectively and a  weight of 4590 grams.     PROCEDURE:  Repeat low transverse  section.     SURGEON:  Regino Cook MD     ASSISTANT:  Carlyn Weathers MD     ANESTHESIA:  Spinal.     ANESTHESIOLOGIST:  Walter Leslie MD     FINDINGS:  1.  Live term male  with Apgar scores of 8 and 9 at 1 and 5 minutes   respectively and a  weight of 4590 grams.  2.  Normal uterus and normal fallopian tubes bilaterally, and normal ovaries   bilaterally.     SPECIMEN:  None.     COMPLICATIONS:  None.     ESTIMATED BLOOD LOSS:  Approximately 600 mL.     DESCRIPTION OF PROCEDURE:  After appropriate consents have been obtained, the   patient was taken to the operating room and given a spinal anesthesia.  She   was prepped and draped in the dorsal supine position and a Terry catheter was   noted to be in place and draining urine.  Anesthesia was tested and noted to   be excellent.  The lower abdominal horizontal surgical scar (Pfannenstiel   scar) was identified and an incision was made through this scar (Pfannenstiel   incision) using a scalpel.  This incision was continued deeply through   subcutaneous tissues using Bovie electrocautery and hemostasis was maintained   with Bovie electrocautery.  The anterior rectus fascia was identified and   incised transversely with Bovie electrocautery and this incision was extended   bilaterally with Bovie electrocautery.   The superior aspect of the fascial   incision was doubly grasped with Kocher clamps and undermined from the   underlying rectus muscle with both blunt dissection and Bovie electrocautery.    Next, the inferior aspect of the fascial incision was similarly doubly   grasped with Kocher clamps and undermined from the underlying rectus muscle   with both blunt dissection and Bovie electrocautery.  The midline of the   rectus muscle was identified and separation was created and developed in the   midline of the rectus muscle with blunt dissection using Dior clamps and   during this process, the parietal peritoneum was entered.  The entry through   the midline of the rectus muscle was continued superiorly and inferiorly with   Bovie electrocautery and the entry in the parietal peritoneum was continued   superiorly and inferiorly with Bovie electrocautery with care taken to avoid   the bladder.  An extra-large Brett O self-retaining retractor was inserted   and set up in the usual fashion after the peritoneal cavity and the anterior   aspect of the uterus had been palpated to ensure the absence of adhesions.    This extra-large Brett O self-retaining retractor was inserted and set up in   the usual fashion and found to provide excellent exposure to the anterior   lower uterine segment.  The anterior lower uterine segment was found to be   very thin.  The anterior lower uterine segment was very gently and   superficially incised with a scalpel and bulging membranes were seen and the   hysterotomy was extended bilaterally with blunt dissection and membranes were   ruptured and abundant clear amniotic fluid was observed and the hysterotomy   was further extended bilaterally with blunt dissection.  A hand was placed in   the intrauterine cavity around the baby's head and fundal pressure was used to   deliver baby's head and then baby's body.  Baby was found to be very large.    Baby's nasopharynx was suctioned with a bulb  syringe.  After observing 30   seconds of delayed cord clamping, the umbilical cord was doubly clamped and   cut and the baby was handed to the awaiting nursery team.  A segment of   umbilical cord was set aside for possible cord gases, which eventually were   not obtained because of the Apgar scores were 8 and 9 at 1 and 5 minutes   respectively.  The placenta was manually delivered.  The uterus was not   exteriorized.  The interior of the uterus was wiped clean of products of   conception.  The cervix was dilated with a pair of ring forceps.  This pair of   ring forceps was then discarded.  The hysterotomy was reapproximated first   with placement of no less than 3 interrupted mattress sutures of #1 chromic   and a continuous interlocking suture of #1 chromic.  The entire length of the   hysterotomy repair was examined and hemostasis was noted to be excellent.  The   left adnexa was exposed, examined and found to be normal.  The right adnexa   was exposed and examined and found to be normal.  Of note, the uterus was not   exteriorized during this procedure. The uterus was palpated and found to be   firm.  The Brett O self-retaining retractor was removed.  Lap and needle   counts reported to be correct at this time.  The parietal peritoneum was   identified and reapproximated with simple continuous suture using 3-0 Vicryl.    The rectus muscles were reapproximated in the midline with placement of   multiple interrupted mattress sutures of 2-0 chromic.  Hemostasis was noted to   be excellent.  The anterior rectus fascia was identified and reapproximated   with simple continuous suture using #1 Vicryl.  The wound was copiously   irrigated and drained and hemostasis was noted to be excellent.  The   subcutaneous tissues were reapproximated with simple continuous suture using   3-0 Vicryl.  Finally, the skin was reapproximated with placement of many   interrupted buried sutures of 4-0 Monocryl placed in the dermis and  at least   one set sutures placed for every 1 cm of length along the entire length of the   skin incision and the skin incision was nicely reapproximated this way.  The   procedure was terminated.  Final lap and needle counts reported to be correct   x2 at the end of the procedure.  The patient tolerated the procedure well and   sent to postanesthesia recovery in stable condition.        ______________________________  Regino Cook MD    MED/GOK    DD:  01/17/2022 13:11  DT:  01/17/2022 13:30    Job#:  [id number]    CC:MD Walter Aguirre MD(User)  Khang Ross MD

## 2022-01-17 NOTE — DISCHARGE PLANNING
Discharge Planning Assessment Post Partum    Reason for Referral: Limited prenatal care and parents arguing in room  Address: [address] [place] [address] [place] [address]  Phone: [phone]  Type of Living Situation: living with children  Mom Diagnosis: Pregnancy,   Baby Diagnosis: [place]-39 weeks  Primary Language: English    Name of Baby: Richard Michaud (: 22)  Father of the Baby: Joseph Juarez   Involved in baby’s care? Yes  Contact Information: [phone]    Prenatal Care: Yes-limited care with Dr. Cook.  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  Other children in the home/names & ages: four other children; ages 13, 12, 6, and 2 years old    Financial Hardship/Income: No, plans to return to work in a couple months   Mom's Mental status: alert and oriented  Services used prior to admit: Medicaid, WIC, and food stamps    CPS History: No  Psychiatric History: No  Domestic Violence History: No-MOB denies any safety concerns/issues  Drug/ETOH History: No, infant's UDS is negative    Resources Provided: post partum support and counseling resources, children and family resource list  Referrals Made: diaper bank referral provided     Clearance for Discharge: Infant is cleared to discharge home with mother    Ongoing Plan: Discussed arguing that had occurred earlier with the FOB.  MOB denies any further concerns or issues and states that she and the FOB are fine.  MOB stated the FOB is here from [place] to be here  for the delivery and will be returning back to [place].  MOB states she has good support and help from her family.  Discussed that we are here to support and help her and to let us know if there is anything she needs.

## 2022-01-17 NOTE — CARE PLAN
The patient is Stable - Low risk of patient condition declining or worsening    Shift Goals  Clinical Goals: vitals WNL, pain control, bonding    Progress made toward(s) clinical / shift goals:  vitals stable. Pain controlled with rest and meds. Bonding appropriately.     Patient is not progressing towards the following goals:      Problem: Altered Physiologic Condition  Goal: Patient physiologically stable as evidenced by normal lochia, palpable uterine involution and vitals within normal limits  Outcome: Progressing  Note: Fundus firm, lochia remains scant to light. No clots. No trickling. Vitals stable. At times HR over 100, but not over 110.      Problem: Infection - Postpartum  Goal: Postpartum patient will be free of signs and symptoms of infection  Outcome: Progressing  Note: No s/s of infection. Afebrile.

## 2022-01-18 LAB — GLUCOSE BLD-MCNC: 84 MG/DL (ref 65–99)

## 2022-01-18 PROCEDURE — 700102 HCHG RX REV CODE 250 W/ 637 OVERRIDE(OP): Performed by: SPECIALIST

## 2022-01-18 PROCEDURE — 82962 GLUCOSE BLOOD TEST: CPT

## 2022-01-18 PROCEDURE — A9270 NON-COVERED ITEM OR SERVICE: HCPCS | Performed by: SPECIALIST

## 2022-01-18 PROCEDURE — 770002 HCHG ROOM/CARE - OB PRIVATE (112)

## 2022-01-18 RX ORDER — POLYETHYLENE GLYCOL 3350 17 G/17G
1 POWDER, FOR SOLUTION ORAL
Status: DISCONTINUED | OUTPATIENT
Start: 2022-01-18 | End: 2022-01-19 | Stop reason: HOSPADM

## 2022-01-18 RX ADMIN — ACETAMINOPHEN 1000 MG: 500 TABLET ORAL at 18:52

## 2022-01-18 RX ADMIN — IBUPROFEN 800 MG: 800 TABLET, FILM COATED ORAL at 18:11

## 2022-01-18 RX ADMIN — DOCUSATE SODIUM 100 MG: 100 CAPSULE ORAL at 12:41

## 2022-01-18 RX ADMIN — ACETAMINOPHEN 1000 MG: 500 TABLET ORAL at 00:12

## 2022-01-18 RX ADMIN — POLYETHYLENE GLYCOL 3350 1 PACKET: 17 POWDER, FOR SOLUTION ORAL at 17:35

## 2022-01-18 RX ADMIN — IBUPROFEN 800 MG: 800 TABLET, FILM COATED ORAL at 09:45

## 2022-01-18 RX ADMIN — OXYCODONE HYDROCHLORIDE 10 MG: 10 TABLET ORAL at 18:12

## 2022-01-18 RX ADMIN — ACETAMINOPHEN 1000 MG: 500 TABLET ORAL at 12:41

## 2022-01-18 RX ADMIN — PRENATAL WITH FERROUS FUM AND FOLIC ACID 1 TABLET: 3080; 920; 120; 400; 22; 1.84; 3; 20; 10; 1; 12; 200; 27; 25; 2 TABLET ORAL at 09:02

## 2022-01-18 RX ADMIN — OXYCODONE 5 MG: 5 TABLET ORAL at 00:15

## 2022-01-18 RX ADMIN — IBUPROFEN 800 MG: 800 TABLET, FILM COATED ORAL at 02:01

## 2022-01-18 RX ADMIN — OXYCODONE 5 MG: 5 TABLET ORAL at 09:02

## 2022-01-18 RX ADMIN — ACETAMINOPHEN 1000 MG: 500 TABLET ORAL at 06:09

## 2022-01-18 RX ADMIN — DOCUSATE SODIUM 100 MG: 100 CAPSULE ORAL at 00:13

## 2022-01-18 ASSESSMENT — PAIN DESCRIPTION - PAIN TYPE
TYPE: ACUTE PAIN
TYPE: ACUTE PAIN
TYPE: ACUTE PAIN;SURGICAL PAIN

## 2022-01-18 ASSESSMENT — EDINBURGH POSTNATAL DEPRESSION SCALE (EPDS)
I HAVE BEEN ANXIOUS OR WORRIED FOR NO GOOD REASON: NO, NOT AT ALL
I HAVE LOOKED FORWARD WITH ENJOYMENT TO THINGS: AS MUCH AS I EVER DID
I HAVE BEEN SO UNHAPPY THAT I HAVE BEEN CRYING: NO, NEVER
I HAVE BEEN SO UNHAPPY THAT I HAVE HAD DIFFICULTY SLEEPING: NOT AT ALL
THE THOUGHT OF HARMING MYSELF HAS OCCURRED TO ME: NEVER
I HAVE BLAMED MYSELF UNNECESSARILY WHEN THINGS WENT WRONG: NO, NEVER
I HAVE FELT SCARED OR PANICKY FOR NO GOOD REASON: NO, NOT AT ALL
I HAVE FELT SAD OR MISERABLE: NO, NOT AT ALL
THINGS HAVE BEEN GETTING ON TOP OF ME: NO, I HAVE BEEN COPING AS WELL AS EVER
I HAVE BEEN ABLE TO LAUGH AND SEE THE FUNNY SIDE OF THINGS: AS MUCH AS I ALWAYS COULD

## 2022-01-18 NOTE — CARE PLAN
The patient is Stable - Low risk of patient condition declining or worsening    Shift Goals  Clinical Goals: VSS; pain management     Progress made toward(s) clinical / shift goals:  VSS    Problem: Altered Physiologic Condition  Goal: Patient physiologically stable as evidenced by normal lochia, palpable uterine involution and vitals within normal limits  Outcome: Progressing  NOTE: Patient is physiologically stable as evidenced by scant lochia rubra, firm fundus one fingerbreadth below the umbilicus, and vital signs WDL. Will continue to monitor patient condition.        Problem: Infection - Postpartum  Goal: Postpartum patient will be free of signs and symptoms of infection  Outcome: Progressing  NOTE: Patient is afebrile and absent for other signs/symptoms of infection. Vital signs WDL.  Will continue to monitor patient condition.         Patient is not progressing towards the following goals: NA

## 2022-01-18 NOTE — PROGRESS NOTES
Assumed care of patient. Assessment complete. Fundus is firm, with light lochia rubra. Pain level is low, at a 1/10, denies pain medication at this time. Bed is locked and in low position. Call light left within reach and encouraged to call for any needs if necessary.

## 2022-01-18 NOTE — PROGRESS NOTES
0700: Bedside report completed with MAE Ordaz RN and assumed care of pt. Pt sleeping per NOC RN - do not disturb.     0730: 12 hour chart check completed. Orders/MAR reviewed.     0900: Patient assessment completed. Discussed pain management plan and patient requests medications be offered/provided as available. Patient denies dizziness and headaches; states she is voiding w/o difficulty and passing flatus. Reviewed plan of care, all questions answered, and rounding in place.

## 2022-01-18 NOTE — PROGRESS NOTES
POST OP DAY # 1  The patient says that she has abdominal soreness.   She says that she feels fine otherwise.   She says that she has been ambulating and urinating and tolerating oral fluids.   She says that her vaginal bleeding has been minimal.   The patient's vital signs have been stable and she has been afebrile.   She appears well developed and well nourished and relaxed and alert and comfortable and in no apparent distress.   Labs: The patient's hemoglobin went from 10.2 grams per deciliter pre-op to 8.8 grams per deciliter post-op   We will continue to have the patient ambulate and will continue to give analgesia prn.   Regino Cook M.D.

## 2022-01-18 NOTE — PROGRESS NOTES
POST OP DAY # 2 STATUS POST REPEAT  SECTION  The patient says that she does have abdominal soreness.   She tells me this morning that she has been ambulating and urinating and tolerating a regular diet and passing flatus. She says that she would like to stay until tomorrow.   Vital signs: The patient's vital signs are stable and she is afebrile.   General: The patient appears well developed and well nourished and relaxed ad alert and comfortable and in no apparent distress.   Labs: The patient's random FS glucose yesterday evening was 111 mg/dL and her morning fasting FS glucose was 84 mg/dL.   Assessment/plan:  The patient is today postoperative day #2 status post repeat  section.  She has had at least gestational diabetes given her recent hemoglobin A1c.  Also she is slightly anemic.  Her postoperative hemoglobin and hematocrit yesterday were 8.8 g/dL and 28.4% respectively.  We will continue ambulation and continue analgesia as needed and consider discharge home tomorrow.  Of note, I did explain to her yesterday that her hemoglobin A1c was greater than 7% and explained to her that this indicates that she has had diabetes and that this diabetes might be gestational diabetes and I discussed this with her.  I discussed with her the difference between type 2 diabetes and gestational diabetes.  I recommended to her that when she is about 8 weeks postpartum that we perform testing such as the 75 g 2-hour glucose tolerance test and she acknowledged this.  She tells me that she does have a family history of diabetes and that her father and her father's relatives all have diabetes.  She says her mother and her mother's family also has diabetes.  ________________________  Regino Cook M.D.
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
## 2022-01-18 NOTE — CARE PLAN
The patient is Stable - Low risk of patient condition declining or worsening    Shift Goals  Clinical Goals: vitals WNL, pain control, rest    Progress made toward(s) clinical / shift goals:  vitals remain stable. Pain interventions consists of medications scheduled and prn as well as rest.     Patient is not progressing towards the following goals:      Problem: Altered Physiologic Condition  Goal: Patient physiologically stable as evidenced by normal lochia, palpable uterine involution and vitals within normal limits  Outcome: Progressing  Note: Fundus firm, lochia light and rubra. Vitals remain stable. Abdominal binder in use.      Problem: Early Mobilization - Post Surgery  Goal: Early mobilization post surgery  Outcome: Progressing  Note: Patient has been very ambulatory since yesterday. Pain has increased today more so than yesterday. Medications in use.

## 2022-01-19 ENCOUNTER — PHARMACY VISIT (OUTPATIENT)
Dept: PHARMACY | Facility: MEDICAL CENTER | Age: 37
End: 2022-01-19
Payer: COMMERCIAL

## 2022-01-19 VITALS
TEMPERATURE: 97.4 F | HEIGHT: 65 IN | SYSTOLIC BLOOD PRESSURE: 114 MMHG | HEART RATE: 80 BPM | OXYGEN SATURATION: 92 % | DIASTOLIC BLOOD PRESSURE: 74 MMHG | WEIGHT: 222 LBS | BODY MASS INDEX: 36.99 KG/M2 | RESPIRATION RATE: 18 BRPM

## 2022-01-19 PROBLEM — O24.419 GESTATIONAL DIABETES: Status: ACTIVE | Noted: 2022-01-19

## 2022-01-19 PROBLEM — D62 ACUTE POSTHEMORRHAGIC ANEMIA: Status: ACTIVE | Noted: 2022-01-19

## 2022-01-19 PROCEDURE — RXMED WILLOW AMBULATORY MEDICATION CHARGE: Performed by: SPECIALIST

## 2022-01-19 PROCEDURE — A9270 NON-COVERED ITEM OR SERVICE: HCPCS | Performed by: SPECIALIST

## 2022-01-19 PROCEDURE — 700102 HCHG RX REV CODE 250 W/ 637 OVERRIDE(OP): Performed by: SPECIALIST

## 2022-01-19 RX ORDER — IBUPROFEN 800 MG/1
800 TABLET ORAL EVERY 8 HOURS PRN
Qty: 30 TABLET | Refills: 0 | Status: SHIPPED | OUTPATIENT
Start: 2022-01-19

## 2022-01-19 RX ORDER — OXYCODONE HYDROCHLORIDE AND ACETAMINOPHEN 5; 325 MG/1; MG/1
1 TABLET ORAL EVERY 6 HOURS PRN
Qty: 28 TABLET | Refills: 0 | Status: SHIPPED | OUTPATIENT
Start: 2022-01-19 | End: 2022-01-26

## 2022-01-19 RX ORDER — FERROUS SULFATE 325(65) MG
325 TABLET ORAL DAILY
Qty: 30 TABLET | Refills: 0 | Status: SHIPPED | OUTPATIENT
Start: 2022-01-19

## 2022-01-19 RX ORDER — DOCUSATE SODIUM 100 MG/1
100 CAPSULE, LIQUID FILLED ORAL 2 TIMES DAILY
Qty: 60 CAPSULE | Refills: 0 | Status: SHIPPED | OUTPATIENT
Start: 2022-01-19

## 2022-01-19 RX ADMIN — ACETAMINOPHEN 1000 MG: 500 TABLET ORAL at 00:37

## 2022-01-19 RX ADMIN — IBUPROFEN 800 MG: 800 TABLET, FILM COATED ORAL at 02:27

## 2022-01-19 RX ADMIN — ACETAMINOPHEN 1000 MG: 500 TABLET ORAL at 13:01

## 2022-01-19 RX ADMIN — PRENATAL WITH FERROUS FUM AND FOLIC ACID 1 TABLET: 3080; 920; 120; 400; 22; 1.84; 3; 20; 10; 1; 12; 200; 27; 25; 2 TABLET ORAL at 08:02

## 2022-01-19 RX ADMIN — ACETAMINOPHEN 1000 MG: 500 TABLET ORAL at 06:31

## 2022-01-19 RX ADMIN — DOCUSATE SODIUM 100 MG: 100 CAPSULE ORAL at 00:37

## 2022-01-19 RX ADMIN — DOCUSATE SODIUM 100 MG: 100 CAPSULE ORAL at 13:02

## 2022-01-19 RX ADMIN — IBUPROFEN 800 MG: 800 TABLET, FILM COATED ORAL at 10:06

## 2022-01-19 RX ADMIN — OXYCODONE HYDROCHLORIDE 10 MG: 10 TABLET ORAL at 00:37

## 2022-01-19 RX ADMIN — POLYETHYLENE GLYCOL 3350 1 PACKET: 17 POWDER, FOR SOLUTION ORAL at 08:12

## 2022-01-19 ASSESSMENT — PAIN DESCRIPTION - PAIN TYPE
TYPE: ACUTE PAIN;SURGICAL PAIN
TYPE: ACUTE PAIN;SURGICAL PAIN
TYPE: ACUTE PAIN
TYPE: ACUTE PAIN;SURGICAL PAIN
TYPE: ACUTE PAIN;SURGICAL PAIN

## 2022-01-19 NOTE — DISCHARGE INSTRUCTIONS
PATIENT DISCHARGE EDUCATION INSTRUCTION SHEET  REASONS TO CALL YOUR OBSTETRICIAN  · Persistent fever, shaking, chills (Temperature higher than 100.4) may indicate you have an infection  · Heavy bleeding: soaking more than 1 pad per hour; Passing clots an egg-sized clot or bigger may mean you have an postpartum hemorrhage  · Foul odor from vagina or bad smelling or discolored discharge or blood  · Breast infection (Mastitis symptoms); breast pain, chills, fever, redness or red streaks, may feel flu like symptoms  · Urinary pain, burning or frequency  · Incision that is not healing, increased redness, swelling, tenderness or pain, or any pus from episiotomy or  site may mean you have an infection  · Redness, swelling, warmth, or painful to touch in the calf area of your leg may mean you have a blood clot  · Severe or intensified depression, thoughts or feelings of wanting to hurt yourself or someone else   · Pain in chest, obstructed breathing or shortness of breath (trouble catching your breath) may mean you are having a postpartum complication. Call your provider immediately   · Headache that does not get better, even after taking medicine, a bad headache with vision changes or pain in the upper right area of your belly may mean you have high blood pressure or post birth preeclampsia. Call your provider immediately    HAND WASHING  All family and friends should wash their hands:  · Before and after holding the baby  · Before feeding the baby  · After using the restroom or changing the baby's diaper    WOUND CARE  Ask your physician for additional care instructions. In general:  ·  Incision:  · May shower and pat incision dry   · Keep the incision clean and dry  · There should not be any opening or pus from the incision  · Continue to walk at home 3 times a day   · Do NOT lift anything heavier than your baby (over 10 pounds)  · Encourage family to help participate in care of the  to allow  "rest and mom time to heal  VAGINAL CARE AND BLEEDING  · Nothing inside vagina for 6 weeks:   · No sexual intercourse, tampons or douching  · Bleeding may continue for 2-4 weeks. Amount and color may vary  · Soaking 1 pad or more in an hour for several hours is considered heavy bleeding  · Passing large egg sized blood clots can be concerning  · If you feel like you have heavy bleeding or are having increasing amount of blood clots call your Obstetrician immediately  · If you begin feeling faint upon standing, feeling sick to your stomach, have clammy skin, a really fast heartbeat, have chills, start feeling confused, dizzy, sleepy or weak, or feeling like you're going to faint call your Obstetrician immediately    HYPERTENSION   Preeclampsia or gestational hypertension are types of high blood pressure that only pregnant women can get. It is important for you to be aware of symptoms to seek early intervention and treatment. If you have any of these symptoms immediately call your Obstetrician    · Vision changes or blurred vision   · Severe headache or pain that is unrelieved with medication and will not go away  · Persistent pain in upper abdomen or shoulder   · Increased swelling of face, feet, or hands  · Difficulty breathing or shortness of breath at rest  · Urinating less than usual    URINATION AND BOWEL MOVEMENTS  · Eating more fiber (bran cereal, fruits, and vegetables) and drinking plenty of fluids will help to avoid constipation  · Urinary frequency and urgency after childbirth is normal  · If you experience any urinary pain, burning or frequency call your provider    BIRTH CONTROL  · It is possible to become pregnant at any time after delivery and while breastfeeding  · Plan to discuss a method of birth control with your physician at your post delivery follow up visit    POSTPARTUM BLUES  During the first few days after birth, you may experience a sense of the \"blues\" which may include impatience, " "irritability or even crying. These feelings come and go quickly. However, as many as 1 in 10 women experience emotional symptoms known as postpartum depression.     POSTPARTUM DEPRESSION  May start as early as the second or third day after delivery or take several weeks or months to develop. Symptoms of \"blues\" are present, but are more intense: Crying spells; loss of appetite; feelings of hopelessness or loss of control; fear of touching the baby; over concern or no concern at all about the baby; little or no concern about your own appearance/caring for yourself; and/or inability to sleep or excessive sleeping. Contact your Obstetrician if you are experiencing any of these symptoms     PREVENTING SHAKEN BABY  If you are angry or stressed, PUT THE BABY IN THE CRIB, step away, take some deep breaths, and wait until you are calm to care for the baby. DO NOT SHAKE THE BABY. You are not alone, call a supporter for help.  · [facility] 24/7 crisis call line (976-444-7993) or (1-986.643.9684)  · You can also text them, text \"ANSWER\" (138490)      "

## 2022-01-19 NOTE — CARE PLAN
The patient is Stable - Low risk of patient condition declining or worsening    Shift Goals  Clinical Goals: VSS; pain management     Progress made toward(s) clinical / shift goals:  VSS    Problem: Altered Physiologic Condition  Goal: Patient physiologically stable as evidenced by normal lochia, palpable uterine involution and vitals within normal limits  Outcome: Progressing  NOTE: Patient is physiologically stable as evidenced by scant lochia rubra, firm fundus one fingerbreadth below umbilicus, and vital signs WDL. Will continue to monitor patient condition.      Problem: Infection - Postpartum  Goal: Postpartum patient will be free of signs and symptoms of infection  Outcome: Progressing  NOTE: Patient is afebrile and absent for other signs/symptoms of infection. Vital signs WDL.  Will continue to monitor patient condition.         Patient is not progressing towards the following goals: NA

## 2022-01-19 NOTE — PROGRESS NOTES
0700: Bedside report completed with MAE Ordaz RN and assumed care of pt. Pt resting - do not disturb.      0730: 12 hour chart check completed. Orders/MAR reviewed.   0800: Patient assessment completed. Discussed pain management plan and patient requests medications be provided as available/scheduled. Patient denies dizziness and headaches; states she is voiding w/o difficulty and passing flatus. Reviewed plan of care, all questions answered, and rounding in place.

## 2022-01-19 NOTE — CARE PLAN
The patient is Stable - Low risk of patient condition declining or worsening    Shift Goals  Clinical Goals: VSS; pt will meet all clinical goals for discharge    Progress made toward(s) clinical / shift goals:  VSS; all clinical goals met for discharge.     Problem: Knowledge Deficit - Postpartum  Goal: Patient will verbalize and demonstrate understanding of self and infant care  Outcome: Met     Problem: Psychosocial - Postpartum  Goal: Patient will verbalize and demonstrate effective bonding and parenting behavior  Outcome: Met     Problem: Altered Physiologic Condition  Goal: Patient physiologically stable as evidenced by normal lochia, palpable uterine involution and vitals within normal limits  Outcome: Met     Problem: Infection - Postpartum  Goal: Postpartum patient will be free of signs and symptoms of infection  Outcome: Met     Problem: Respiratory/Oxygenation Function Post-Surgical  Goal: Patient will achieve/maintain normal respiratory rate/effort  Outcome: Met     Problem: Bowel Elimination - Post Surgical  Goal: Patient will resume regular bowel sounds and function with no discomfort or distention  Outcome: Met     Problem: Early Mobilization - Post Surgery  Goal: Early mobilization post surgery  Outcome: Met       Patient is not progressing towards the following goals: NA

## 2022-01-19 NOTE — DISCHARGE PLANNING
Meds-to-Beds: Discharge prescription orders listed below delivered to patient's bedside. RN notified. Patient counseled.      Current Outpatient Medications   Medication Sig Dispense Refill   • oxyCODONE-acetaminophen (PERCOCET) 5-325 MG Tab Take 1 Tablet by mouth every 6 hours as needed for Moderate Pain or Severe Pain for up to 7 days. 28 Tablet 0   • ibuprofen (MOTRIN) 800 MG Tab Take 1 Tablet by mouth every 8 hours as needed for Mild Pain or Moderate Pain. 30 Tablet 0   • ferrous sulfate 325 (65 Fe) MG tablet Take 1 Tablet by mouth every day. 30 Tablet 0   • docusate sodium (COLACE) 100 MG Cap Take 1 Capsule by mouth 2 times a day. 60 Capsule 0      Celia Eagle, PharmD

## 2022-01-19 NOTE — CARE PLAN
The patient is Stable - Low risk of patient condition declining or worsening    Shift Goals  Clinical Goals: vitals wnl, pain control, rest    Progress made toward(s) clinical / shift goals:  vitals remain stable. Pain tolerable at this time, no other needs. Patient able to get some sleep earlier tonight.     Patient is not progressing towards the following goals:      Problem: Infection - Postpartum  Goal: Postpartum patient will be free of signs and symptoms of infection  Outcome: Progressing  Note: No s/s of infection. Incision dressing appears intact and clean. Afebrile.      Problem: Bowel Elimination - Post Surgical  Goal: Patient will resume regular bowel sounds and function with no discomfort or distention  Outcome: Progressing  Note: Patient has bowel sounds present. She is passing gas, however has not been successful at having a bowel movement. She does have PRN medications available.

## 2022-01-19 NOTE — PROGRESS NOTES
Pt reports abdominal discomfort and states she feels she needs to have a BM. Pt requests PRN laxative. Order placed per Dr. Cook.

## 2022-01-19 NOTE — PROGRESS NOTES
1900 Assumed care of patient. Received report from Georgina PATRICIO. infant at this time is in the  nursery per MOBs request to get some rest. Infant was fed previuosly and MOB plans on trying to get some sleep until about 2100 as she has not had much sleep since delivery. RN check on patient afterwards and she was sleeping at the time, will come back to reassess.       Assessment complete. Fundus is firm with light lochia rubra. No pain or discomfort at this time as patient had just woken up and has not stood out of bed. PRN and scheduled medications discussed. Bed is locked and in low position. Call light left within reach and encouraged to call for any needs if necessary.

## 2022-01-20 NOTE — PROGRESS NOTES
Discharge education reviewed with patient and partner.  Verified Meds-to-Beds delivery of prescriptions and medication list reviewed. Pt verbalized understanding of discharge instructions including follow-up appointments. Patient given copies of discharge education, discharge summary, Controlled Substances Use informed consent, and pain management handout. Pt verbalizes understanding and all questions answered.

## 2022-11-27 ENCOUNTER — APPOINTMENT (OUTPATIENT)
Dept: RADIOLOGY | Facility: MEDICAL CENTER | Age: 37
End: 2022-11-27
Attending: EMERGENCY MEDICINE
Payer: MEDICAID

## 2022-11-27 ENCOUNTER — HOSPITAL ENCOUNTER (EMERGENCY)
Facility: MEDICAL CENTER | Age: 37
End: 2022-11-27
Attending: EMERGENCY MEDICINE
Payer: MEDICAID

## 2022-11-27 VITALS
WEIGHT: 169 LBS | BODY MASS INDEX: 28.16 KG/M2 | RESPIRATION RATE: 18 BRPM | OXYGEN SATURATION: 99 % | HEIGHT: 65 IN | SYSTOLIC BLOOD PRESSURE: 116 MMHG | DIASTOLIC BLOOD PRESSURE: 78 MMHG | TEMPERATURE: 98.7 F | HEART RATE: 88 BPM

## 2022-11-27 DIAGNOSIS — M54.9 PAIN, UPPER BACK: ICD-10-CM

## 2022-11-27 DIAGNOSIS — S01.01XA LACERATION OF SCALP, INITIAL ENCOUNTER: ICD-10-CM

## 2022-11-27 DIAGNOSIS — S09.90XA CLOSED HEAD INJURY, INITIAL ENCOUNTER: ICD-10-CM

## 2022-11-27 DIAGNOSIS — M54.2 NECK PAIN: ICD-10-CM

## 2022-11-27 PROCEDURE — 700102 HCHG RX REV CODE 250 W/ 637 OVERRIDE(OP): Performed by: EMERGENCY MEDICINE

## 2022-11-27 PROCEDURE — 304217 HCHG IRRIGATION SYSTEM

## 2022-11-27 PROCEDURE — 305308 HCHG STAPLER,SKIN,DISP.

## 2022-11-27 PROCEDURE — 700101 HCHG RX REV CODE 250: Performed by: EMERGENCY MEDICINE

## 2022-11-27 PROCEDURE — 70450 CT HEAD/BRAIN W/O DYE: CPT

## 2022-11-27 PROCEDURE — A9270 NON-COVERED ITEM OR SERVICE: HCPCS | Performed by: EMERGENCY MEDICINE

## 2022-11-27 PROCEDURE — 72070 X-RAY EXAM THORAC SPINE 2VWS: CPT

## 2022-11-27 PROCEDURE — 700111 HCHG RX REV CODE 636 W/ 250 OVERRIDE (IP): Performed by: EMERGENCY MEDICINE

## 2022-11-27 PROCEDURE — 90471 IMMUNIZATION ADMIN: CPT

## 2022-11-27 PROCEDURE — 72125 CT NECK SPINE W/O DYE: CPT

## 2022-11-27 PROCEDURE — 304999 HCHG REPAIR-SIMPLE/INTERMED LEVEL 1

## 2022-11-27 PROCEDURE — 90715 TDAP VACCINE 7 YRS/> IM: CPT | Performed by: EMERGENCY MEDICINE

## 2022-11-27 PROCEDURE — 99283 EMERGENCY DEPT VISIT LOW MDM: CPT

## 2022-11-27 RX ORDER — ACETAMINOPHEN 325 MG/1
650 TABLET ORAL ONCE
Status: COMPLETED | OUTPATIENT
Start: 2022-11-27 | End: 2022-11-27

## 2022-11-27 RX ADMIN — LIDOCAINE HYDROCHLORIDE 10 ML: 10; .005 INJECTION, SOLUTION EPIDURAL; INFILTRATION; INTRACAUDAL; PERINEURAL at 14:15

## 2022-11-27 RX ADMIN — CLOSTRIDIUM TETANI TOXOID ANTIGEN (FORMALDEHYDE INACTIVATED), CORYNEBACTERIUM DIPHTHERIAE TOXOID ANTIGEN (FORMALDEHYDE INACTIVATED), BORDETELLA PERTUSSIS TOXOID ANTIGEN (GLUTARALDEHYDE INACTIVATED), BORDETELLA PERTUSSIS FILAMENTOUS HEMAGGLUTININ ANTIGEN (FORMALDEHYDE INACTIVATED), BORDETELLA PERTUSSIS PERTACTIN ANTIGEN, AND BORDETELLA PERTUSSIS FIMBRIAE 2/3 ANTIGEN 0.5 ML: 5; 2; 2.5; 5; 3; 5 INJECTION, SUSPENSION INTRAMUSCULAR at 15:09

## 2022-11-27 RX ADMIN — ACETAMINOPHEN 650 MG: 325 TABLET, FILM COATED ORAL at 15:55

## 2022-11-27 ASSESSMENT — FIBROSIS 4 INDEX: FIB4 SCORE: 0.84
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
## 2022-11-27 NOTE — ED NOTES
Pt brought back to room, PT AOx4, dressing taken off of head, bleeding has stopped. Pt denies LOC complains of slight headache, denies taking anything PTA

## 2024-01-24 ENCOUNTER — HOSPITAL ENCOUNTER (EMERGENCY)
Facility: MEDICAL CENTER | Age: 39
End: 2024-01-25
Attending: STUDENT IN AN ORGANIZED HEALTH CARE EDUCATION/TRAINING PROGRAM
Payer: COMMERCIAL

## 2024-01-24 DIAGNOSIS — Z33.1 IUP (INTRAUTERINE PREGNANCY), INCIDENTAL: ICD-10-CM

## 2024-01-24 DIAGNOSIS — L30.9 ECZEMA, UNSPECIFIED TYPE: ICD-10-CM

## 2024-01-24 DIAGNOSIS — M67.40 GANGLION CYST: ICD-10-CM

## 2024-01-24 PROCEDURE — 87389 HIV-1 AG W/HIV-1&-2 AB AG IA: CPT

## 2024-01-24 PROCEDURE — 36415 COLL VENOUS BLD VENIPUNCTURE: CPT

## 2024-01-24 PROCEDURE — 86780 TREPONEMA PALLIDUM: CPT

## 2024-01-24 PROCEDURE — 99284 EMERGENCY DEPT VISIT MOD MDM: CPT

## 2024-01-25 ENCOUNTER — APPOINTMENT (OUTPATIENT)
Dept: RADIOLOGY | Facility: MEDICAL CENTER | Age: 39
End: 2024-01-25
Attending: STUDENT IN AN ORGANIZED HEALTH CARE EDUCATION/TRAINING PROGRAM
Payer: COMMERCIAL

## 2024-01-25 VITALS
DIASTOLIC BLOOD PRESSURE: 78 MMHG | HEART RATE: 97 BPM | WEIGHT: 193.34 LBS | SYSTOLIC BLOOD PRESSURE: 116 MMHG | RESPIRATION RATE: 16 BRPM | HEIGHT: 65 IN | BODY MASS INDEX: 32.21 KG/M2 | TEMPERATURE: 98.2 F | OXYGEN SATURATION: 99 %

## 2024-01-25 LAB
ALBUMIN SERPL BCP-MCNC: 3.2 G/DL (ref 3.2–4.9)
ALBUMIN/GLOB SERPL: 0.8 G/DL
ALP SERPL-CCNC: 75 U/L (ref 30–99)
ALT SERPL-CCNC: 7 U/L (ref 2–50)
ANION GAP SERPL CALC-SCNC: 12 MMOL/L (ref 7–16)
ANISOCYTOSIS BLD QL SMEAR: ABNORMAL
AST SERPL-CCNC: 14 U/L (ref 12–45)
B-HCG SERPL-ACNC: ABNORMAL MIU/ML (ref 0–5)
BASOPHILS # BLD AUTO: 0.9 % (ref 0–1.8)
BASOPHILS # BLD: 0.1 K/UL (ref 0–0.12)
BILIRUB SERPL-MCNC: 0.2 MG/DL (ref 0.1–1.5)
BUN SERPL-MCNC: 7 MG/DL (ref 8–22)
CALCIUM ALBUM COR SERPL-MCNC: 9.5 MG/DL (ref 8.5–10.5)
CALCIUM SERPL-MCNC: 8.9 MG/DL (ref 8.5–10.5)
CHLORIDE SERPL-SCNC: 107 MMOL/L (ref 96–112)
CO2 SERPL-SCNC: 20 MMOL/L (ref 20–33)
CREAT SERPL-MCNC: 0.37 MG/DL (ref 0.5–1.4)
EKG IMPRESSION: NORMAL
EOSINOPHIL # BLD AUTO: 0.36 K/UL (ref 0–0.51)
EOSINOPHIL NFR BLD: 3.4 % (ref 0–6.9)
ERYTHROCYTE [DISTWIDTH] IN BLOOD BY AUTOMATED COUNT: 46.9 FL (ref 35.9–50)
GFR SERPLBLD CREATININE-BSD FMLA CKD-EPI: 134 ML/MIN/1.73 M 2
GLOBULIN SER CALC-MCNC: 3.8 G/DL (ref 1.9–3.5)
GLUCOSE SERPL-MCNC: 116 MG/DL (ref 65–99)
HCT VFR BLD AUTO: 33.9 % (ref 37–47)
HGB BLD-MCNC: 11.3 G/DL (ref 12–16)
HIV 1+2 AB+HIV1 P24 AG SERPL QL IA: NORMAL
LYMPHOCYTES # BLD AUTO: 2.68 K/UL (ref 1–4.8)
LYMPHOCYTES NFR BLD: 25 % (ref 22–41)
MANUAL DIFF BLD: NORMAL
MCH RBC QN AUTO: 28.9 PG (ref 27–33)
MCHC RBC AUTO-ENTMCNC: 33.3 G/DL (ref 32.2–35.5)
MCV RBC AUTO: 86.7 FL (ref 81.4–97.8)
MICROCYTES BLD QL SMEAR: ABNORMAL
MONOCYTES # BLD AUTO: 0.18 K/UL (ref 0–0.85)
MONOCYTES NFR BLD AUTO: 1.7 % (ref 0–13.4)
MORPHOLOGY BLD-IMP: NORMAL
MYELOCYTES NFR BLD MANUAL: 1.7 %
NEUTROPHILS # BLD AUTO: 7.2 K/UL (ref 1.82–7.42)
NEUTROPHILS NFR BLD: 66.4 % (ref 44–72)
NEUTS BAND NFR BLD MANUAL: 0.9 % (ref 0–10)
NRBC # BLD AUTO: 0 K/UL
NRBC BLD-RTO: 0 /100 WBC (ref 0–0.2)
NUMBER OF RH DOSES IND 8505RD: NORMAL
PLATELET # BLD AUTO: 264 K/UL (ref 164–446)
PLATELET BLD QL SMEAR: NORMAL
PMV BLD AUTO: 9 FL (ref 9–12.9)
POTASSIUM SERPL-SCNC: 3.3 MMOL/L (ref 3.6–5.5)
PROT SERPL-MCNC: 7 G/DL (ref 6–8.2)
RBC # BLD AUTO: 3.91 M/UL (ref 4.2–5.4)
RBC BLD AUTO: PRESENT
RH BLD: NORMAL
SODIUM SERPL-SCNC: 139 MMOL/L (ref 135–145)
T PALLIDUM AB SER QL IA: NORMAL
WBC # BLD AUTO: 10.7 K/UL (ref 4.8–10.8)
WBC TOXIC VACUOLES BLD QL SMEAR: NORMAL

## 2024-01-25 PROCEDURE — 84702 CHORIONIC GONADOTROPIN TEST: CPT

## 2024-01-25 PROCEDURE — 85027 COMPLETE CBC AUTOMATED: CPT

## 2024-01-25 PROCEDURE — 93005 ELECTROCARDIOGRAM TRACING: CPT | Performed by: STUDENT IN AN ORGANIZED HEALTH CARE EDUCATION/TRAINING PROGRAM

## 2024-01-25 PROCEDURE — 36415 COLL VENOUS BLD VENIPUNCTURE: CPT

## 2024-01-25 PROCEDURE — 700105 HCHG RX REV CODE 258: Performed by: STUDENT IN AN ORGANIZED HEALTH CARE EDUCATION/TRAINING PROGRAM

## 2024-01-25 PROCEDURE — 86901 BLOOD TYPING SEROLOGIC RH(D): CPT

## 2024-01-25 PROCEDURE — 85007 BL SMEAR W/DIFF WBC COUNT: CPT

## 2024-01-25 PROCEDURE — 76815 OB US LIMITED FETUS(S): CPT

## 2024-01-25 PROCEDURE — 80053 COMPREHEN METABOLIC PANEL: CPT

## 2024-01-25 RX ORDER — BETAMETHASONE DIPROPIONATE 0.05 %
1 OINTMENT (GRAM) TOPICAL 2 TIMES DAILY
Qty: 45 G | Refills: 0 | Status: SHIPPED | OUTPATIENT
Start: 2024-01-25

## 2024-01-25 RX ORDER — SODIUM CHLORIDE 9 MG/ML
INJECTION, SOLUTION INTRAVENOUS ONCE
Status: COMPLETED | OUTPATIENT
Start: 2024-01-25 | End: 2024-01-25

## 2024-01-25 RX ADMIN — SODIUM CHLORIDE: 9 INJECTION, SOLUTION INTRAVENOUS at 00:45

## 2024-01-25 NOTE — ED PROVIDER NOTES
ED Provider Note    CHIEF COMPLAINT  Chief Complaint   Patient presents with    Rash     Pt reports an itchy rash which has spread from patients legs up to arms, stomach and back. Pt reports the rash started 2023 and has continued to spread. The rash is white and flat with no redness or edema. Pt also c/o of left wrist pain. Palpable mass on left wrist. Took at home pregnancy test a couple of weeks ago which was positive, pt unaware of how far along she is.     Wrist Pain       EXTERNAL RECORDS REVIEWED  Outpatient Notes labor and delivery note on 2022    HPI/ROS  LIMITATION TO HISTORY   Select: : None  OUTSIDE HISTORIAN(S):    Angelina Rader is a 38 y.o. female who presents with wrist pain, rash and feelings of aching in her belly.  Patient reports LMP was 3 weeks ago.  Patient denies vaginal bleeding or discharge.  Patient is complaining of pain to her left wrist, unknown why.  Patient also complaining of a rash on her bilateral upper extremities which she has been itching.  Patient reports strong family history of eczema.  Patient denies dysuria or hematuria.    PAST MEDICAL HISTORY   has a past medical history of Heart burn, Hypertension, Placenta previa, Preeclampsia, Pregnant, Psychiatric problem, and Urinary tract infection, site not specified.    SURGICAL HISTORY   has a past surgical history that includes dilation and curettage (); abdominal exploration (); cholecystectomy; primary c section (2015); repeat c section (N/A, 2019); and repeat c section (Bilateral, 2022).    FAMILY HISTORY  History reviewed. No pertinent family history.    SOCIAL HISTORY  Social History     Tobacco Use    Smoking status: Former     Current packs/day: 0.00     Types: Cigarettes     Quit date: 2005     Years since quittin.1    Smokeless tobacco: Never    Tobacco comments:     Pt. states quit when she found out she was pregnant.    Vaping Use    Vaping Use: Never used   Substance  "and Sexual Activity    Alcohol use: No    Drug use: No    Sexual activity: Yes     Partners: Male     Comment: None       CURRENT MEDICATIONS  Home Medications       Reviewed by Krystal Tapia R.N. (Registered Nurse) on 01/24/24 at 2309  Med List Status: <None>     Medication Last Dose Status   docusate sodium (COLACE) 100 MG Cap  Active   ferrous sulfate 325 (65 Fe) MG tablet  Active   ibuprofen (MOTRIN) 800 MG Tab  Active   Prenatal MV-Min-Fe Fum-FA-DHA (PRENATAL 1 PO)  Active                    ALLERGIES  No Known Allergies    PHYSICAL EXAM  VITAL SIGNS: /78   Pulse 98   Temp 36.9 °C (98.4 °F)   Resp 17   Ht 1.651 m (5' 5\")   Wt 87.7 kg (193 lb 5.5 oz)   LMP  (LMP Unknown)   SpO2 98%   BMI 32.17 kg/m²    Vitals and nursing note reviewed.   Constitutional:       Comments: Patient is lying in bed supine, pleasant, conversant, speaking in complete sentences   HENT:      Head: Normocephalic and atraumatic.   Eyes:      Extraocular Movements: Extraocular movements intact.      Conjunctiva/sclera: Conjunctivae normal.      Pupils: Pupils are equal, round, and reactive to light.   Cardiovascular:      Pulses: Normal pulses.      Comments:   Pulmonary:      Effort: Pulmonary effort is normal. No respiratory distress.   Abdominal:      Comments: Abdomen is soft, non-tender, non-distended, non-rigid, no rebound, guarding, masses, no McBurney's point tenderness, no peritoneal signs, negative Rovsing sign, negative Jalloh sign.  No CVA tenderness to palpation. Benign abdomen.  Point-of-care ultrasound demonstrates intrauterine pregnancy.  Musculoskeletal:      Patient has a focal area of swelling, tender, nonerythematous to the distal radius to motion at the left wrist intact, Distal affected extremity demonstrates intact sensation, motor, cap refill less than 2 seconds and 2+ pulses, warm and well perfused, no signs of tendon rupture, no crepitus on palpation.       General: No swelling. Normal range of " motion.      Cervical back: Normal range of motion. No rigidity.   Skin:  Patient has a pruritic, dry, erythematous rash on the upper extremities     General: Skin is warm and dry.      Capillary Refill: Capillary refill takes less than 2 seconds.   Neurological:      Mental Status: Alert.       DIAGNOSTIC STUDIES / PROCEDURES  EKG  I have independently interpreted this EKG  No acute ischemia    LABS  Mild hypokalemia    RADIOLOGY  I have independently interpreted the diagnostic imaging associated with this visit and am waiting the final reading from the radiologist.   My preliminary interpretation is as follows: IUP  Radiologist interpretation: IUP with low-lying placenta    COURSE & MEDICAL DECISION MAKING      INITIAL ASSESSMENT, COURSE AND PLAN  Care Narrative: Patient is pregnant, beta-hCG pending, ultrasound to evaluate for status of pregnancy.  CBC to evaluate for acute anemia and leukocytosis.  CMP to evaluate for acute electrolyte abnormality, acute kidney injury, acute liver failure or dysfunction.  STD testing ordered by nursing care in triage for unknown reason.  Rh testing pending.  IV fluids for suspected dehydration.  Patient presenting with signs symptoms of eczema, no evidence of necrotizing soft tissue infection, anaphylaxis, Green-Nikita syndrome or toxic epidermal necrolysis.  Patient has a ganglion cyst on her left wrist, will be given follow-up with orthopedic surgery.    Electronically signed by: Julio Ribeiro M.D., 1/25/2024 12:43 AM    Lab work demonstrates mild anemia and mild hypokalemia.  Ultrasound demonstrates IUP with low-lying placenta, patient reports she has OB/GYN to follow-up with her newly diagnosed pregnancy.  HIV syphilis negative.  Rh+, RhoGAM not indicated at this time.  Patient given follow-up with orthopedic surgery for ganglion cyst.  Patient given betamethasone for eczema.    Repeat physical exam benign.  I doubt any serious emergency process at this time.   Patient and/or family, friends given strict return precautions for worsening symptoms and care instructions. They have demonstrated understanding of discharge instructions through teach back mechanism. Advised PCP follow-up in 1-2 days.  Patient/family/friend expresses understanding and agrees to plan.    This dictation has been created using voice recognition software. I am continuously working with the software to minimize the number of voice recognition errors and I have made every attempt to manually correct the errors within my dictation. However errors  related to this voice recognition software may still exist and should be interpreted within the appropriate context.     Electronically signed by: Julio Ribeiro M.D., 1/25/2024 3:39 AM        FINAL DIAGNOSIS  1. IUP (intrauterine pregnancy), incidental    2. Ganglion cyst    3. Eczema, unspecified type           Electronically signed by: Julio Ribeiro M.D., 1/25/2024 12:39 AM

## 2024-01-25 NOTE — ED TRIAGE NOTES
"Chief Complaint   Patient presents with    Rash     Pt reports an itchy rash which has spread from patients legs up to arms, stomach and back. Pt reports the rash started October 2023 and has continued to spread. The rash is white and flat with no redness or edema. Pt also c/o of left wrist pain. Palpable mass on left wrist. Took at home pregnancy test a couple of weeks ago which was positive, pt unaware of how far along she is.     Wrist Pain     Pt is alert, oriented, and follows commands. Pt speaking in full sentences and responds appropriately to questions. No acute distress noted in triage and respirations are even and unlabored.      Pt placed in lobby and educated on triage process. Pt encouraged to alert staff for any changes in condition.     Blood Pressure: 130/65, Pulse: (!) 101, Respiration: 16, Temperature: 35.8 °C (96.5 °F), Height: 165.1 cm (5' 5\"), Weight: 87.7 kg (193 lb 5.5 oz), BMI (Calculated): 32.17, BSA (Calculated): 2, Pulse Oximetry: 96 %, O2 (LPM): 0    "

## 2024-01-25 NOTE — ED NOTES
Pt discharged to home. Discharge paperwork provided. Education provided by ERP. Reinforced discharge instructions.  Pt was given follow up instructions and prescriptions.  Pt verbalized understanding of all instructions for discharge.   Patient went out of the ER ambulatory with steady gait with kids., alert and oriented x 4, with all belongings.

## 2024-02-17 ENCOUNTER — APPOINTMENT (OUTPATIENT)
Dept: RADIOLOGY | Facility: MEDICAL CENTER | Age: 39
End: 2024-02-17
Attending: ADVANCED PRACTICE MIDWIFE
Payer: COMMERCIAL

## 2024-02-17 ENCOUNTER — HOSPITAL ENCOUNTER (EMERGENCY)
Facility: MEDICAL CENTER | Age: 39
End: 2024-02-18
Attending: SPECIALIST | Admitting: OBSTETRICS & GYNECOLOGY
Payer: COMMERCIAL

## 2024-02-17 VITALS
HEIGHT: 65 IN | HEART RATE: 89 BPM | OXYGEN SATURATION: 96 % | RESPIRATION RATE: 18 BRPM | WEIGHT: 200 LBS | BODY MASS INDEX: 33.32 KG/M2 | TEMPERATURE: 96.8 F | DIASTOLIC BLOOD PRESSURE: 59 MMHG | SYSTOLIC BLOOD PRESSURE: 106 MMHG

## 2024-02-17 DIAGNOSIS — O23.42 URINARY TRACT INFECTION IN MOTHER DURING SECOND TRIMESTER OF PREGNANCY: ICD-10-CM

## 2024-02-17 LAB
ABO GROUP BLD: NORMAL
APPEARANCE UR: ABNORMAL
BASOPHILS # BLD AUTO: 0.2 % (ref 0–1.8)
BASOPHILS # BLD: 0.03 K/UL (ref 0–0.12)
BLD GP AB SCN SERPL QL: NORMAL
COLOR UR AUTO: YELLOW
EOSINOPHIL # BLD AUTO: 0.41 K/UL (ref 0–0.51)
EOSINOPHIL NFR BLD: 3.1 % (ref 0–6.9)
ERYTHROCYTE [DISTWIDTH] IN BLOOD BY AUTOMATED COUNT: 47.2 FL (ref 35.9–50)
EST. AVERAGE GLUCOSE BLD GHB EST-MCNC: 114 MG/DL
GLUCOSE UR QL STRIP.AUTO: NEGATIVE MG/DL
HBA1C MFR BLD: 5.6 % (ref 4–5.6)
HBV SURFACE AG SER QL: ABNORMAL
HCT VFR BLD AUTO: 32.9 % (ref 37–47)
HCV AB SER QL: ABNORMAL
HGB BLD-MCNC: 10.6 G/DL (ref 12–16)
HIV 1+2 AB+HIV1 P24 AG SERPL QL IA: NORMAL
IMM GRANULOCYTES # BLD AUTO: 0.14 K/UL (ref 0–0.11)
IMM GRANULOCYTES NFR BLD AUTO: 1.1 % (ref 0–0.9)
KETONES UR QL STRIP.AUTO: 15 MG/DL
LEUKOCYTE ESTERASE UR QL STRIP.AUTO: ABNORMAL
LYMPHOCYTES # BLD AUTO: 2.61 K/UL (ref 1–4.8)
LYMPHOCYTES NFR BLD: 20 % (ref 22–41)
MCH RBC QN AUTO: 28 PG (ref 27–33)
MCHC RBC AUTO-ENTMCNC: 32.2 G/DL (ref 32.2–35.5)
MCV RBC AUTO: 87 FL (ref 81.4–97.8)
MONOCYTES # BLD AUTO: 0.62 K/UL (ref 0–0.85)
MONOCYTES NFR BLD AUTO: 4.7 % (ref 0–13.4)
NEUTROPHILS # BLD AUTO: 9.26 K/UL (ref 1.82–7.42)
NEUTROPHILS NFR BLD: 70.9 % (ref 44–72)
NITRITE UR QL STRIP.AUTO: POSITIVE
NRBC # BLD AUTO: 0 K/UL
NRBC BLD-RTO: 0 /100 WBC (ref 0–0.2)
PH UR STRIP.AUTO: 6 [PH] (ref 5–8)
PLATELET # BLD AUTO: 253 K/UL (ref 164–446)
PMV BLD AUTO: 9.1 FL (ref 9–12.9)
PROT UR QL STRIP: 30 MG/DL
RBC # BLD AUTO: 3.78 M/UL (ref 4.2–5.4)
RBC UR QL AUTO: ABNORMAL
RH BLD: NORMAL
RUBV AB SER QL: 38 IU/ML
SP GR UR STRIP.AUTO: >=1.03 (ref 1–1.03)
T PALLIDUM AB SER QL IA: ABNORMAL
WBC # BLD AUTO: 13.1 K/UL (ref 4.8–10.8)

## 2024-02-17 PROCEDURE — 80307 DRUG TEST PRSMV CHEM ANLYZR: CPT

## 2024-02-17 PROCEDURE — 76815 OB US LIMITED FETUS(S): CPT

## 2024-02-17 PROCEDURE — 302449 STATCHG TRIAGE ONLY (STATISTIC)

## 2024-02-17 PROCEDURE — 86900 BLOOD TYPING SEROLOGIC ABO: CPT

## 2024-02-17 PROCEDURE — 87591 N.GONORRHOEAE DNA AMP PROB: CPT

## 2024-02-17 PROCEDURE — 86780 TREPONEMA PALLIDUM: CPT

## 2024-02-17 PROCEDURE — 81002 URINALYSIS NONAUTO W/O SCOPE: CPT

## 2024-02-17 PROCEDURE — 86850 RBC ANTIBODY SCREEN: CPT

## 2024-02-17 PROCEDURE — 87491 CHLMYD TRACH DNA AMP PROBE: CPT

## 2024-02-17 PROCEDURE — 83036 HEMOGLOBIN GLYCOSYLATED A1C: CPT

## 2024-02-17 PROCEDURE — 86901 BLOOD TYPING SEROLOGIC RH(D): CPT

## 2024-02-17 PROCEDURE — 36415 COLL VENOUS BLD VENIPUNCTURE: CPT

## 2024-02-17 PROCEDURE — 87389 HIV-1 AG W/HIV-1&-2 AB AG IA: CPT

## 2024-02-17 PROCEDURE — 85025 COMPLETE CBC W/AUTO DIFF WBC: CPT

## 2024-02-17 PROCEDURE — 86592 SYPHILIS TEST NON-TREP QUAL: CPT

## 2024-02-17 PROCEDURE — 86762 RUBELLA ANTIBODY: CPT

## 2024-02-17 PROCEDURE — 87340 HEPATITIS B SURFACE AG IA: CPT

## 2024-02-17 PROCEDURE — 86803 HEPATITIS C AB TEST: CPT

## 2024-02-17 ASSESSMENT — FIBROSIS 4 INDEX: FIB4 SCORE: 0.75

## 2024-02-18 LAB
AMPHET UR QL SCN: NEGATIVE
BARBITURATES UR QL SCN: NEGATIVE
BENZODIAZ UR QL SCN: NEGATIVE
BZE UR QL SCN: NEGATIVE
C TRACH DNA SPEC QL NAA+PROBE: NEGATIVE
CANNABINOIDS UR QL SCN: NEGATIVE
FENTANYL UR QL: NEGATIVE
METHADONE UR QL SCN: NEGATIVE
N GONORRHOEA DNA SPEC QL NAA+PROBE: NEGATIVE
OPIATES UR QL SCN: NEGATIVE
OXYCODONE UR QL SCN: NEGATIVE
PCP UR QL SCN: NEGATIVE
PROPOXYPH UR QL SCN: NEGATIVE
SPECIMEN SOURCE: NORMAL

## 2024-02-18 PROCEDURE — 700101 HCHG RX REV CODE 250: Performed by: ADVANCED PRACTICE MIDWIFE

## 2024-02-18 PROCEDURE — 59025 FETAL NON-STRESS TEST: CPT

## 2024-02-18 PROCEDURE — 87086 URINE CULTURE/COLONY COUNT: CPT

## 2024-02-18 PROCEDURE — 96372 THER/PROPH/DIAG INJ SC/IM: CPT

## 2024-02-18 PROCEDURE — 700111 HCHG RX REV CODE 636 W/ 250 OVERRIDE (IP): Mod: JZ | Performed by: ADVANCED PRACTICE MIDWIFE

## 2024-02-18 PROCEDURE — 700105 HCHG RX REV CODE 258: Performed by: ADVANCED PRACTICE MIDWIFE

## 2024-02-18 PROCEDURE — 87186 SC STD MICRODIL/AGAR DIL: CPT

## 2024-02-18 PROCEDURE — 87077 CULTURE AEROBIC IDENTIFY: CPT

## 2024-02-18 RX ORDER — CEFTRIAXONE 1 G/1
1000 INJECTION, POWDER, FOR SOLUTION INTRAMUSCULAR; INTRAVENOUS ONCE
Status: COMPLETED | OUTPATIENT
Start: 2024-02-18 | End: 2024-02-18

## 2024-02-18 RX ORDER — NITROFURANTOIN 25; 75 MG/1; MG/1
100 CAPSULE ORAL 2 TIMES DAILY
Qty: 14 CAPSULE | Refills: 0 | Status: ACTIVE | OUTPATIENT
Start: 2024-02-18

## 2024-02-18 RX ORDER — SODIUM CHLORIDE, SODIUM LACTATE, POTASSIUM CHLORIDE, AND CALCIUM CHLORIDE .6; .31; .03; .02 G/100ML; G/100ML; G/100ML; G/100ML
1000 INJECTION, SOLUTION INTRAVENOUS ONCE
Status: COMPLETED | OUTPATIENT
Start: 2024-02-18 | End: 2024-02-18

## 2024-02-18 RX ADMIN — LIDOCAINE HYDROCHLORIDE 2.1 ML: 10 INJECTION, SOLUTION INFILTRATION; PERINEURAL at 02:14

## 2024-02-18 RX ADMIN — SODIUM CHLORIDE, POTASSIUM CHLORIDE, SODIUM LACTATE AND CALCIUM CHLORIDE 1000 ML: 600; 310; 30; 20 INJECTION, SOLUTION INTRAVENOUS at 01:43

## 2024-02-18 RX ADMIN — CEFTRIAXONE SODIUM 1000 MG: 1 INJECTION, POWDER, FOR SOLUTION INTRAMUSCULAR; INTRAVENOUS at 02:14

## 2024-02-18 NOTE — PROGRESS NOTES
38 y.o.  EDC  (27 wks)    Pt presents to L&D via [facility] c/o constant sharp abdominal and lower pelvic pain. Pt states that she was grocery shopping at around 4pm when she first noticed the ain. She also states that when she wiped after voiding, she noticed a smear of blood. Pt hasn't gotten PNC this pregnancy yet and gt her ALEC by US from the ED. Pt states she was told she had placenta previa at the time. Reports good FM. Denies LOF. Denies any HA, vision changes or epigastric pain. EFM/TOCO applied, VSS.     Erinn MCRGAW notified of pt's arrival and current status, see chart for orders.    2350: US at bedside    Left message for Erinn MCGRAW regarding POC UA results.    UA results discussed with Erinn MCGRAW, see MAR for orders.    0214: Rocephin IM given. Erinn MCGRAW notified about pt status, discharge orders received.    0243: Patient discharged home with specific instruction to return to L&D/Physician ie.. Bleeding/ROM/decreased FM/labor/concerns for self or baby.  Patient denies questions or concerns regarding POC since arrival and POC to discharge home.  Patient escorted out of hospital by CNA with her son by side by wheelchair.

## 2024-04-04 ENCOUNTER — HOSPITAL ENCOUNTER (EMERGENCY)
Facility: MEDICAL CENTER | Age: 39
End: 2024-04-05
Attending: OBSTETRICS & GYNECOLOGY | Admitting: OBSTETRICS & GYNECOLOGY
Payer: MEDICAID

## 2024-04-04 ENCOUNTER — APPOINTMENT (OUTPATIENT)
Dept: RADIOLOGY | Facility: MEDICAL CENTER | Age: 39
End: 2024-04-04
Attending: MIDWIFE
Payer: MEDICAID

## 2024-04-04 PROBLEM — O09.90 SUPERVISION OF HIGH-RISK PREGNANCY: Status: ACTIVE | Noted: 2024-04-04

## 2024-04-04 PROBLEM — Z98.891 PREVIOUS CESAREAN SECTION: Status: RESOLVED | Noted: 2019-08-27 | Resolved: 2024-04-04

## 2024-04-04 PROBLEM — O09.299 HISTORY OF GESTATIONAL DIABETES IN PRIOR PREGNANCY, CURRENTLY PREGNANT: Status: ACTIVE | Noted: 2022-01-19

## 2024-04-04 PROBLEM — Z86.32 HISTORY OF GESTATIONAL DIABETES IN PRIOR PREGNANCY, CURRENTLY PREGNANT: Status: ACTIVE | Noted: 2022-01-19

## 2024-04-04 LAB
ALBUMIN SERPL BCP-MCNC: 3.3 G/DL (ref 3.2–4.9)
ALBUMIN/GLOB SERPL: 0.9 G/DL
ALP SERPL-CCNC: 104 U/L (ref 30–99)
ALT SERPL-CCNC: 6 U/L (ref 2–50)
ANION GAP SERPL CALC-SCNC: 12 MMOL/L (ref 7–16)
APPEARANCE UR: ABNORMAL
APPEARANCE UR: CLEAR
AST SERPL-CCNC: 10 U/L (ref 12–45)
BACTERIA #/AREA URNS HPF: ABNORMAL /HPF
BILIRUB SERPL-MCNC: 0.2 MG/DL (ref 0.1–1.5)
BILIRUB UR QL STRIP.AUTO: NEGATIVE
BUN SERPL-MCNC: 9 MG/DL (ref 8–22)
CALCIUM ALBUM COR SERPL-MCNC: 9.9 MG/DL (ref 8.5–10.5)
CALCIUM SERPL-MCNC: 9.3 MG/DL (ref 8.5–10.5)
CANDIDA DNA VAG QL PROBE+SIG AMP: POSITIVE
CHLORIDE SERPL-SCNC: 105 MMOL/L (ref 96–112)
CO2 SERPL-SCNC: 22 MMOL/L (ref 20–33)
COLOR UR AUTO: YELLOW
COLOR UR: YELLOW
CREAT SERPL-MCNC: 0.36 MG/DL (ref 0.5–1.4)
EPI CELLS #/AREA URNS HPF: ABNORMAL /HPF
ERYTHROCYTE [DISTWIDTH] IN BLOOD BY AUTOMATED COUNT: 47.7 FL (ref 35.9–50)
FIBRONECTIN FETAL SPEC QL: NEGATIVE
G VAGINALIS DNA VAG QL PROBE+SIG AMP: NEGATIVE
GFR SERPLBLD CREATININE-BSD FMLA CKD-EPI: 134 ML/MIN/1.73 M 2
GLOBULIN SER CALC-MCNC: 3.7 G/DL (ref 1.9–3.5)
GLUCOSE SERPL-MCNC: 100 MG/DL (ref 65–99)
GLUCOSE UR QL STRIP.AUTO: NEGATIVE MG/DL
GLUCOSE UR STRIP.AUTO-MCNC: NEGATIVE MG/DL
HCT VFR BLD AUTO: 34.6 % (ref 37–47)
HGB BLD-MCNC: 11 G/DL (ref 12–16)
HYALINE CASTS #/AREA URNS LPF: ABNORMAL /LPF
KETONES UR QL STRIP.AUTO: ABNORMAL MG/DL
KETONES UR STRIP.AUTO-MCNC: ABNORMAL MG/DL
LEUKOCYTE ESTERASE UR QL STRIP.AUTO: NEGATIVE
LEUKOCYTE ESTERASE UR QL STRIP.AUTO: NEGATIVE
MCH RBC QN AUTO: 26.8 PG (ref 27–33)
MCHC RBC AUTO-ENTMCNC: 31.8 G/DL (ref 32.2–35.5)
MCV RBC AUTO: 84.2 FL (ref 81.4–97.8)
MICRO URNS: ABNORMAL
NITRITE UR QL STRIP.AUTO: NEGATIVE
NITRITE UR QL STRIP.AUTO: NEGATIVE
PH UR STRIP.AUTO: 6 [PH] (ref 5–8)
PH UR STRIP.AUTO: 6 [PH] (ref 5–8)
PLATELET # BLD AUTO: 292 K/UL (ref 164–446)
PMV BLD AUTO: 9.1 FL (ref 9–12.9)
POTASSIUM SERPL-SCNC: 3.4 MMOL/L (ref 3.6–5.5)
PROT SERPL-MCNC: 7 G/DL (ref 6–8.2)
PROT UR QL STRIP: ABNORMAL MG/DL
PROT UR QL STRIP: NEGATIVE MG/DL
RBC # BLD AUTO: 4.11 M/UL (ref 4.2–5.4)
RBC # URNS HPF: ABNORMAL /HPF
RBC UR QL AUTO: NEGATIVE
RBC UR QL AUTO: NEGATIVE
SODIUM SERPL-SCNC: 139 MMOL/L (ref 135–145)
SP GR UR STRIP.AUTO: 1.02
SP GR UR STRIP.AUTO: 1.02 (ref 1–1.03)
T VAGINALIS DNA VAG QL PROBE+SIG AMP: NEGATIVE
UROBILINOGEN UR STRIP.AUTO-MCNC: 1 MG/DL
WBC # BLD AUTO: 11.9 K/UL (ref 4.8–10.8)
WBC #/AREA URNS HPF: ABNORMAL /HPF

## 2024-04-04 PROCEDURE — 87480 CANDIDA DNA DIR PROBE: CPT

## 2024-04-04 PROCEDURE — 81002 URINALYSIS NONAUTO W/O SCOPE: CPT | Mod: XU

## 2024-04-04 PROCEDURE — 87086 URINE CULTURE/COLONY COUNT: CPT

## 2024-04-04 PROCEDURE — 36415 COLL VENOUS BLD VENIPUNCTURE: CPT

## 2024-04-04 PROCEDURE — 59025 FETAL NON-STRESS TEST: CPT

## 2024-04-04 PROCEDURE — 87660 TRICHOMONAS VAGIN DIR PROBE: CPT

## 2024-04-04 PROCEDURE — 81001 URINALYSIS AUTO W/SCOPE: CPT

## 2024-04-04 PROCEDURE — 80053 COMPREHEN METABOLIC PANEL: CPT

## 2024-04-04 PROCEDURE — 87510 GARDNER VAG DNA DIR PROBE: CPT

## 2024-04-04 PROCEDURE — 85027 COMPLETE CBC AUTOMATED: CPT

## 2024-04-04 PROCEDURE — 82731 ASSAY OF FETAL FIBRONECTIN: CPT

## 2024-04-04 PROCEDURE — 76816 OB US FOLLOW-UP PER FETUS: CPT

## 2024-04-04 RX ORDER — ACETAMINOPHEN 325 MG/1
650 TABLET ORAL ONCE
Status: COMPLETED | OUTPATIENT
Start: 2024-04-05 | End: 2024-04-05

## 2024-04-04 RX ORDER — CLOTRIMAZOLE 1 %
1 CREAM WITH APPLICATOR VAGINAL DAILY
Status: DISCONTINUED | OUTPATIENT
Start: 2024-04-04 | End: 2024-04-05 | Stop reason: HOSPADM

## 2024-04-04 ASSESSMENT — ENCOUNTER SYMPTOMS
PSYCHIATRIC NEGATIVE: 1
CONSTITUTIONAL NEGATIVE: 1
GASTROINTESTINAL NEGATIVE: 1

## 2024-04-04 ASSESSMENT — PAIN SCALES - GENERAL: PAINLEVEL: 0 - NO PAIN

## 2024-04-04 ASSESSMENT — FIBROSIS 4 INDEX: FIB4 SCORE: 0.75

## 2024-04-05 ENCOUNTER — TELEPHONE (OUTPATIENT)
Dept: OBGYN | Facility: CLINIC | Age: 39
End: 2024-04-05
Payer: MEDICAID

## 2024-04-05 ENCOUNTER — PATIENT MESSAGE (OUTPATIENT)
Dept: OBGYN | Facility: CLINIC | Age: 39
End: 2024-04-05
Payer: MEDICAID

## 2024-04-05 VITALS
RESPIRATION RATE: 16 BRPM | BODY MASS INDEX: 33.32 KG/M2 | HEIGHT: 65 IN | OXYGEN SATURATION: 96 % | DIASTOLIC BLOOD PRESSURE: 57 MMHG | WEIGHT: 200 LBS | TEMPERATURE: 96.5 F | HEART RATE: 81 BPM | SYSTOLIC BLOOD PRESSURE: 107 MMHG

## 2024-04-05 LAB
AMPHET UR QL SCN: NEGATIVE
BARBITURATES UR QL SCN: NEGATIVE
BENZODIAZ UR QL SCN: NEGATIVE
BZE UR QL SCN: NEGATIVE
CANNABINOIDS UR QL SCN: NEGATIVE
CREAT UR-MCNC: 71.49 MG/DL
FENTANYL UR QL: NEGATIVE
METHADONE UR QL SCN: NEGATIVE
OPIATES UR QL SCN: NEGATIVE
OXYCODONE UR QL SCN: NEGATIVE
PCP UR QL SCN: NEGATIVE
PROPOXYPH UR QL SCN: NEGATIVE
PROT UR-MCNC: 12 MG/DL (ref 0–15)
PROT/CREAT UR: 168 MG/G (ref 10–107)

## 2024-04-05 PROCEDURE — 80307 DRUG TEST PRSMV CHEM ANLYZR: CPT

## 2024-04-05 PROCEDURE — A9270 NON-COVERED ITEM OR SERVICE: HCPCS

## 2024-04-05 PROCEDURE — 82570 ASSAY OF URINE CREATININE: CPT | Mod: XU

## 2024-04-05 PROCEDURE — 700102 HCHG RX REV CODE 250 W/ 637 OVERRIDE(OP)

## 2024-04-05 PROCEDURE — 700105 HCHG RX REV CODE 258: Performed by: MIDWIFE

## 2024-04-05 PROCEDURE — 99284 EMERGENCY DEPT VISIT MOD MDM: CPT

## 2024-04-05 PROCEDURE — 700102 HCHG RX REV CODE 250 W/ 637 OVERRIDE(OP): Performed by: MIDWIFE

## 2024-04-05 PROCEDURE — 84156 ASSAY OF PROTEIN URINE: CPT | Mod: XU

## 2024-04-05 PROCEDURE — A9270 NON-COVERED ITEM OR SERVICE: HCPCS | Performed by: MIDWIFE

## 2024-04-05 RX ORDER — SODIUM CHLORIDE, SODIUM LACTATE, POTASSIUM CHLORIDE, AND CALCIUM CHLORIDE .6; .31; .03; .02 G/100ML; G/100ML; G/100ML; G/100ML
1000 INJECTION, SOLUTION INTRAVENOUS ONCE
Status: COMPLETED | OUTPATIENT
Start: 2024-04-05 | End: 2024-04-05

## 2024-04-05 RX ORDER — ACETAMINOPHEN 500 MG
1000 TABLET ORAL ONCE
Status: COMPLETED | OUTPATIENT
Start: 2024-04-05 | End: 2024-04-05

## 2024-04-05 RX ADMIN — ACETAMINOPHEN 650 MG: 325 TABLET, FILM COATED ORAL at 00:54

## 2024-04-05 RX ADMIN — SODIUM CHLORIDE, POTASSIUM CHLORIDE, SODIUM LACTATE AND CALCIUM CHLORIDE 1000 ML: 600; 310; 30; 20 INJECTION, SOLUTION INTRAVENOUS at 01:30

## 2024-04-05 RX ADMIN — ACETAMINOPHEN 1000 MG: 500 TABLET, FILM COATED ORAL at 08:49

## 2024-04-05 RX ADMIN — CLOTRIMAZOLE 1 APPLICATOR: 1 CREAM VAGINAL at 08:27

## 2024-04-05 ASSESSMENT — PAIN DESCRIPTION - PAIN TYPE
TYPE: ACUTE PAIN
TYPE: ACUTE PAIN

## 2024-04-05 NOTE — DISCHARGE PLANNING
:    Received call from RN stating Pt is being discharged and needs assistance with bus passes.  Pt is here with her four children and does not qualify for MTM.  CHUNG leon RN 5 bus passes.

## 2024-04-05 NOTE — PROGRESS NOTES
0730: Report received from SINTIA Hyman.   0850: SVE performed, pt unchanged from last exam. External os 1-2 cm, internal os is closed.   0859: Dr. Griggs updated on SVE, discharge orders received.   0910: Discharge orders reviewed with patient. All questions and concerns addressed. Pt provided with pregnancy center phone number to establish care. Pt request assistance with transportation for her and four children; message sent to [facility].  0940: Pt provided with bus passes. Pt is therefore discharge in stable condition at this time.

## 2024-04-05 NOTE — ED PROVIDER NOTES
Emergency Obstetric Consultation     Date of Service  2024    Reason for Consultation  Lower abdominal pain, possible contractions    History of Presenting Illness  38 y.o. female who presented 2024 with Reason for consult: contractionsDenies vaginal bleeding and leaking. Reports increase in discharge - brown in color. Denies vaginal itching, odor, burning. Last BM 1 hour ago with normal consistency.  Pt reports very dry skin and inadequate hydration.  Contractions: Onset was yesterday.    Frequency is approximately 60 minutes.  Perceived severity is mild.    Contractions started increasing in severity and frequency at approximately this afternoon around 1600.   Fetal activity: Perceived fetal activity is normal.    Prenatal complications: No prenatal care. Denies alcohol, drug and tobacco use. Hx of  section x3, pre-eclampsia, GDM - denies taking medication, and placenta previa.       Pt reports that she has not obtained prenatal care yet due to insurance reasons.     Review of Systems  Review of Systems   Constitutional: Negative.    Gastrointestinal: Negative.    Genitourinary: Negative.    Psychiatric/Behavioral: Negative.         Obstetric History    OB History    Para Term  AB Living   8 5 5   2 5   SAB IAB Ectopic Molar Multiple Live Births   2       0 5      # Outcome Date GA Lbr Yuan/2nd Weight Sex Delivery Anes PTL Lv   8 Current            7 Term 22 39w0d  4.59 kg (10 lb 1.9 oz) M CS-LTranv Spinal N ALICE   6 Term 19 39w3d  3.81 kg (8 lb 6.4 oz) M CS-LTranv Spinal N ALICE   5 2011              Birth Comments: Pt. states needed D/C   4 Term 06/10/09 39w0d  3.827 kg (8 lb 7 oz) F Vag-Spont EPI N ALICE      Birth Comments: Pt. states epidural was put in but only half of her body was knumb.    3 Term 08 39w0d  3.898 kg (8 lb 9.5 oz) F Vag-Spont EPI  ALICE      Birth Comments: Pt. states had preeclampsia.    2 SAB  4w0d             Birth Comments: Pt.  states no D/C needed.    1 Term      CS-LTranv   ALICE       Gynecologic History  No LMP recorded (lmp unknown). Patient is pregnant.    Medical History  Past Medical History:   Diagnosis Date    Chlamydia positive - (+) treatment Oct 18. DEAN at New OB visit     DEAN neg  DEAN done on pap 14    Heart burn     Not taking any medications; occassionally TUMS.    Hypertension     Does not take medications for hypertension.    Indication for care in labor or delivery     Placenta previa     Preeclampsia     Pregnant     Previous  section      x 3. Needs Repeat section    Psychiatric problem     Anxiety    Urinary tract infection, site not specified        Surgical History   has a past surgical history that includes dilation and curettage (); abdominal exploration (); cholecystectomy; primary c section (2015); repeat c section (N/A, 2019); and repeat c section (Bilateral, 2022).    Family History  family history is not on file.    Social History   reports that she quit smoking about 18 years ago. Her smoking use included cigarettes. She has never used smokeless tobacco. She reports that she does not drink alcohol and does not use drugs.    Medications  Medications Prior to Admission   Medication Sig Dispense Refill Last Dose    nitrofurantoin (MACROBID) 100 MG Cap Take 1 Capsule by mouth 2 times a day. 14 Capsule 0     betamethasone dipropionate (DEL-BETA) 0.05 % Ointment Apply 1 Application topically 2 times a day. (Patient not taking: Reported on 2024) 45 g 0     ibuprofen (MOTRIN) 800 MG Tab Take 1 Tablet by mouth every 8 hours as needed for Mild Pain or Moderate Pain. (Patient not taking: Reported on 2024) 30 Tablet 0     ferrous sulfate 325 (65 Fe) MG tablet Take 1 Tablet by mouth every day. (Patient not taking: Reported on 2024) 30 Tablet 0     docusate sodium (COLACE) 100 MG Cap Take 1 Capsule by mouth 2 times a day. (Patient not taking: Reported on 2024)  60 Capsule 0     Prenatal MV-Min-Fe Fum-FA-DHA (PRENATAL 1 PO) Take  by mouth.          Allergies  No Known Allergies    Physical Exam  Maternal Exam:  Uterine Assessment: Contraction strength is mild.  Contraction duration is -2 minutes. Pt talking through contractions. When palpated during contraction tracing on toco, uterus is soft.   Abdomen: Patient reports no abdominal tenderness. Surgical scars: low transverse.    Fundal tenderness while anna but no other tenderness palpated  Baseline rate: 130.   Variability: moderate (6-25 bpm).    Fetal State Assessment: Category I - tracings are normal.  Cardiovascular:      Rate and Rhythm: Normal rate and regular rhythm.      Heart sounds: Normal heart sounds.   Pulmonary:      Effort: Pulmonary effort is normal.      Breath sounds: Normal breath sounds.     2100 SVE closed/thick/-5 per RN exam    Laboratory  Recent Labs     04/04/24 2115   WBC 11.9*   RBC 4.11*   HEMOGLOBIN 11.0*   HEMATOCRIT 34.6*   MCV 84.2   MCH 26.8*   MCHC 31.8*   RDW 47.7   PLATELETCT 292   MPV 9.1     Recent Labs     04/04/24 2115   SODIUM 139   POTASSIUM 3.4*   CHLORIDE 105   CO2 22   GLUCOSE 100*   BUN 9   CREATININE 0.36*   CALCIUM 9.3          FFN negative           Imaging    US-OB LIMITED GROWTH FOLLOW UP Is the patient pregnant? Yes  Order: [id number]   Status: Final result       Visible to patient: Yes (not seen)       Next appt: None    0 Result Notes  Details    Reading Physician Reading Date Result Priority   Norma Tony M.D.  [phone] 4/4/2024      Narrative & Impression    4/4/2024 9:12 PM     HISTORY/REASON FOR EXAM:  Other - please comment; no pnc, cervical length, growth     TECHNIQUE/EXAM DESCRIPTION: OB limited ultrasound.     COMPARISON:  None     FINDINGS:     Fetal Lie:  Vertex  LMP:  08/13/2023  Clinical ALEC by LMP:  05/19/2024     Placenta (Location):  Anterior right  Placenta Previa: None  Placental Grade: 2/3     Amniotic Fluid Volume:  YONY = 22.83 cm      Fetal Heart Rate:  144 bpm     Cervical Length:  2.94 cm     Fetal Biometry     BPD    8.56 cm, 34 weeks, 4 days, (73 Percent)  HC    31.92 cm, 36 weeks, 0 days, (75 Percent)  AC    33.64 cm, 37 weeks, 4 days, (98 Percent)  Femur Length    6.45 cm, 33 weeks, 3 days, (32 Percent)     EGA by this US:  35 weeks, 3 days  ALEC by this US: 2024  ALEC by 1st US:  May 19, 2024     Estimated Fetal Weight:  2823 grams  EFW Percentile: 97 Percent     IMPRESSION:        1.  Single intrauterine pregnancy of an estimated gestational age of 35 weeks, 3 days with an estimated date of delivery of 2024.       Assessment & Plan  Assessment:  Uterine contractions  Hx pre-eclampsia prior pregnancy  Hx GDM and macrosomic baby prior pregnancy  Hx  section x3  No prenatal care      Plan:  1. Labs: CBC, CMP, GC/CT, vag path, FFN, urine PC ratio, UDS  2. Imaging: Growth scan and CL  3. IVF bolus x1  4. Continue to monitor for worsening contraction pains. Continuous fetal monitoring. Plan to follow up after imaging and labs are resulted.           [place] C.N.M.    24 0101 Addendum    Pre-eclampsia baseline labs are normal. PC ratio pending.     Repeat SVE at 0030 FT/thick/-5. Contractions are not tracing but pt reports continued contractions. No contractions palpating. Per CL of 2.94 cm and FFN negative with closed cervix, pt not likely in  labor. However, she has had small cervical change. Pt lives 1.5 hours away and has been without prenatal care. Will admit to observation to ensure cervical changes do not continue.     Pt does have vaginal candida per vag path results. Clotrimazole is initiated.     Plan for discharge later this morning. Pt is advised she must follow up in the clinic for prenatal care as she needs regular prenatal care.    On recheck at 0900 cervix unchanged. Pt cleared for discharged with return precautions

## 2024-04-05 NOTE — DISCHARGE INSTRUCTIONS
Pre-term Labor (<37 weeks):  Call your physician or return to the hospital if:  You have painless regular contractions more than 4 in one hour.  Your water breaks (remember time and color).  You have menstrual-like cramps, a low dull backache or pressure in your pelvis or back.  Your baby does not move enough to complete the daily kick count (10 movements in 2 hours).  Your baby moves much less often than on the days before or you have not felt your baby move all day.  Please review the MEDICATION LIST section of your AFTER VISIT SUMMARY document.  Take your medication as prescribed     Negative

## 2024-04-05 NOTE — PROGRESS NOTES
0136 Received beside report from Mabel PATRICIO; Patient is a , EDC of  with a gestational age of 33w4d. RN assuming care of patient; all questions answered; patient transferred to S213 with belongings and family, denies needs at this time; vital signs in stable condition; patient educated regarding call light system; call light and patient belongings in reach; patient encouraged to call RN for any needs, wants, desires or concerns. Whiteboard updated.   0150 Patient requested to eat, Daisha notified, orders received patient may eat, orders received to just have a continuous toco.  0300 Orders received to get continuous efm.   0700 Bedside report given to Melissa PATRICIO, assumed care, POC discussed, all questions answered; vital signs stable; care relinquished.

## 2024-04-05 NOTE — PROGRESS NOTES
Late entry due to patient care:     Patient is a  with an EDC of  and GA of 33W 4D. Patient presents complaining of decreased fetal movement, lower belly pain. Patient is unsure if she is having uterine contractions, denies vaginal bleeding or LOF but reports some pink discharge, reports decreased fetal movement. External monitors applied, VSS, patient oriented to room and call light and encouraged to call out with needs.

## 2024-04-06 LAB
BACTERIA UR CULT: NORMAL
SIGNIFICANT IND 70042: NORMAL
SITE SITE: NORMAL
SOURCE SOURCE: NORMAL

## 2024-04-10 ENCOUNTER — TELEPHONE (OUTPATIENT)
Dept: OBGYN | Facility: CLINIC | Age: 39
End: 2024-04-10
Payer: MEDICAID

## 2024-04-10 NOTE — TELEPHONE ENCOUNTER
Issues with epic not allowing to schedule appts. Issue has been brought up to leadership. Pt informed we would call back when issue is resolved

## 2024-04-11 PROBLEM — Z98.891 HISTORY OF CESAREAN DELIVERY: Status: ACTIVE | Noted: 2024-04-11

## 2024-04-22 ENCOUNTER — APPOINTMENT (OUTPATIENT)
Dept: OBGYN | Facility: CLINIC | Age: 39
End: 2024-04-22
Payer: MEDICAID

## 2024-04-22 ENCOUNTER — HOSPITAL ENCOUNTER (OUTPATIENT)
Facility: MEDICAL CENTER | Age: 39
End: 2024-04-22
Attending: NURSE PRACTITIONER
Payer: MEDICAID

## 2024-04-22 ENCOUNTER — INITIAL PRENATAL (OUTPATIENT)
Dept: OBGYN | Facility: CLINIC | Age: 39
End: 2024-04-22
Payer: MEDICAID

## 2024-04-22 VITALS — BODY MASS INDEX: 36.11 KG/M2 | WEIGHT: 217 LBS | SYSTOLIC BLOOD PRESSURE: 118 MMHG | DIASTOLIC BLOOD PRESSURE: 60 MMHG

## 2024-04-22 DIAGNOSIS — O09.523 AMA (ADVANCED MATERNAL AGE) MULTIGRAVIDA 35+, THIRD TRIMESTER: ICD-10-CM

## 2024-04-22 DIAGNOSIS — O09.523 AMA (ADVANCED MATERNAL AGE) MULTIGRAVIDA 35+, THIRD TRIMESTER: Primary | ICD-10-CM

## 2024-04-22 PROCEDURE — 87150 DNA/RNA AMPLIFIED PROBE: CPT

## 2024-04-22 PROCEDURE — 0500F INITIAL PRENATAL CARE VISIT: CPT | Performed by: NURSE PRACTITIONER

## 2024-04-22 PROCEDURE — 87081 CULTURE SCREEN ONLY: CPT

## 2024-04-22 ASSESSMENT — ENCOUNTER SYMPTOMS
NEUROLOGICAL NEGATIVE: 1
EYES NEGATIVE: 1
CARDIOVASCULAR NEGATIVE: 1
PSYCHIATRIC NEGATIVE: 1
MUSCULOSKELETAL NEGATIVE: 1
CONSTITUTIONAL NEGATIVE: 1
RESPIRATORY NEGATIVE: 1
GASTROINTESTINAL NEGATIVE: 1

## 2024-04-22 ASSESSMENT — FIBROSIS 4 INDEX: FIB4 SCORE: 0.5

## 2024-04-22 ASSESSMENT — EDINBURGH POSTNATAL DEPRESSION SCALE (EPDS)
I HAVE FELT SAD OR MISERABLE: NO, NOT AT ALL
THINGS HAVE BEEN GETTING ON TOP OF ME: NO, I HAVE BEEN COPING AS WELL AS EVER
TOTAL SCORE: 0
I HAVE BLAMED MYSELF UNNECESSARILY WHEN THINGS WENT WRONG: NO, NEVER
I HAVE BEEN SO UNHAPPY THAT I HAVE HAD DIFFICULTY SLEEPING: NOT AT ALL
I HAVE BEEN ABLE TO LAUGH AND SEE THE FUNNY SIDE OF THINGS: AS MUCH AS I ALWAYS COULD
I HAVE BEEN ANXIOUS OR WORRIED FOR NO GOOD REASON: NO, NOT AT ALL
I HAVE BEEN SO UNHAPPY THAT I HAVE BEEN CRYING: NO, NEVER
I HAVE LOOKED FORWARD WITH ENJOYMENT TO THINGS: AS MUCH AS I EVER DID
I HAVE FELT SCARED OR PANICKY FOR NO GOOD REASON: NO, NOT AT ALL
THE THOUGHT OF HARMING MYSELF HAS OCCURRED TO ME: NEVER

## 2024-04-22 NOTE — LETTER
"Count Your Baby's Movements  Another step to a healthy delivery    Angelina Rader  [facility]  Dept: [phone]    How Many Weeks Pregnant? 36w1d    Date to Begin Countin24              How to use this chart    One way for your physician to keep track of your baby's health is by knowing how often the baby moves (or \"kicks\") in your womb.  You can help your physician to do this by using this chart every day.    Every day, you should see how many hours it takes for your baby to move 10 times.  Start in the morning, as soon as you get up.    First, write down the time your baby moves until you get to 10.  Check off one box every time your baby moves until you get to 10.  Write down the time you finished counting in the last column.  Total how long it took to count up all 10 movements.  Finally, fill in the box that shows how long this took.  After counting 10 movements, you no longer have to count any more that day.  The next morning, just start counting again as soon as you get up.    What should you call a \"movement\"?  It is hard to say, because it will feel different from one mother to another and from one pregnancy to the next.  The important thing is that you count the movements the same way throughout your pregnancy.  If you have more questions, you should ask your physician.    Count carefully every day!  SAMPLE:  Week 28    How many hours did it take to feel 10 movements?       Start  Time     1     2     3     4     5     6     7     8     9     10   Finish Time   Mon 8:20           11:40                  Sat               Sun                 IMPORTANT: You should contact your physician if it takes more than two hours for you to feel 10 movements.  Each morning, write down the time and start to count the movements of your baby.  Keep track by checking off one box every time you feel one movement.  When you " "have felt 10 \"kicks\", write down the time you finished counting in the last column.  Then fill in the   box (over the check tracie) for the number of hours it took.  Be sure to read the complete instructions on the previous page.            "

## 2024-04-22 NOTE — PROGRESS NOTES
Subjective     S:  Angelina Rader is a 38 y.o. NA female  @ She is 36w1d with an ALEC of Estimated Date of Delivery: 24 based off of LMP who presents for her new OB exam.        Several ER visits in this pregnancy. She reports no complaints. Reports positive FM, VB, LOF, or cramping.  Denies dysuria, vaginal DC.  Pt is  and lives with family. FOB is involved and supportive. She is currently working as homemaker, no heavy lifting, no chemical exposure.  Pregnancy is unplanned but welcomed.  Too late for genetic screening.    OB/GYN Hx:   SAB in ,  x2, SAB in , C/S x 3, last pregnancy: macrosomia, prediabetic. She desires BTL, understands that it may be too late in her pregnancy for Medicaid to cover.  She desires pp BTL, BTL consent signed   History of fibroids, cervical, uterine or ovarian surgery: D&C in , C/S x 3  Last pap smear was unknown  History of HSV I or II in self or partner: no  History of Thyroid problems: no  History of STIs in self or partner: no  History of tobacco, drug or alcohol use: no  History of depression or anxiety no    O:    Vitals:    24 0825   BP: 118/60   Weight: 217 lb    See H&P Prenatal Physical and Prenatal Vitals.              A:   1.  IUP @ 36w1d ALEC: Estimated Date of Delivery: 24 per LMP         2.  S=D        3.    Patient Active Problem List    Diagnosis Date Noted    AMA (advanced maternal age) multigravida 35+, third trimester 2024    History of  delivery x 3 2024    Supervision of high-risk pregnancy 2024    History of macrosomia in infant in prior pregnancy, currently pregnant 2022         P:  1.  GC/CT, vaginal pathogen negative on 2024. Pap deferred to PP.         2.  Prenatal labs and UDS ordered - lab slip given        3.  Discussed diet and exercise during pregnancy. Encouraged good nutrition, and daily exercise including walking or swimming. Discussed expected weight gain during  pregnancy.              4.  Discussed adequate hydration during pregnancy.        5.  NOB packet given        6.  Return to office in 1 wks        7.  GBS today        8.  Pregnancy guide provided        9.  1 hr GTT ordered.       10. Referral for R C/S sent    HPI    Review of Systems   Constitutional: Negative.    HENT: Negative.     Eyes: Negative.    Respiratory: Negative.     Cardiovascular: Negative.    Gastrointestinal: Negative.    Genitourinary: Negative.    Musculoskeletal: Negative.    Skin: Negative.    Neurological: Negative.    Endo/Heme/Allergies: Negative.    Psychiatric/Behavioral: Negative.                Objective     /60   Wt 217 lb   LMP 2023   BMI 36.11 kg/m²      Physical Exam  Vitals and nursing note reviewed.   Constitutional:       Appearance: She is well-developed.   Cardiovascular:      Rate and Rhythm: Normal rate and regular rhythm.      Heart sounds: Normal heart sounds.   Pulmonary:      Effort: Pulmonary effort is normal.      Breath sounds: Normal breath sounds.   Abdominal:      Palpations: Abdomen is soft.   Genitourinary:     Vagina: Normal.      Comments: Uterus enlarged, c/w 39 wk ga  Musculoskeletal:         General: Normal range of motion.      Cervical back: Normal range of motion and neck supple.   Skin:     General: Skin is warm and dry.   Neurological:      Mental Status: She is alert and oriented to person, place, and time.      Deep Tendon Reflexes: Reflexes are normal and symmetric.   Psychiatric:         Behavior: Behavior normal.         Thought Content: Thought content normal.         Judgment: Judgment normal.      Comments: EPDS = 0                             Assessment & Plan        1. AMA (advanced maternal age) multigravida 35+, third trimester    - GRP B STREP, BY PCR (MITCHELL BROTH); Future  - GLUCOSE 1HR GESTATIONAL; Future  - Induction of Labor and ; Future

## 2024-04-22 NOTE — PROGRESS NOTES
NOB today  LMP: 8/13/23  Last pap: 2014 pap  GT/CT and vag path done normal and negative  Pharmacy confirmed  On PNV  Genetic screening   EPDS= 0  c/o of irregular contractions. Pt states she has a yeast infection and taking medication  GBS today  COURTNEY given and explained  Has had no prenatal care for this pregnancy

## 2024-04-23 ENCOUNTER — APPOINTMENT (OUTPATIENT)
Dept: ADMISSIONS | Facility: MEDICAL CENTER | Age: 39
End: 2024-04-23
Attending: OBSTETRICS & GYNECOLOGY
Payer: MEDICAID

## 2024-04-24 LAB — GP B STREP DNA SPEC QL NAA+PROBE: NEGATIVE

## 2024-04-26 ENCOUNTER — PRE-ADMISSION TESTING (OUTPATIENT)
Dept: ADMISSIONS | Facility: MEDICAL CENTER | Age: 39
End: 2024-04-26
Attending: OBSTETRICS & GYNECOLOGY
Payer: MEDICAID

## 2024-04-29 ENCOUNTER — HOSPITAL ENCOUNTER (EMERGENCY)
Facility: MEDICAL CENTER | Age: 39
End: 2024-04-29
Attending: OBSTETRICS & GYNECOLOGY | Admitting: OBSTETRICS & GYNECOLOGY
Payer: MEDICAID

## 2024-04-29 VITALS
DIASTOLIC BLOOD PRESSURE: 79 MMHG | HEIGHT: 65 IN | TEMPERATURE: 97 F | BODY MASS INDEX: 36.15 KG/M2 | WEIGHT: 217 LBS | HEART RATE: 92 BPM | OXYGEN SATURATION: 97 % | SYSTOLIC BLOOD PRESSURE: 132 MMHG

## 2024-04-29 LAB — A1 MICROGLOB PLACENTAL VAG QL: NEGATIVE

## 2024-04-29 PROCEDURE — 84112 EVAL AMNIOTIC FLUID PROTEIN: CPT

## 2024-04-29 PROCEDURE — 99282 EMERGENCY DEPT VISIT SF MDM: CPT

## 2024-04-29 PROCEDURE — 59025 FETAL NON-STRESS TEST: CPT

## 2024-04-29 PROCEDURE — 99283 EMERGENCY DEPT VISIT LOW MDM: CPT | Mod: GC | Performed by: STUDENT IN AN ORGANIZED HEALTH CARE EDUCATION/TRAINING PROGRAM

## 2024-04-29 ASSESSMENT — FIBROSIS 4 INDEX: FIB4 SCORE: 0.5

## 2024-04-29 NOTE — PROGRESS NOTES
1552 Term labor precautions reviewed, pt verbalized understanding. All questions and concerns addressed at this time. Pt ambulated off unit in stable condition with belongings in hand.

## 2024-04-29 NOTE — ED PROVIDER NOTES
"LABOR AND DELIVERY TRIAGE NOTE    PATIENT ID:  NAME:  Angelina Rader  MRN:               [id number]  YOB: 1985     38 y.o. female  at 37w1d.    Subjective: Pt reports around 12:45 while sitting at the park she felt a gush of fluid and her pants were soaked which led her to believe that she had broken her water.  She denies consistent contractions but does report every couple hours she does feel a strong contraction.  She denies vaginal bleeding and affirms reassuring fetal movement.  She reports that her pregnancy has been uncomplicated but she has had both late and incomplete prenatal care.  She reports she has had a mild headache since 4 AM this morning, denies vision changes, nausea, vomiting, dysuria, diarrhea or constipation, rashes, or swelling.  She does report that she has been experiencing heartburn x 4 days but denies cardiac chest pain and associated shortness of breath.    She believes that she has some family members with a bleeding disorder but elaborates that \"nobody tells me anything\" but that a distant aunt had a brain aneurysm.  She is unsure if there is a family history of clotting disorders.  She does report that during 1 pregnancy they potentially discussed transfusion but she is also unsure of this and does not recall any further details.    Pregnancy complicated by late prenatal care, incomplete prenatal care.  She did not get 1 hour GTT. She grand multiparity     positive for contractions q2 hours  negative pain   positive for LOF around 12:45pm  negative for vaginal bleeding  positive for fetal movement    PNL:  Blood Type O+, Rubella immune, HIV neg, TrepAb neg, HBsAg NR, Hep C NR, GC/CT neg/neg  1 HR GTT: Not performed, but A1c 5.6% on 2024 which estimates her avg glucose of 114, however she had a 7.2% A1c on 22  GBS negative 24      ROS: Per HPI    PMHx:   Past Medical History:   Diagnosis Date    Hypertension     Does not take medications for " hypertension.        OB History    Para Term  AB Living   8 5 5   2 5   SAB IAB Ectopic Molar Multiple Live Births   2       0 5      # Outcome Date GA Lbr Yuan/2nd Weight Sex Delivery Anes PTL Lv   8 Current            7 Term 22 39w0d  4.59 kg (10 lb 1.9 oz) M CS-LTranv Spinal N ALICE   6 Term 19 39w3d  3.81 kg (8 lb 6.4 oz) M CS-LTranv Spinal N ALICE   5 Term      CS-LTranv   ALICE   4 SAB               Birth Comments: Pt. states needed D/C   3 Term 06/10/09 39w0d  3.827 kg (8 lb 7 oz) F Vag-Spont EPI N ALICE      Birth Comments: Pt. states epidural was put in but only half of her body was knumb.    2 Term 08 39w0d  3.898 kg (8 lb 9.5 oz) F Vag-Spont EPI  ALICE      Birth Comments: Pt. states had preeclampsia.    1 SAB  4w0d             Birth Comments: Pt. states no D/C needed.        PSHx:  Past Surgical History:   Procedure Laterality Date    REPEAT C SECTION Bilateral 2022    Procedure:  SECTION, REPEAT;  Surgeon: Regino Cook M.D.;  Location: SURGERY LABOR AND DELIVERY;  Service: Labor and Delivery    REPEAT C SECTION N/A 2019    Procedure:  SECTION, REPEAT;  Surgeon: Regino Cook M.D.;  Location: LABOR AND DELIVERY;  Service: Labor and Delivery    PRIMARY C SECTION  2015    Procedure: PRIMARY C SECTION;  Surgeon: Regino Cook M.D.;  Location: LABOR AND DELIVERY;  Service:     ABDOMINAL EXPLORATION      DILATION AND CURETTAGE      due to sab    CHOLECYSTECTOMY         Social Hx:  Social History     Tobacco Use    Smoking status: Former     Current packs/day: 0.00     Types: Cigarettes     Quit date: 2005     Years since quittin.4    Smokeless tobacco: Never    Tobacco comments:     Pt. states quit when she found out she was pregnant.    Vaping Use    Vaping Use: Never used   Substance Use Topics    Alcohol use: No    Drug use: No         Medications:  No current facility-administered medications on file prior to  "encounter.     Current Outpatient Medications on File Prior to Encounter   Medication Sig Dispense Refill    nitrofurantoin (MACROBID) 100 MG Cap Take 1 Capsule by mouth 2 times a day. (Patient not taking: Reported on 2024) 14 Capsule 0    betamethasone dipropionate (DEL-BETA) 0.05 % Ointment Apply 1 Application topically 2 times a day. (Patient not taking: Reported on 2024) 45 g 0    ibuprofen (MOTRIN) 800 MG Tab Take 1 Tablet by mouth every 8 hours as needed for Mild Pain or Moderate Pain. (Patient not taking: Reported on 2024) 30 Tablet 0    ferrous sulfate 325 (65 Fe) MG tablet Take 1 Tablet by mouth every day. (Patient not taking: Reported on 2024) 30 Tablet 0    docusate sodium (COLACE) 100 MG Cap Take 1 Capsule by mouth 2 times a day. (Patient not taking: Reported on 2024) 60 Capsule 0    Prenatal MV-Min-Fe Fum-FA-DHA (PRENATAL 1 PO) Take  by mouth.         Allergies:  No Known Allergies      Objective:    Vitals:    24 1307 24 1312 24 1314   BP:  132/79    Pulse:  88 92   Temp:  36.1 °C (97 °F)    TempSrc:  Temporal    SpO2:   97%   Weight: 98.4 kg (217 lb)     Height: 1.651 m (5' 5\")       Temp (24hrs), Av.1 °C (97 °F), Min:36.1 °C (97 °F), Max:36.1 °C (97 °F)    General: No acute distress, resting comfortably in bed.  HEENT: normocephalic, nontraumatic, PERRLA, EOMI  Cardiovascular: Heart RRR with no murmurs, rubs or gallops. Distal Pulses 2+  Respiratory: symmetric chest expansion, lungs CTAB, with no wheezes, rales, rhonci  Abdomen: gravid, nontender  Musculoskeletal: ALCAZAR spontaneously  Neuro: non focal with no numbness, tingling or changes in sensation    Cervix:  closed/thick/-4 per SINTIA Nair  [place]: Uterine Contractions Q30 minutes.   FHRM: Baseline 145bpm, moderate variability, + accels, no decels    Sterile Speculum Exam: No pooling, cervix consistent with grand multiparity, no bleeding appreciated, wnl discharge noted.     Labs:    Latest Reference " Range & Units 24 14:30   AmniSure ROM Negative  Negative       Assessment: 38 y.o. female  at 37w1d presents with LOF around 12:45pm. She reports infrequent contractions roughly once every 2 hours. She denies vaginal bleeding and affirms reassuring fetal movement. FHT showed a baseline of 145bpm and moderate variability. Amnisure was negative. The patient will be discharged with return precautions discussed and counseling on the importance of follow up; schedulers were messaged within [facility] to call the patient for an appt.     #SIUP @ 37w1d by w6d   #R/o ROM  -Cont. Prenatal care with [facility]   -Follow up late this coming week or early next week urged, message sent to marko  -Cat 1 FHT  -Cervical exam closed/thick/high  -Urged patient to take prenatal  -OK to use TUMS as needed  -Amnisure negative  -Cont. Kick count, 30 min of activity per day and promoted hydration    #Fetal status   - FHT: cat 1    #GBS status  -GBS: negative     #Incomplete Prenatal Labs  Blood Type O+, Rubella immune, HIV neg, TrepAb neg, HBsAg NR, Hep C NR, GC/CT neg/neg  1 HR GTT: Not performed, but A1c 5.6% on 2024 which estimates her avg glucose of 114, however she had a 7.2% A1c on 22  GBS negative 24    #healthcare maintenance   -TdaP 22 per records available       - Patient is cleared to return home with family. Encouraged to see MD/DO for increased painful uterine contractions @ 3-5, vaginal bleeding, loss of fluid, or other serious symptoms.    Future Appointments         Provider Department Center    2024 1:30 PM (Arrive by 1:15 PM) Kati Ponce M.D. [facility]        Discussed case with Dr. Arshad, [facility] Attending. Case was discussed and attending agreed with plan prior to discharge of patient.      Bernard Ko M.D.  PGY-1, [facility]

## 2024-04-29 NOTE — PROGRESS NOTES
EDC 2024 EGA 37.1    Pt presented to L&D for c/o LOF, denies regular, painful UC's, vaginal bleeding, states + fetal movement. EFM and TOCO applied. Speculum exam performed- small amount of pooling noted, bandarn slide collected to send to lab. SVE closed. Dr. Ko aware pt here. Will wait fern results for POC.

## 2024-05-12 ENCOUNTER — HOSPITAL ENCOUNTER (INPATIENT)
Facility: MEDICAL CENTER | Age: 39
LOS: 3 days | End: 2024-05-15
Attending: OBSTETRICS & GYNECOLOGY | Admitting: OBSTETRICS & GYNECOLOGY
Payer: MEDICAID

## 2024-05-12 ENCOUNTER — ANESTHESIA EVENT (OUTPATIENT)
Dept: OBGYN | Facility: MEDICAL CENTER | Age: 39
End: 2024-05-12
Payer: MEDICAID

## 2024-05-12 ENCOUNTER — ANESTHESIA (OUTPATIENT)
Dept: OBGYN | Facility: MEDICAL CENTER | Age: 39
End: 2024-05-12
Payer: MEDICAID

## 2024-05-12 LAB
ABO GROUP BLD: NORMAL
BASOPHILS # BLD AUTO: 0.4 % (ref 0–1.8)
BASOPHILS # BLD: 0.04 K/UL (ref 0–0.12)
BLD GP AB SCN SERPL QL: NORMAL
EOSINOPHIL # BLD AUTO: 0.11 K/UL (ref 0–0.51)
EOSINOPHIL NFR BLD: 1 % (ref 0–6.9)
ERYTHROCYTE [DISTWIDTH] IN BLOOD BY AUTOMATED COUNT: 49.1 FL (ref 35.9–50)
ERYTHROCYTE [DISTWIDTH] IN BLOOD BY AUTOMATED COUNT: 51.4 FL (ref 35.9–50)
HCT VFR BLD AUTO: 30.1 % (ref 37–47)
HCT VFR BLD AUTO: 34.4 % (ref 37–47)
HGB BLD-MCNC: 10.6 G/DL (ref 12–16)
HGB BLD-MCNC: 9 G/DL (ref 12–16)
IMM GRANULOCYTES # BLD AUTO: 0.15 K/UL (ref 0–0.11)
IMM GRANULOCYTES NFR BLD AUTO: 1.4 % (ref 0–0.9)
LYMPHOCYTES # BLD AUTO: 2.19 K/UL (ref 1–4.8)
LYMPHOCYTES NFR BLD: 20.8 % (ref 22–41)
MCH RBC QN AUTO: 24.9 PG (ref 27–33)
MCH RBC QN AUTO: 25.1 PG (ref 27–33)
MCHC RBC AUTO-ENTMCNC: 29.9 G/DL (ref 32.2–35.5)
MCHC RBC AUTO-ENTMCNC: 30.8 G/DL (ref 32.2–35.5)
MCV RBC AUTO: 80.8 FL (ref 81.4–97.8)
MCV RBC AUTO: 83.8 FL (ref 81.4–97.8)
MONOCYTES # BLD AUTO: 0.54 K/UL (ref 0–0.85)
MONOCYTES NFR BLD AUTO: 5.1 % (ref 0–13.4)
NEUTROPHILS # BLD AUTO: 7.51 K/UL (ref 1.82–7.42)
NEUTROPHILS NFR BLD: 71.3 % (ref 44–72)
NRBC # BLD AUTO: 0.03 K/UL
NRBC BLD-RTO: 0.3 /100 WBC (ref 0–0.2)
PLATELET # BLD AUTO: 229 K/UL (ref 164–446)
PLATELET # BLD AUTO: 266 K/UL (ref 164–446)
PMV BLD AUTO: 9.1 FL (ref 9–12.9)
PMV BLD AUTO: 9.7 FL (ref 9–12.9)
RBC # BLD AUTO: 3.59 M/UL (ref 4.2–5.4)
RBC # BLD AUTO: 4.26 M/UL (ref 4.2–5.4)
RH BLD: NORMAL
T PALLIDUM AB SER QL IA: NORMAL
WBC # BLD AUTO: 10.5 K/UL (ref 4.8–10.8)
WBC # BLD AUTO: 13.6 K/UL (ref 4.8–10.8)

## 2024-05-12 PROCEDURE — 59515 CESAREAN DELIVERY: CPT | Performed by: OBSTETRICS & GYNECOLOGY

## 2024-05-12 RX ORDER — HYDROMORPHONE HYDROCHLORIDE 1 MG/ML
0.4 INJECTION, SOLUTION INTRAMUSCULAR; INTRAVENOUS; SUBCUTANEOUS
Status: DISCONTINUED | OUTPATIENT
Start: 2024-05-12 | End: 2024-05-12 | Stop reason: HOSPADM

## 2024-05-12 RX ORDER — DEXAMETHASONE SODIUM PHOSPHATE 4 MG/ML
INJECTION, SOLUTION INTRA-ARTICULAR; INTRALESIONAL; INTRAMUSCULAR; INTRAVENOUS; SOFT TISSUE PRN
Status: DISCONTINUED | OUTPATIENT
Start: 2024-05-12 | End: 2024-05-12 | Stop reason: SURG

## 2024-05-12 RX ORDER — SODIUM CHLORIDE, SODIUM LACTATE, POTASSIUM CHLORIDE, CALCIUM CHLORIDE 600; 310; 30; 20 MG/100ML; MG/100ML; MG/100ML; MG/100ML
INJECTION, SOLUTION INTRAVENOUS CONTINUOUS
Status: DISCONTINUED | OUTPATIENT
Start: 2024-05-12 | End: 2024-05-15 | Stop reason: HOSPADM

## 2024-05-12 RX ORDER — DIPHENHYDRAMINE HCL 25 MG
25 TABLET ORAL EVERY 6 HOURS PRN
Status: DISCONTINUED | OUTPATIENT
Start: 2024-05-13 | End: 2024-05-15 | Stop reason: HOSPADM

## 2024-05-12 RX ORDER — BUPIVACAINE HYDROCHLORIDE 7.5 MG/ML
INJECTION, SOLUTION INTRASPINAL PRN
Status: DISCONTINUED | OUTPATIENT
Start: 2024-05-12 | End: 2024-05-12 | Stop reason: SURG

## 2024-05-12 RX ORDER — OXYCODONE HCL 5 MG/5 ML
10 SOLUTION, ORAL ORAL
Status: DISCONTINUED | OUTPATIENT
Start: 2024-05-12 | End: 2024-05-12 | Stop reason: HOSPADM

## 2024-05-12 RX ORDER — METHYLERGONOVINE MALEATE 0.2 MG/ML
INJECTION INTRAVENOUS
Status: ACTIVE
Start: 2024-05-12 | End: 2024-05-12

## 2024-05-12 RX ORDER — MIDAZOLAM HYDROCHLORIDE 1 MG/ML
1 INJECTION INTRAMUSCULAR; INTRAVENOUS
Status: DISCONTINUED | OUTPATIENT
Start: 2024-05-12 | End: 2024-05-12 | Stop reason: HOSPADM

## 2024-05-12 RX ORDER — OXYCODONE HCL 5 MG/5 ML
5 SOLUTION, ORAL ORAL
Status: DISCONTINUED | OUTPATIENT
Start: 2024-05-12 | End: 2024-05-12 | Stop reason: HOSPADM

## 2024-05-12 RX ORDER — OXYCODONE HYDROCHLORIDE 5 MG/1
5 TABLET ORAL EVERY 4 HOURS PRN
Status: DISCONTINUED | OUTPATIENT
Start: 2024-05-13 | End: 2024-05-15 | Stop reason: HOSPADM

## 2024-05-12 RX ORDER — METHYLERGONOVINE MALEATE 0.2 MG/ML
0.2 INJECTION INTRAVENOUS ONCE
Status: ACTIVE | OUTPATIENT
Start: 2024-05-12 | End: 2024-05-13

## 2024-05-12 RX ORDER — SODIUM CHLORIDE, SODIUM GLUCONATE, SODIUM ACETATE, POTASSIUM CHLORIDE AND MAGNESIUM CHLORIDE 526; 502; 368; 37; 30 MG/100ML; MG/100ML; MG/100ML; MG/100ML; MG/100ML
1500 INJECTION, SOLUTION INTRAVENOUS ONCE
Status: COMPLETED | OUTPATIENT
Start: 2024-05-12 | End: 2024-05-12

## 2024-05-12 RX ORDER — CITRIC ACID/SODIUM CITRATE 334-500MG
30 SOLUTION, ORAL ORAL ONCE
Status: COMPLETED | OUTPATIENT
Start: 2024-05-12 | End: 2024-05-12

## 2024-05-12 RX ORDER — ACETAMINOPHEN 500 MG
1000 TABLET ORAL EVERY 6 HOURS PRN
Status: DISCONTINUED | OUTPATIENT
Start: 2024-05-16 | End: 2024-05-15 | Stop reason: HOSPADM

## 2024-05-12 RX ORDER — METOCLOPRAMIDE HYDROCHLORIDE 5 MG/ML
10 INJECTION INTRAMUSCULAR; INTRAVENOUS ONCE
Status: COMPLETED | OUTPATIENT
Start: 2024-05-12 | End: 2024-05-12

## 2024-05-12 RX ORDER — KETOROLAC TROMETHAMINE 15 MG/ML
15 INJECTION, SOLUTION INTRAMUSCULAR; INTRAVENOUS EVERY 6 HOURS
Status: DISCONTINUED | OUTPATIENT
Start: 2024-05-12 | End: 2024-05-13

## 2024-05-12 RX ORDER — MORPHINE SULFATE 0.5 MG/ML
INJECTION, SOLUTION EPIDURAL; INTRATHECAL; INTRAVENOUS PRN
Status: DISCONTINUED | OUTPATIENT
Start: 2024-05-12 | End: 2024-05-12 | Stop reason: SURG

## 2024-05-12 RX ORDER — DIPHENHYDRAMINE HYDROCHLORIDE 50 MG/ML
12.5 INJECTION INTRAMUSCULAR; INTRAVENOUS EVERY 6 HOURS PRN
Status: ACTIVE | OUTPATIENT
Start: 2024-05-12 | End: 2024-05-13

## 2024-05-12 RX ORDER — LABETALOL HYDROCHLORIDE 5 MG/ML
5 INJECTION, SOLUTION INTRAVENOUS
Status: DISCONTINUED | OUTPATIENT
Start: 2024-05-12 | End: 2024-05-12 | Stop reason: HOSPADM

## 2024-05-12 RX ORDER — HYDROMORPHONE HYDROCHLORIDE 1 MG/ML
0.1 INJECTION, SOLUTION INTRAMUSCULAR; INTRAVENOUS; SUBCUTANEOUS
Status: DISCONTINUED | OUTPATIENT
Start: 2024-05-12 | End: 2024-05-12 | Stop reason: HOSPADM

## 2024-05-12 RX ORDER — ONDANSETRON 4 MG/1
4 TABLET, ORALLY DISINTEGRATING ORAL EVERY 6 HOURS PRN
Status: DISCONTINUED | OUTPATIENT
Start: 2024-05-13 | End: 2024-05-15 | Stop reason: HOSPADM

## 2024-05-12 RX ORDER — ONDANSETRON 2 MG/ML
INJECTION INTRAMUSCULAR; INTRAVENOUS PRN
Status: DISCONTINUED | OUTPATIENT
Start: 2024-05-12 | End: 2024-05-12 | Stop reason: SURG

## 2024-05-12 RX ORDER — OXYTOCIN 10 [USP'U]/ML
INJECTION, SOLUTION INTRAMUSCULAR; INTRAVENOUS PRN
Status: DISCONTINUED | OUTPATIENT
Start: 2024-05-12 | End: 2024-05-12 | Stop reason: SURG

## 2024-05-12 RX ORDER — HYDROMORPHONE HYDROCHLORIDE 1 MG/ML
0.4 INJECTION, SOLUTION INTRAMUSCULAR; INTRAVENOUS; SUBCUTANEOUS
Status: ACTIVE | OUTPATIENT
Start: 2024-05-12 | End: 2024-05-13

## 2024-05-12 RX ORDER — MEPERIDINE HYDROCHLORIDE 25 MG/ML
12.5 INJECTION INTRAMUSCULAR; INTRAVENOUS; SUBCUTANEOUS
Status: DISCONTINUED | OUTPATIENT
Start: 2024-05-12 | End: 2024-05-12 | Stop reason: HOSPADM

## 2024-05-12 RX ORDER — ACETAMINOPHEN 500 MG
1000 TABLET ORAL EVERY 6 HOURS
Qty: 24 TABLET | Refills: 0 | Status: DISCONTINUED | OUTPATIENT
Start: 2024-05-13 | End: 2024-05-15 | Stop reason: HOSPADM

## 2024-05-12 RX ORDER — HYDRALAZINE HYDROCHLORIDE 20 MG/ML
5 INJECTION INTRAMUSCULAR; INTRAVENOUS
Status: DISCONTINUED | OUTPATIENT
Start: 2024-05-12 | End: 2024-05-12 | Stop reason: HOSPADM

## 2024-05-12 RX ORDER — KETOROLAC TROMETHAMINE 15 MG/ML
INJECTION, SOLUTION INTRAMUSCULAR; INTRAVENOUS PRN
Status: DISCONTINUED | OUTPATIENT
Start: 2024-05-12 | End: 2024-05-12 | Stop reason: SURG

## 2024-05-12 RX ORDER — IBUPROFEN 800 MG/1
800 TABLET ORAL EVERY 8 HOURS
Qty: 9 TABLET | Refills: 0 | Status: DISCONTINUED | OUTPATIENT
Start: 2024-05-13 | End: 2024-05-13

## 2024-05-12 RX ORDER — OXYCODONE HYDROCHLORIDE 5 MG/1
5 TABLET ORAL EVERY 4 HOURS PRN
Status: ACTIVE | OUTPATIENT
Start: 2024-05-12 | End: 2024-05-13

## 2024-05-12 RX ORDER — OXYCODONE HYDROCHLORIDE 10 MG/1
10 TABLET ORAL EVERY 4 HOURS PRN
Status: ACTIVE | OUTPATIENT
Start: 2024-05-12 | End: 2024-05-13

## 2024-05-12 RX ORDER — METOPROLOL TARTRATE 1 MG/ML
1 INJECTION, SOLUTION INTRAVENOUS
Status: DISCONTINUED | OUTPATIENT
Start: 2024-05-12 | End: 2024-05-12 | Stop reason: HOSPADM

## 2024-05-12 RX ORDER — DIPHENHYDRAMINE HYDROCHLORIDE 50 MG/ML
25 INJECTION INTRAMUSCULAR; INTRAVENOUS EVERY 6 HOURS PRN
Status: ACTIVE | OUTPATIENT
Start: 2024-05-12 | End: 2024-05-13

## 2024-05-12 RX ORDER — IBUPROFEN 800 MG/1
800 TABLET ORAL EVERY 8 HOURS PRN
Status: DISCONTINUED | OUTPATIENT
Start: 2024-05-16 | End: 2024-05-13

## 2024-05-12 RX ORDER — EPHEDRINE SULFATE 50 MG/ML
5 INJECTION, SOLUTION INTRAVENOUS
Status: DISCONTINUED | OUTPATIENT
Start: 2024-05-12 | End: 2024-05-12 | Stop reason: HOSPADM

## 2024-05-12 RX ORDER — SODIUM CHLORIDE, SODIUM LACTATE, POTASSIUM CHLORIDE, CALCIUM CHLORIDE 600; 310; 30; 20 MG/100ML; MG/100ML; MG/100ML; MG/100ML
INJECTION, SOLUTION INTRAVENOUS ONCE
Status: ACTIVE | OUTPATIENT
Start: 2024-05-12 | End: 2024-05-13

## 2024-05-12 RX ORDER — SODIUM CHLORIDE, SODIUM GLUCONATE, SODIUM ACETATE, POTASSIUM CHLORIDE AND MAGNESIUM CHLORIDE 526; 502; 368; 37; 30 MG/100ML; MG/100ML; MG/100ML; MG/100ML; MG/100ML
INJECTION, SOLUTION INTRAVENOUS
Status: DISCONTINUED | OUTPATIENT
Start: 2024-05-12 | End: 2024-05-12 | Stop reason: SURG

## 2024-05-12 RX ORDER — ACETAMINOPHEN 500 MG
1000 TABLET ORAL EVERY 6 HOURS
Status: DISPENSED | OUTPATIENT
Start: 2024-05-12 | End: 2024-05-13

## 2024-05-12 RX ORDER — ONDANSETRON 2 MG/ML
4 INJECTION INTRAMUSCULAR; INTRAVENOUS EVERY 6 HOURS PRN
Status: DISCONTINUED | OUTPATIENT
Start: 2024-05-13 | End: 2024-05-15 | Stop reason: HOSPADM

## 2024-05-12 RX ORDER — HYDROMORPHONE HYDROCHLORIDE 1 MG/ML
0.2 INJECTION, SOLUTION INTRAMUSCULAR; INTRAVENOUS; SUBCUTANEOUS
Status: ACTIVE | OUTPATIENT
Start: 2024-05-12 | End: 2024-05-13

## 2024-05-12 RX ORDER — HYDROMORPHONE HYDROCHLORIDE 1 MG/ML
0.2 INJECTION, SOLUTION INTRAMUSCULAR; INTRAVENOUS; SUBCUTANEOUS
Status: DISCONTINUED | OUTPATIENT
Start: 2024-05-12 | End: 2024-05-12 | Stop reason: HOSPADM

## 2024-05-12 RX ORDER — HALOPERIDOL 5 MG/ML
1 INJECTION INTRAMUSCULAR
Status: DISCONTINUED | OUTPATIENT
Start: 2024-05-12 | End: 2024-05-12 | Stop reason: HOSPADM

## 2024-05-12 RX ORDER — EPHEDRINE SULFATE 50 MG/ML
10 INJECTION, SOLUTION INTRAVENOUS
Status: ACTIVE | OUTPATIENT
Start: 2024-05-12 | End: 2024-05-13

## 2024-05-12 RX ORDER — CEFAZOLIN SODIUM 1 G/3ML
INJECTION, POWDER, FOR SOLUTION INTRAMUSCULAR; INTRAVENOUS PRN
Status: DISCONTINUED | OUTPATIENT
Start: 2024-05-12 | End: 2024-05-12 | Stop reason: SURG

## 2024-05-12 RX ORDER — CEFAZOLIN SODIUM 1 G/3ML
2 INJECTION, POWDER, FOR SOLUTION INTRAMUSCULAR; INTRAVENOUS ONCE
Status: DISCONTINUED | OUTPATIENT
Start: 2024-05-12 | End: 2024-05-12 | Stop reason: HOSPADM

## 2024-05-12 RX ORDER — DIPHENHYDRAMINE HYDROCHLORIDE 50 MG/ML
25 INJECTION INTRAMUSCULAR; INTRAVENOUS EVERY 6 HOURS PRN
Status: DISCONTINUED | OUTPATIENT
Start: 2024-05-13 | End: 2024-05-15 | Stop reason: HOSPADM

## 2024-05-12 RX ORDER — VITAMIN A ACETATE, BETA CAROTENE, ASCORBIC ACID, CHOLECALCIFEROL, .ALPHA.-TOCOPHEROL ACETATE, DL-, THIAMINE MONONITRATE, RIBOFLAVIN, NIACINAMIDE, PYRIDOXINE HYDROCHLORIDE, FOLIC ACID, CYANOCOBALAMIN, CALCIUM CARBONATE, FERROUS FUMARATE, ZINC OXIDE, CUPRIC OXIDE 3080; 12; 120; 400; 1; 1.84; 3; 20; 22; 920; 25; 200; 27; 10; 2 [IU]/1; UG/1; MG/1; [IU]/1; MG/1; MG/1; MG/1; MG/1; MG/1; [IU]/1; MG/1; MG/1; MG/1; MG/1; MG/1
1 TABLET, FILM COATED ORAL
Status: DISCONTINUED | OUTPATIENT
Start: 2024-05-13 | End: 2024-05-15 | Stop reason: HOSPADM

## 2024-05-12 RX ORDER — SODIUM CHLORIDE, SODIUM LACTATE, POTASSIUM CHLORIDE, CALCIUM CHLORIDE 600; 310; 30; 20 MG/100ML; MG/100ML; MG/100ML; MG/100ML
INJECTION, SOLUTION INTRAVENOUS PRN
Status: DISCONTINUED | OUTPATIENT
Start: 2024-05-12 | End: 2024-05-15 | Stop reason: HOSPADM

## 2024-05-12 RX ORDER — DIPHENHYDRAMINE HYDROCHLORIDE 50 MG/ML
12.5 INJECTION INTRAMUSCULAR; INTRAVENOUS
Status: DISCONTINUED | OUTPATIENT
Start: 2024-05-12 | End: 2024-05-12 | Stop reason: HOSPADM

## 2024-05-12 RX ORDER — DOCUSATE SODIUM 100 MG/1
100 CAPSULE, LIQUID FILLED ORAL 2 TIMES DAILY PRN
Status: DISCONTINUED | OUTPATIENT
Start: 2024-05-12 | End: 2024-05-15 | Stop reason: HOSPADM

## 2024-05-12 RX ORDER — ONDANSETRON 2 MG/ML
4 INJECTION INTRAMUSCULAR; INTRAVENOUS
Status: DISCONTINUED | OUTPATIENT
Start: 2024-05-12 | End: 2024-05-12 | Stop reason: HOSPADM

## 2024-05-12 RX ORDER — ONDANSETRON 2 MG/ML
4 INJECTION INTRAMUSCULAR; INTRAVENOUS EVERY 6 HOURS PRN
Status: DISPENSED | OUTPATIENT
Start: 2024-05-12 | End: 2024-05-13

## 2024-05-12 RX ORDER — OXYCODONE HYDROCHLORIDE 10 MG/1
10 TABLET ORAL EVERY 4 HOURS PRN
Status: DISCONTINUED | OUTPATIENT
Start: 2024-05-13 | End: 2024-05-15 | Stop reason: HOSPADM

## 2024-05-12 RX ORDER — OXYTOCIN 10 [USP'U]/ML
10 INJECTION, SOLUTION INTRAMUSCULAR; INTRAVENOUS
Status: DISCONTINUED | OUTPATIENT
Start: 2024-05-12 | End: 2024-05-15 | Stop reason: HOSPADM

## 2024-05-12 RX ADMIN — SODIUM CHLORIDE, SODIUM GLUCONATE, SODIUM ACETATE, POTASSIUM CHLORIDE AND MAGNESIUM CHLORIDE 1500 ML: 526; 502; 368; 37; 30 INJECTION, SOLUTION INTRAVENOUS at 09:45

## 2024-05-12 RX ADMIN — SODIUM CITRATE AND CITRIC ACID MONOHYDRATE 30 ML: 334; 500 SOLUTION ORAL at 09:56

## 2024-05-12 RX ADMIN — FENTANYL CITRATE 15 MCG: 50 INJECTION, SOLUTION INTRAMUSCULAR; INTRAVENOUS at 10:31

## 2024-05-12 RX ADMIN — METOCLOPRAMIDE 10 MG: 5 INJECTION, SOLUTION INTRAMUSCULAR; INTRAVENOUS at 09:48

## 2024-05-12 RX ADMIN — ONDANSETRON 4 MG: 2 INJECTION INTRAMUSCULAR; INTRAVENOUS at 11:08

## 2024-05-12 RX ADMIN — FAMOTIDINE 20 MG: 10 INJECTION, SOLUTION INTRAVENOUS at 09:55

## 2024-05-12 RX ADMIN — SODIUM CHLORIDE, SODIUM GLUCONATE, SODIUM ACETATE, POTASSIUM CHLORIDE AND MAGNESIUM CHLORIDE: 526; 502; 368; 37; 30 INJECTION, SOLUTION INTRAVENOUS at 10:13

## 2024-05-12 RX ADMIN — ACETAMINOPHEN 1000 MG: 500 TABLET, FILM COATED ORAL at 13:59

## 2024-05-12 RX ADMIN — KETOROLAC TROMETHAMINE 15 MG: 15 INJECTION, SOLUTION INTRAMUSCULAR; INTRAVENOUS at 11:34

## 2024-05-12 RX ADMIN — KETOROLAC TROMETHAMINE 15 MG: 15 INJECTION, SOLUTION INTRAMUSCULAR; INTRAVENOUS at 17:58

## 2024-05-12 RX ADMIN — PHENYLEPHRINE HYDROCHLORIDE 0.5 MCG/KG/MIN: 10 INJECTION INTRAVENOUS at 10:32

## 2024-05-12 RX ADMIN — DEXAMETHASONE SODIUM PHOSPHATE 4 MG: 4 INJECTION INTRA-ARTICULAR; INTRALESIONAL; INTRAMUSCULAR; INTRAVENOUS; SOFT TISSUE at 10:39

## 2024-05-12 RX ADMIN — BUPIVACAINE HYDROCHLORIDE IN DEXTROSE 12 MG: 7.5 INJECTION, SOLUTION SUBARACHNOID at 10:31

## 2024-05-12 RX ADMIN — OXYTOCIN 125 ML/HR: 10 INJECTION, SOLUTION INTRAMUSCULAR; INTRAVENOUS at 12:51

## 2024-05-12 RX ADMIN — MORPHINE SULFATE 150 MCG: 0.5 INJECTION, SOLUTION EPIDURAL; INTRATHECAL; INTRAVENOUS at 10:31

## 2024-05-12 RX ADMIN — OXYTOCIN 20 UNITS: 10 INJECTION, SOLUTION INTRAMUSCULAR; INTRAVENOUS at 11:00

## 2024-05-12 RX ADMIN — SODIUM CHLORIDE, SODIUM GLUCONATE, SODIUM ACETATE, POTASSIUM CHLORIDE AND MAGNESIUM CHLORIDE: 526; 502; 368; 37; 30 INJECTION, SOLUTION INTRAVENOUS at 11:00

## 2024-05-12 RX ADMIN — ONDANSETRON 4 MG: 2 INJECTION INTRAMUSCULAR; INTRAVENOUS at 17:04

## 2024-05-12 RX ADMIN — CEFAZOLIN 2 G: 1 INJECTION, POWDER, FOR SOLUTION INTRAMUSCULAR; INTRAVENOUS at 10:36

## 2024-05-12 ASSESSMENT — LIFESTYLE VARIABLES
HAVE PEOPLE ANNOYED YOU BY CRITICIZING YOUR DRINKING: NO
EVER_SMOKED: NEVER
DOES PATIENT WANT TO STOP DRINKING: NO
TOTAL SCORE: 0
EVER FELT BAD OR GUILTY ABOUT YOUR DRINKING: NO
TOTAL SCORE: 0
ON A TYPICAL DAY WHEN YOU DRINK ALCOHOL HOW MANY DRINKS DO YOU HAVE: 0
AVERAGE NUMBER OF DAYS PER WEEK YOU HAVE A DRINK CONTAINING ALCOHOL: 0
HOW MANY TIMES IN THE PAST YEAR HAVE YOU HAD 5 OR MORE DRINKS IN A DAY: 0
HAVE YOU EVER FELT YOU SHOULD CUT DOWN ON YOUR DRINKING: NO
EVER HAD A DRINK FIRST THING IN THE MORNING TO STEADY YOUR NERVES TO GET RID OF A HANGOVER: NO
TOTAL SCORE: 0
ALCOHOL_USE: NO
CONSUMPTION TOTAL: NEGATIVE

## 2024-05-12 ASSESSMENT — PAIN SCALES - GENERAL: PAIN_LEVEL: 0

## 2024-05-12 ASSESSMENT — PAIN DESCRIPTION - PAIN TYPE
TYPE: ACUTE PAIN
TYPE: ACUTE PAIN;SURGICAL PAIN

## 2024-05-12 ASSESSMENT — FIBROSIS 4 INDEX: FIB4 SCORE: 0.5

## 2024-05-12 NOTE — ANESTHESIA POSTPROCEDURE EVALUATION
Patient: Angelina Rader    Procedure Summary       Date: 24 Room / Location: [facility] OR 02 / [facility]GERY LAB[facility] DELIVERY    Anesthesia Start: 1013 Anesthesia Stop: 1148    Procedure: REPEAT  SECTION (Bilateral: Abdomen) Diagnosis:        delivery delivered      (Repeat  Section)    Surgeons: Dino Mancera M.D. Responsible Provider: Luis Lindsey M.D.    Anesthesia Type: spinal ASA Status: 2            Final Anesthesia Type: spinal  Last vitals  BP   Blood Pressure: 130/85    Temp   36.3 °C (97.3 °F)    Pulse   89   Resp   18    SpO2   91 %      Anesthesia Post Evaluation    Patient location during evaluation: PACU  Patient participation: complete - patient participated  Level of consciousness: awake and alert  Pain score: 0    Airway patency: patent  Anesthetic complications: no  Cardiovascular status: hemodynamically stable  Respiratory status: acceptable  Hydration status: euvolemic    PONV: none          No notable events documented.     Nurse Pain Score: 0 (NPRS)

## 2024-05-12 NOTE — ANESTHESIA TIME REPORT
Anesthesia Start and Stop Event Times       Date Time Event    5/12/2024 0939 Ready for Procedure     1013 Anesthesia Start     1148 Anesthesia Stop          Responsible Staff  05/12/24      Name Role Begin End    Luis Lindsey M.D. Anesth 1013 1148          Overtime Reason:  no overtime (within assigned shift)    Comments:

## 2024-05-12 NOTE — CARE PLAN
The patient is Stable - Low risk of patient condition declining or worsening    Shift Goals  Clinical Goals: Delivery via Repeat C/S  Patient Goals: Delivery; Healthy mom, healthy baby  Family Goals: Support and education    Progress made toward(s) clinical / shift goals:  Support and education provided to pt and family during pre-op, intra-op, and post op.     Patient is not progressing towards the following goals:

## 2024-05-12 NOTE — PROGRESS NOTES
Pt arrived to room 329 from L&D via gurney transport. Bedside report received from SINTIA Parikh. Lochia scant and rubra, without clot expressed; fundus firm and midline. Terry catheter and bilateral SCDs, in place. Last bag of Pitocin is infusing at 125 mL/hr. Pt oriented to room, call light, pain management options, paperwork, kim care, and pad changes. Updated on POC and safety precautions. Upper side rails up x2, bed on lowest position, call light within reach. Questions answered and they verbalized understanding.

## 2024-05-12 NOTE — OR SURGEON
Immediate Post OP Note    PreOp Diagnosis: Previous  section x 3, term pregnancy insufficient prenatal care      PostOp Diagnosis: Same plus fetal macrosomia      Procedure(s):  REPEAT  SECTION    Surgeon(s):  Dino Mancera M.D.    Anesthesiologist/Type of Anesthesia:  Anesthesiologist: Luis Lindsey M.D./* No anesthesia type entered *    Surgical Staff:  Circulator: Kati Perez R.N.  Scrub Person: Ana Mckeon  L&JUAN Baby  Nurse: Nataliia Yao R.N.    Specimens removed if any:  * No specimens in log *    Estimated Blood Loss: 600 cc    Findings: Large amounts of amniotic fluid, Apgar scores 8 and 9 cephalic presentation bladder adhesed to the anterior uterus very thin lower uterine segment    Complications: None        2024 11:56 AM Dino Mancera M.D.

## 2024-05-12 NOTE — ANESTHESIA PROCEDURE NOTES
Spinal Block    Date/Time: 5/12/2024 10:28 AM    Performed by: Luis Lindsey M.D.  Authorized by: Luis Lindsey M.D.    Patient Location:  OR  Start Time:  5/12/2024 10:28 AM  End Time:  5/12/2024 10:31 AM  Reason for Block: primary anesthetic    patient identified, IV checked, site marked, risks and benefits discussed, surgical consent, monitors and equipment checked, pre-op evaluation and timeout performed    Patient Position:  Sitting  Prep: ChloraPrep, patient draped and sterile technique    Monitoring:  Blood pressure, continuous pulse oximetry and heart rate  Approach:  Midline  Location:  L3-4  Injection Technique:  Single-shot  Skin infiltration:  Lidocaine  Strength:  1%  Dose:  3ml  Needle Type:  Pencan  Needle Gauge:  25 G  CSF flowing pre/post injection:  Yes  Sensory Level:  T4

## 2024-05-12 NOTE — H&P
History and physical;    Angelina Rader is a 38 y.o. female  at 39w0d.  Patient has had minimal prenatal care with only 1 visit she has a history of previous  section x 3 and she is here for scheduled repeat  section    Patient Active Problem List    Diagnosis Date Noted    Indication for care in labor or delivery 2024    AMA (advanced maternal age) multigravida 35+, third trimester 2024    History of  delivery x 3 2024    Supervision of high-risk pregnancy 2024    History of macrosomia in infant in prior pregnancy, currently pregnant 2022       Review of systems; denies vaginal bleeding, leakage of fluid, uterine contractions, fever chills or abdominal pain  Past Medical History:   Diagnosis Date    Hypertension     Does not take medications for hypertension.     Past Surgical History:   Procedure Laterality Date    REPEAT C SECTION Bilateral 2022    Procedure:  SECTION, REPEAT;  Surgeon: Regino Cook M.D.;  Location: SURGERY LABOR AND DELIVERY;  Service: Labor and Delivery    REPEAT C SECTION N/A 2019    Procedure:  SECTION, REPEAT;  Surgeon: Regino Cook M.D.;  Location: LABOR AND DELIVERY;  Service: Labor and Delivery    PRIMARY C SECTION  2015    Procedure: PRIMARY C SECTION;  Surgeon: Regino Cook M.D.;  Location: LABOR AND DELIVERY;  Service:     ABDOMINAL EXPLORATION      DILATION AND CURETTAGE      due to sab    CHOLECYSTECTOMY       Patient has no known allergies.  Social History     Socioeconomic History    Marital status:      Spouse name: Not on file    Number of children: Not on file    Years of education: Not on file    Highest education level: Not on file   Occupational History    Not on file   Tobacco Use    Smoking status: Former     Current packs/day: 0.00     Types: Cigarettes     Quit date: 2005     Years since quittin.4    Smokeless tobacco: Never    Tobacco  "comments:     Pt. states quit when she found out she was pregnant.    Vaping Use    Vaping Use: Never used   Substance and Sexual Activity    Alcohol use: No    Drug use: No    Sexual activity: Not Currently     Partners: Male     Comment: None   Other Topics Concern    Not on file   Social History Narrative    Not on file     Social Determinants of Health     Financial Resource Strain: Not on file   Food Insecurity: Not on file   Transportation Needs: Not on file   Physical Activity: Not on file   Stress: Not on file   Social Connections: Not on file   Intimate Partner Violence: Not on file   Housing Stability: Not on file         Physical examination;  Alert and oriented x3  Gen.-well-developed well-nourished female in no apparent distress  HEENT-normocephalic, nontraumatic,EOMI,PERRLA  /71   Pulse 92   Temp 36 °C (96.8 °F) (Temporal)   Resp 18   Ht 1.651 m (5' 5\")   Wt 97.5 kg (215 lb)   LMP 2023   SpO2 94%   BMI 35.78 kg/m²   Skin is warm and dry  Back-negative for CVA tenderness  Cardiovascular-regular rate and rhythm, normal S1-S2 no murmurs gallops  Lungs-clear to auscultation bilaterally  Abdomen-nondistended positive bowel sounds soft nontender without masses or hepatosplenomegaly  Cervix-not examined  Extremities without cyanosis clubbing or edema  Neurologic grossly intact    Labs;  See preoperative labs        Impression;  IUP AT 39w0d  Previous  section x 3  Likely undiagnosed type 2 diabetes  Insufficient prenatal care    Plan;  Repeat  section  Patient is been counseled with the nurse present we discussed the risks of surgery to include risk of pain bleeding infection damage to all internal organs including but not limited to bowel intestines bladder uterus tubes ovaries nerves blood vessel skin and baby possible wound hematoma seroma or infection possible need to open incision and drain fluid.  Possible blood transfusion with inherent risk of AIDS hepatitis patient " has consented blood transfusion as needed we also discussed the possibility of hysterectomy and emergent situation.  Patient has agreed to all of these as needed.  All questions were answered in detail      Dino Mancera MD

## 2024-05-12 NOTE — PROGRESS NOTES
0735: Pt is a  at 39.0 weeks gestation presenting for a scheduled repeat  section. EFM/TOCO applied, oriented to room, call light within reach, POC discussed. Pt reports +FM, no regular ctx, LOF, VB, HA/vision changes/RUG pain/nausea or vomiting. Care assumed at this time with pt in stable condition.     1305:Transported to  via [place] in stable condition with viable infant male in arms, personal belongings at side.     1330: Report given to Argelia PATRICIO., PP POC discussed, care. ID bands verified with 2 nurses. Care relinquished at this time with pt in stable condition.

## 2024-05-12 NOTE — ANESTHESIA PREPROCEDURE EVALUATION
Case: [id number] Date/Time: 24    Procedure: REPEAT  SECTION    Pre-op diagnosis:       PRIOR C SECTION      39+ 0 WEEKS    Location: [facility] OR 01 / [facility]ERY LABOR AND DELIVERY    Surgeons: Dino Mancera M.D.            Relevant Problems   No relevant active problems       Physical Exam    Airway   Mallampati: II  TM distance: >3 FB  Neck ROM: full       Cardiovascular - normal exam  Rhythm: regular  Rate: normal  (-) murmur     Dental - normal exam           Pulmonary - normal exam  Breath sounds clear to auscultation     Abdominal    Neurological - normal exam                   Anesthesia Plan    ASA 2       Plan - spinal   Neuraxial block will be primary anesthetic                Postoperative Plan: Postoperative administration of opioids is intended.    Pertinent diagnostic labs and testing reviewed    Informed Consent:    Anesthetic plan and risks discussed with patient.

## 2024-05-13 ENCOUNTER — TELEPHONE (OUTPATIENT)
Dept: OBGYN | Facility: CLINIC | Age: 39
End: 2024-05-13
Payer: MEDICAID

## 2024-05-13 RX ORDER — FERROUS SULFATE 325(65) MG
325 TABLET ORAL
Status: DISCONTINUED | OUTPATIENT
Start: 2024-05-13 | End: 2024-05-15 | Stop reason: HOSPADM

## 2024-05-13 RX ORDER — IBUPROFEN 800 MG/1
800 TABLET ORAL EVERY 8 HOURS PRN
Status: DISCONTINUED | OUTPATIENT
Start: 2024-05-16 | End: 2024-05-15 | Stop reason: HOSPADM

## 2024-05-13 RX ORDER — IBUPROFEN 800 MG/1
800 TABLET ORAL EVERY 8 HOURS
Status: DISCONTINUED | OUTPATIENT
Start: 2024-05-13 | End: 2024-05-15 | Stop reason: HOSPADM

## 2024-05-13 RX ORDER — SIMETHICONE 125 MG
125 TABLET,CHEWABLE ORAL 3 TIMES DAILY PRN
Status: DISCONTINUED | OUTPATIENT
Start: 2024-05-13 | End: 2024-05-15 | Stop reason: HOSPADM

## 2024-05-13 RX ADMIN — SIMETHICONE 125 MG: 125 TABLET, CHEWABLE ORAL at 13:50

## 2024-05-13 RX ADMIN — FERROUS SULFATE TAB 325 MG (65 MG ELEMENTAL FE) 325 MG: 325 (65 FE) TAB at 08:21

## 2024-05-13 RX ADMIN — PRENATAL WITH FERROUS FUM AND FOLIC ACID 1 TABLET: 3080; 920; 120; 400; 22; 1.84; 3; 20; 10; 1; 12; 200; 27; 25; 2 TABLET ORAL at 08:21

## 2024-05-13 RX ADMIN — IBUPROFEN 800 MG: 800 TABLET, FILM COATED ORAL at 13:36

## 2024-05-13 RX ADMIN — DOCUSATE SODIUM 100 MG: 100 CAPSULE, LIQUID FILLED ORAL at 08:22

## 2024-05-13 RX ADMIN — ACETAMINOPHEN 1000 MG: 500 TABLET, FILM COATED ORAL at 08:22

## 2024-05-13 RX ADMIN — ACETAMINOPHEN 1000 MG: 500 TABLET, FILM COATED ORAL at 17:11

## 2024-05-13 RX ADMIN — SIMETHICONE 125 MG: 125 TABLET, CHEWABLE ORAL at 20:06

## 2024-05-13 RX ADMIN — ACETAMINOPHEN 1000 MG: 500 TABLET, FILM COATED ORAL at 02:26

## 2024-05-13 RX ADMIN — IBUPROFEN 800 MG: 800 TABLET, FILM COATED ORAL at 21:56

## 2024-05-13 RX ADMIN — DOCUSATE SODIUM 100 MG: 100 CAPSULE, LIQUID FILLED ORAL at 20:06

## 2024-05-13 RX ADMIN — ACETAMINOPHEN 1000 MG: 500 TABLET, FILM COATED ORAL at 23:33

## 2024-05-13 ASSESSMENT — EDINBURGH POSTNATAL DEPRESSION SCALE (EPDS)
I HAVE BEEN ANXIOUS OR WORRIED FOR NO GOOD REASON: NO, NOT AT ALL
THINGS HAVE BEEN GETTING ON TOP OF ME: NO, I HAVE BEEN COPING AS WELL AS EVER
I HAVE BEEN SO UNHAPPY THAT I HAVE BEEN CRYING: NO, NEVER
I HAVE FELT SAD OR MISERABLE: NO, NOT AT ALL
THE THOUGHT OF HARMING MYSELF HAS OCCURRED TO ME: NEVER
I HAVE FELT SCARED OR PANICKY FOR NO GOOD REASON: NO, NOT AT ALL
I HAVE LOOKED FORWARD WITH ENJOYMENT TO THINGS: AS MUCH AS I EVER DID
I HAVE BEEN ABLE TO LAUGH AND SEE THE FUNNY SIDE OF THINGS: AS MUCH AS I ALWAYS COULD
I HAVE BEEN SO UNHAPPY THAT I HAVE HAD DIFFICULTY SLEEPING: NOT AT ALL
I HAVE BLAMED MYSELF UNNECESSARILY WHEN THINGS WENT WRONG: NO, NEVER

## 2024-05-13 ASSESSMENT — PAIN DESCRIPTION - PAIN TYPE
TYPE: ACUTE PAIN;SURGICAL PAIN
TYPE: SURGICAL PAIN
TYPE: ACUTE PAIN
TYPE: SURGICAL PAIN
TYPE: ACUTE PAIN;SURGICAL PAIN
TYPE: ACUTE PAIN;SURGICAL PAIN

## 2024-05-13 NOTE — PROGRESS NOTES
Report received from Malathi PATRICIO this am.  Assumed care. The patient is awake and sitting on bed. Discussed plan of care. Assessment done. Medicated for pain. Encouraged ambulation. Call light is within reach. Encouraged to call if with needs. Will check at intervals.    @ 1410: The patient did showered this am. Pressure dressing is dry but told patient that the pressure dressing needs to be removed. She is not ready yet for me to take it off. Advised patient to call this RN so I can help her with the removal of the pressure dressing.     @ 1658: The patient was seen and cleared. This RN, verified thru text message with Erica .    @ 1815: Removed pressure dressing. The patient have a staples that are intact. Inter dry applied over the incision site.

## 2024-05-13 NOTE — DISCHARGE PLANNING
Assessment Peds/PICU        Reason for Referral: Poor prenatal care  Child’s Diagnosis: None    Mother of the Child: Angelina Rader  Contact Information: [phone]  Father of the Child: Joseph Juarez  Contact Information:   Sibling names & ages:16 F, 15 F, 9F, 5M, 2M    Address: [address] [place] [address] [place], [place]  Type of Living Situation: 1 story home  Who lives in the home: Mother, Father, 6 children  Needs lodging:N/A  Has transportation:Yes    Father’s employer: None  Mother Employer:None  Covered on Insurance:Medicaid FFS  Child’s School:     Financial Hardship/food insecurity:  None  Services used prior to admit: Medicaid, [employer], [place] Support    PCP: [facility]  Other specialists:  DME/HH prior to admit:    CPS History: Denies  Psychiatric History: Denies  Domestic Violence History: Denies both Current and Hx  Drug/ETOH History: Denies    Support System: Spouse Joseph, Close family friends  Coping:     Feel well informed: Yes  Happy with care: Yes  Questions/concerns:Patient plans to get her     Resources Provided: Helpful resources list, Postpartum Resources   Referrals Made: None    Ongoing Plan: MOB explained that she moved from [place] to [place] and could not get her medicaid switched to [place] Medicaid and that is why she did not have consistent prenatal care. This is her 6th child and her only concern is MOB to f/u with her [place] for a new car seat. She does have a care seat already, but would like a newer one. She had no other concerns.

## 2024-05-13 NOTE — CARE PLAN
The patient is Stable - Low risk of patient condition declining or worsening    Shift Goals  Clinical Goals: Early mobilization, firm fundus. normal lochia  Patient Goals: pain control  Family Goals: support and baby bonding    Progress made toward(s) clinical / shift goals:    Problem: Knowledge Deficit - Postpartum  Goal: Patient will verbalize and demonstrate understanding of self and infant care  Outcome: Progressing  Note: Discussed care plan with patient and answered all of her questions. She demonstrated understanding     Problem: Psychosocial - Postpartum  Goal: Patient will verbalize and demonstrate effective bonding and parenting behavior  Outcome: Progressing  Note: She is bonding well with infant and bottle feedings are going well     Problem: Early Mobilization - Post Surgery  Goal: Early mobilization post surgery  Outcome: Progressing  Note: Patient was able to mobilize and walk to bathroom with minimal discomfort.

## 2024-05-13 NOTE — CARE PLAN
The patient is Stable - Low risk of patient condition declining or worsening    Shift Goals  Clinical Goals: Lochia WDL  Patient Goals: pain control  Family Goals: support and baby bonding    Progress made toward(s) clinical / shift goals:      Problem: Altered Physiologic Condition  Goal: Patient physiologically stable as evidenced by normal lochia, palpable uterine involution and vitals within normal limits  Outcome: Progressing  Note: Lochia remains scant to light without clots expressed; fundus firm and midline.      Problem: Pain - Standard  Goal: Alleviation of pain or a reduction in pain to the patient’s comfort goal  Outcome: Progressing  Note: Pain has been controlled through scheduled meds.        Patient is not progressing towards the following goals:

## 2024-05-13 NOTE — PROGRESS NOTES
"Post-Partum Progress Note    Angelina Rader is a 38 y.o.  on post-op day #1 following repeat C/S:    Subjective:   Pt reports overall feeling well. Pain is tolerable with PO pain medication.  Pt is ambulating in her room and has voided spontaneously.  She had nausea and emesis soon after surgery yesterday and self-restricted to CLD. She reports increased appetite this morning and looks forward to trial of regular diet. She has not passed flatus or had a BM.  Reports her bleeding is appropriate.  Breast feeding: attempted yesterday but had concerns of empty breasts, supplementing with bottles. Plans to try breastfeeding and looking forward to seeing Lactation Consultant today.      Patient Vitals for the past 24 hrs:   BP Temp Temp src Pulse Resp SpO2 Height Weight   24 0554 115/72 37 °C (98.6 °F) Temporal 86 16 94 % -- --   24 0300 -- -- -- 91 18 94 % -- --   24 0200 114/68 36.9 °C (98.4 °F) Temporal 94 18 93 % -- --   24 0115 -- -- -- 84 16 96 % -- --   24 0000 -- -- -- 87 17 94 % -- --   24 2330 -- -- -- 92 18 96 % -- --   24 2215 -- -- -- 89 16 95 % -- --   24 2152 117/69 37.4 °C (99.4 °F) Temporal 91 17 93 % -- --   24 -- -- -- 99 17 93 % -- --   24 1759 119/73 36.8 °C (98.2 °F) Temporal 85 16 90 % -- --   24 1705 -- -- -- 88 18 92 % -- --   24 1523 -- -- -- 94 18 93 % -- --   24 1330 -- -- -- 92 18 95 % -- --   24 1314 130/85 36.3 °C (97.3 °F) Temporal 89 18 91 % -- --   24 1245 -- -- -- 86 16 -- -- --   24 1230 -- 36.7 °C (98 °F) Temporal 90 16 -- -- --   24 1215 123/68 -- -- 95 16 96 % -- --   24 1200 119/67 -- -- 96 18 95 % -- --   24 1145 109/54 -- -- 96 18 92 % -- --   24 0800 125/71 36 °C (96.8 °F) Temporal 92 18 94 % -- --   24 0730 -- -- -- -- -- -- 1.651 m (5' 5\") 97.5 kg (215 lb)         Intake/Output Summary (Last 24 hours) at 2024 0655  Last data filed at " 5/13/2024 0531  Gross per 24 hour   Intake 1703.33 ml   Output 1400 ml   Net 303.33 ml     Gen: Alert, conversant, NAD  CV: RRR, nl S1 and S2, cap refill <2 sec  Resp: Unlabored, symmetric chest rise, clear to auscultation bilaterally  Abd: soft, ND, appropriately tender to palpation, no rebound/guarding, +BS; fundus palpable below umbilcus   Incision: pressure dressing in place  Ext: NT, no edema bilaterally    Meds:   Current Facility-Administered Medications:     ferrous sulfate tablet 325 mg, 325 mg, Oral, QDAY with Breakfast, Pratibha Arshad M.D.    lactated ringers (LR) infusion, , Intravenous, Continuous, Dino Mancera M.D., Held at 05/12/24 0800    LR infusion, , Intravenous, Once, Dino Mancera M.D.    oxytocin (Pitocin) infusion bolus (for post delivery), 20 Units, Intravenous, Once **FOLLOWED BY** oxytocin (Pitocin) infusion (for post delivery), 125 mL/hr, Intravenous, Continuous, Dino Mancera M.D., Last Rate: 125 mL/hr at 05/12/24 1251, 125 mL/hr at 05/12/24 1251    oxytocin (Pitocin) injection 10 Units, 10 Units, Intramuscular, Once PRN, Dino Mancera M.D.    methylergonovine (Methergine) injection 0.2 mg, 0.2 mg, Intramuscular, Once, Dino Mancera M.D.    acetaminophen (Tylenol) tablet 1,000 mg, 1,000 mg, Oral, Q6HR, Luis Lindsey M.D., 1,000 mg at 05/13/24 0226    ketorolac (Toradol) 15 MG/ML injection 15 mg, 15 mg, Intravenous, Q6HR, Luis Lindsey M.D., 15 mg at 05/12/24 1758    oxyCODONE immediate-release (Roxicodone) tablet 5 mg, 5 mg, Oral, Q4HRS PRN, Luis Lindsey M.D.    oxyCODONE immediate release (Roxicodone) tablet 10 mg, 10 mg, Oral, Q4HRS PRN, Luis Lindsey M.D.    HYDROmorphone (Dilaudid) injection 0.2 mg, 0.2 mg, Intravenous, Q2HRS PRN, Luis Lindsey M.D.    HYDROmorphone (Dilaudid) injection 0.4 mg, 0.4 mg, Intravenous, Q2HRS PRN, Luis Lindsey M.D.    ePHEDrine injection 10 mg, 10 mg, Intravenous, Q5 MIN PRN, Luis Lindsey M.D.    ondansetron  (Zofran) syringe/vial injection 4 mg, 4 mg, Intravenous, Q6HRS PRN, Luis Lindsey M.D., 4 mg at 05/12/24 1704    diphenhydrAMINE (Benadryl) injection 12.5 mg, 12.5 mg, Intravenous, Q6HRS PRN, Luis Lindsey M.D.    diphenhydrAMINE (Benadryl) injection 12.5 mg, 12.5 mg, Intravenous, Q6HRS PRN **OR** diphenhydrAMINE (Benadryl) injection 25 mg, 25 mg, Intravenous, Q6HRS PRN **OR** naloxone HCl (Narcan) 20 mg in  mL infusion, 0.4 mg/hr, Intravenous, Q6HRS PRN, Luis Lindsey M.D.    lactated ringers infusion, , Intravenous, Continuous, Luis Lindsey M.D.    lactated ringers infusion, , Intravenous, PRN, Vickie Proctor, D.O.    ibuprofen (Motrin) tablet 800 mg, 800 mg, Oral, Q8HRS **FOLLOWED BY** [START ON 5/16/2024] ibuprofen (Motrin) tablet 800 mg, 800 mg, Oral, Q8HRS PRN, Nalbenja Proctor, D.O.    acetaminophen (Tylenol) tablet 1,000 mg, 1,000 mg, Oral, Q6HRS **FOLLOWED BY** [START ON 5/16/2024] acetaminophen (Tylenol) tablet 1,000 mg, 1,000 mg, Oral, Q6HRS PRN, Nalbenja Proctor, D.O.    oxyCODONE immediate-release (Roxicodone) tablet 5 mg, 5 mg, Oral, Q4HRS PRN, Vickie Proctor, D.O.    oxyCODONE immediate release (Roxicodone) tablet 10 mg, 10 mg, Oral, Q4HRS PRN, Nalbenja Proctor, D.O.    ondansetron (Zofran) syringe/vial injection 4 mg, 4 mg, Intravenous, Q6HRS PRN **OR** ondansetron (Zofran ODT) dispertab 4 mg, 4 mg, Oral, Q6HRS PRN, DA HsiehO.    diphenhydrAMINE (Benadryl) tablet/capsule 25 mg, 25 mg, Oral, Q6HRS PRN **OR** diphenhydrAMINE (Benadryl) injection 25 mg, 25 mg, Intravenous, Q6HRS PRN, JUAN Hsieh.O.    docusate sodium (Colace) capsule 100 mg, 100 mg, Oral, BID PRN, JUAN Hsieh.O.    prenatal plus vitamin (Stuartnatal 1+1) 27-1 MG tablet 1 Tablet, 1 Tablet, Oral, Daily-0800, Vickie Proctor D.O.      Lab:   Recent Results (from the past 48 hour(s))   CBC with Differential    Collection Time: 05/12/24  8:15 AM   Result Value Ref Range    WBC 10.5 4.8  - 10.8 K/uL    RBC 4.26 4.20 - 5.40 M/uL    Hemoglobin 10.6 (L) 12.0 - 16.0 g/dL    Hematocrit 34.4 (L) 37.0 - 47.0 %    MCV 80.8 (L) 81.4 - 97.8 fL    MCH 24.9 (L) 27.0 - 33.0 pg    MCHC 30.8 (L) 32.2 - 35.5 g/dL    RDW 49.1 35.9 - 50.0 fL    Platelet Count 266 164 - 446 K/uL    MPV 9.7 9.0 - 12.9 fL    Neutrophils-Polys 71.30 44.00 - 72.00 %    Lymphocytes 20.80 (L) 22.00 - 41.00 %    Monocytes 5.10 0.00 - 13.40 %    Eosinophils 1.00 0.00 - 6.90 %    Basophils 0.40 0.00 - 1.80 %    Immature Granulocytes 1.40 (H) 0.00 - 0.90 %    Nucleated RBC 0.30 (H) 0.00 - 0.20 /100 WBC    Neutrophils (Absolute) 7.51 (H) 1.82 - 7.42 K/uL    Lymphs (Absolute) 2.19 1.00 - 4.80 K/uL    Monos (Absolute) 0.54 0.00 - 0.85 K/uL    Eos (Absolute) 0.11 0.00 - 0.51 K/uL    Baso (Absolute) 0.04 0.00 - 0.12 K/uL    Immature Granulocytes (abs) 0.15 (H) 0.00 - 0.11 K/uL    NRBC (Absolute) 0.03 K/uL   T.PALLIDUM AB EMERALD (Syphilis)    Collection Time: 24  8:15 AM   Result Value Ref Range    Syphilis, Treponemal Qual Non-Reactive Non-Reactive   COD - Adult (Type and Screen)    Collection Time: 24  8:15 AM   Result Value Ref Range    ABO Grouping Only O     Rh Grouping Only POS     Antibody Screen-Cod NEG    CBC without differential- Once in 8 hours post delivery    Collection Time: 24  9:51 PM   Result Value Ref Range    WBC 13.6 (H) 4.8 - 10.8 K/uL    RBC 3.59 (L) 4.20 - 5.40 M/uL    Hemoglobin 9.0 (L) 12.0 - 16.0 g/dL    Hematocrit 30.1 (L) 37.0 - 47.0 %    MCV 83.8 81.4 - 97.8 fL    MCH 25.1 (L) 27.0 - 33.0 pg    MCHC 29.9 (L) 32.2 - 35.5 g/dL    RDW 51.4 (H) 35.9 - 50.0 fL    Platelet Count 229 164 - 446 K/uL    MPV 9.1 9.0 - 12.9 fL       A/P: 38 y.o.  POD1 s/p repeat  delivery @ 39w0d.  - Doing well postpartum, advance diet and monitor for signs of return of bowel function. Encouraged ambulation, cont routine postop care including shower with removal of pressure dressing.  - infant: doing well at bedside, pt  is attempting to breastfeed. Counseled that continuing to offer breast will stimulate milk production; appreciate lactation consultation.  - Rh+/RI  - contraception: not discussed  - ppx: ambulation  - Postpartum anemia- start iron supplementation    Disposition: anticipate discharge on POD 2 or 3    Pratibha Arshad M.D.

## 2024-05-13 NOTE — PROGRESS NOTES
1900- Received report from Argelia PATRICIO. Assumed care. 12 hour chart check, MAR and orders reviewed.      2045- Assessment complete. Fundus firm at umbilicus and palpable, lochia light to scant rubra. Pain management and interventions discussed with pt. SO at bedside. POC discussed. All questions and concerns discussed. No further concerns.

## 2024-05-14 ENCOUNTER — APPOINTMENT (OUTPATIENT)
Dept: RADIOLOGY | Facility: MEDICAL CENTER | Age: 39
End: 2024-05-14
Payer: MEDICAID

## 2024-05-14 PROCEDURE — 93971 EXTREMITY STUDY: CPT | Mod: 26,LT | Performed by: INTERNAL MEDICINE

## 2024-05-14 RX ORDER — POLYETHYLENE GLYCOL 3350 17 G/17G
1 POWDER, FOR SOLUTION ORAL ONCE
Status: COMPLETED | OUTPATIENT
Start: 2024-05-14 | End: 2024-05-14

## 2024-05-14 RX ORDER — AMOXICILLIN 250 MG
1 CAPSULE ORAL DAILY
Status: DISCONTINUED | OUTPATIENT
Start: 2024-05-14 | End: 2024-05-15 | Stop reason: HOSPADM

## 2024-05-14 RX ADMIN — OXYCODONE 5 MG: 5 TABLET ORAL at 20:51

## 2024-05-14 RX ADMIN — SENNOSIDES AND DOCUSATE SODIUM 1 TABLET: 50; 8.6 TABLET ORAL at 11:58

## 2024-05-14 RX ADMIN — IBUPROFEN 800 MG: 800 TABLET, FILM COATED ORAL at 05:40

## 2024-05-14 RX ADMIN — ACETAMINOPHEN 1000 MG: 500 TABLET, FILM COATED ORAL at 05:40

## 2024-05-14 RX ADMIN — FERROUS SULFATE TAB 325 MG (65 MG ELEMENTAL FE) 325 MG: 325 (65 FE) TAB at 08:06

## 2024-05-14 RX ADMIN — PRENATAL WITH FERROUS FUM AND FOLIC ACID 1 TABLET: 3080; 920; 120; 400; 22; 1.84; 3; 20; 10; 1; 12; 200; 27; 25; 2 TABLET ORAL at 08:05

## 2024-05-14 RX ADMIN — POLYETHYLENE GLYCOL 3350 1 PACKET: 17 POWDER, FOR SOLUTION ORAL at 02:25

## 2024-05-14 RX ADMIN — ACETAMINOPHEN 1000 MG: 500 TABLET, FILM COATED ORAL at 17:58

## 2024-05-14 RX ADMIN — ACETAMINOPHEN 1000 MG: 500 TABLET, FILM COATED ORAL at 11:55

## 2024-05-14 RX ADMIN — IBUPROFEN 800 MG: 800 TABLET, FILM COATED ORAL at 14:08

## 2024-05-14 ASSESSMENT — PAIN DESCRIPTION - PAIN TYPE
TYPE: ACUTE PAIN
TYPE: ACUTE PAIN;SURGICAL PAIN
TYPE: ACUTE PAIN
TYPE: ACUTE PAIN;SURGICAL PAIN
TYPE: ACUTE PAIN

## 2024-05-14 NOTE — PROGRESS NOTES
Obstetrics & Gynecology Post-Delivery Progress Note    Date of Service  24     38 y.o.  s/p  for repeat  Delivery date: 2024       Subjective  Pt started to notice increased swelling in her left arm and leg yesterday, more so in the leg. She denies any pain and has been ambulating. Reports has never had this in the past. Also reports onset of sharp left sided chest pain yesterday afternoon when she was carrying her baby;  has been carrying him on the left side. Also has had mild lightheadedness/dizziness at times. Pain is otherwise well controlled, reports minimal bleeding and has been eating/drinking and voiding normally. Has had a BM.     Pain: Well controlled  Bleeding: lochia minimal  Tolerating PO: yes  Voiding: without difficulty  Ambulating: yes  Passing flatus: Yes  Feeding: breastfeeding well      Objective  24hr VS:  Temp:  [36.8 °C (98.2 °F)-36.9 °C (98.4 °F)] 36.9 °C (98.4 °F)  Pulse:  [71-88] 71  Resp:  [15-18] 18  BP: (110-126)/(61-75) 110/61  SpO2:  [90 %-98 %] 90 %    Physical Exam  Gen: well appearing, no apparent distress, resting comfortably in bed  CV: rrr, no m/r/g  Resp: CTAB, symmetric breath sounds  Abd: soft, nontender, nondistended  Fundus: firm and below umbilicus  Incision: dressing clean, dry, intact, healing well, no significant drainage, and no dehiscence  Ext: symmetric, no peripheral edema, calves nontender    Labs:  Lab Results   Component Value Date/Time    WBC 13.6 (H) 2024 09:51 PM    RBC 3.59 (L) 2024 09:51 PM    HEMOGLOBIN 9.0 (L) 2024 09:51 PM    HEMATOCRIT 30.1 (L) 2024 09:51 PM    MCV 83.8 2024 09:51 PM    MCH 25.1 (L) 2024 09:51 PM    MCHC 29.9 (L) 2024 09:51 PM    MPV 9.1 2024 09:51 PM    NEUTSPOLYS 71.30 2024 08:15 AM    LYMPHOCYTES 20.80 (L) 2024 08:15 AM    MONOCYTES 5.10 2024 08:15 AM    EOSINOPHILS 1.00 2024 08:15 AM    BASOPHILS 0.40 2024 08:15 AM    HYPOCHROMIA  1+ 06/10/2009 11:25 AM    ANISOCYTOSIS 2024 12:33 AM         Medications  ferrous sulfate, 325 mg, Oral, QDAY with Breakfast  ibuprofen, 800 mg, Oral, Q8HRS  acetaminophen, 1,000 mg, Oral, Q6HRS  prenatal plus vitamin, 1 Tablet, Oral, Daily-0800      PRN medications: simethicone, ibuprofen **FOLLOWED BY** [START ON 2024] ibuprofen, oxytocin, LR, acetaminophen **FOLLOWED BY** [START ON 2024] acetaminophen, oxyCODONE immediate-release, oxyCODONE immediate release, ondansetron **OR** ondansetron, diphenhydrAMINE **OR** diphenhydrAMINE, docusate sodium      Assessment/Plan  Angelina Rader is a 38 y.o.yo  postpartum day #2  s/p  for repeat    #Repeat   - Post care: progressing appropriately   - Today's goals: continue to mobilize, have a BM     #Swelling  -Nonpitting edema appreciable in the LLE > RLE, non-tender, no significant swelling in the upper extremities   -Will check doppler US to r/o DVT  -Wells score for PE low, not tachycardic, no pleuritic pain, lung sounds clear on examination   -If US negative, consider compression stockings and conservative management for swelling     #Chest pain  -Likely chest wall strain or musculoskeletal etiology as pain is reproducible along the left chest wall on examination  -Continue ibuprofen, counseled on ergonomics such as holding infant on right side  -Continue to monitor for changes; if worsening, will pursue further w/u     #Postpartum anemia  -Hgb 9.0 following delivery  -Likely symptomatic with mild lightheadedness  -Continue iron supplementation daily     #Poor prenatal care  -Social work consultation was placed for history of minimal prenatal care; MOB explained that she moved from [place] to [place] and could not get her medicaid switched to [place] Medicaid and that is why she did not have consistent prenatal care. This is her 6th child and her only concern is MOB to f/u with her [place] for a new car seat. She does have a car seat  already, but would like a newer one.   -Infant cleared with MOB for discharge     VTE prophylaxis: SCDs, ambulation     - Disposition: likely home PPD3-4     Vickie Proctor D.O.  PGY-1, [facility]

## 2024-05-14 NOTE — CARE PLAN
The patient is Stable - Low risk of patient condition declining or worsening    Shift Goals  Clinical Goals: VSS, Fundus firm with light, pain control  Patient Goals:  Family Goals:     Progress made toward(s) clinical / shift goals:  VSS. Fundus firm with scant lochia. Patient not wearing pad, mother encouraged to wear pad as bleeding may resume and can continue for few weeks. Pain being controlled with scheduled tylenol and motrin.     Patient is not progressing towards the following goals:

## 2024-05-14 NOTE — DISCHARGE SUMMARY
Discharge Summary:     Date of Admission: 2024  Date of Discharge: 05/15/24    Admitting diagnosis:    1. Pregnancy @ 39w0d  2. History of  x 3   3. Minimal prenatal care   4. Maternal obesity   5. AMA     Discharge Diagnosis:   1. Status post  for repeat.  2. Post-partum anemia   3. Same    Past Medical History:   Diagnosis Date    Hypertension     Does not take medications for hypertension.     OB History    Para Term  AB Living   8 6 6   2 6   SAB IAB Ectopic Molar Multiple Live Births   2       0 6      # Outcome Date GA Lbr Yuan/2nd Weight Sex Delivery Anes PTL Lv   8 Term 24 39w0d  4.57 kg (10 lb 1.2 oz) M CS-LTranv Spinal N ALICE   7 Term 22 39w0d  4.59 kg (10 lb 1.9 oz) M CS-LTranv Spinal N ALICE   6 Term 19 39w3d  3.81 kg (8 lb 6.4 oz) M CS-LTranv Spinal N ALICE   5 Term      CS-LTranv   ALICE   4 SAB               Birth Comments: Pt. states needed D/C   3 Term 06/10/09 39w0d  3.827 kg (8 lb 7 oz) F Vag-Spont EPI N ALICE      Birth Comments: Pt. states epidural was put in but only half of her body was knumb.    2 Term 08 39w0d  3.898 kg (8 lb 9.5 oz) F Vag-Spont EPI  ALICE      Birth Comments: Pt. states had preeclampsia.    1 SAB  4w0d             Birth Comments: Pt. states no D/C needed.      Past Surgical History:   Procedure Laterality Date    REPEAT C SECTION N/A 2024    Procedure: REPEAT  SECTION;  Surgeon: Dino Mancera M.D.;  Location: [facility] LAB[facility] DELIV[facility];  Service: Labor and Delivery    REPEAT C SECTION Bilateral 2022    Procedure:  SECTION, REPEAT;  Surgeon: Regino Cook M.D.;  Location: SURGERY LABOR [facility] DELIVERY;  Service: Labor and Delivery    REPEAT C SECTION N/A 2019    Procedure:  SECTION, REPEAT;  Surgeon: Regino Cook M.D.;  Location: LABOR AND DELIVERY;  Service: Labor and Delivery    PRIMARY C SECTION  2015    Procedure: PRIMARY C SECTION;  Surgeon: Regino Cook  M.D.;  Location: LABOR AND DELIVERY;  Service:     ABDOMINAL EXPLORATION      DILATION AND CURETTAGE      due to sab    CHOLECYSTECTOMY       Patient has no known allergies.    Patient Active Problem List   Diagnosis    History of macrosomia in infant in prior pregnancy, currently pregnant    Supervision of high-risk pregnancy    History of  delivery x 3    AMA (advanced maternal age) multigravida 35+, third trimester    Indication for care in labor or delivery       Hospital Course:   Pt is a 38 y.o. now  who presented on 2024 for scheduled RCS. Pregnancy c/b AMA, maternal obesity, and minimal prenatal care. Single male infant was delivered via RLTCS at 1100. Apgars 8, 9 at 1 and 5 minutes respectively.  ml. Infant macrosomic, history of same in prior pregnancy.     Postpartum course notable for early ambulation, well managed pain, tolerance of diet, spontaneous voiding, and appropriate feeding of infant. She noticed increased swelling in her left leg and arm yesterday and doppler US was obtained for her left lower extremity and negative for DVT. I could not appreciate any swelling her upper extremities and she had no pain. This improved with conservative management (ambulation, stockings, raising legs). She has remained afebrile and blood pressure has been well controlled. All maternal questions and concerns addressed. Started on iron supplementation for post partum anemia. Social work consultation was placed for history of minimal prenatal care; MOB explained that [place] moved [place] to [place] and could not get her medicaid switched to [place] Medicaid and that is why she did not have consistent prenatal care. This is her 6th child and her only concern is MOB to f/u with her [place] for a new car seat. She does have a car seat already, but would like a newer one. She had no other concerns. Vitals stable.     Physical Exam:  Temp:  [36.4 °C (97.6 °F)-36.6 °C (97.9 °F)] 36.4 °C (97.6  °F)  Pulse:  [74-84] 74  Resp:  [18] 18  BP: (114-124)/(71-77) 114/71  SpO2:  [94 %-97 %] 97 %  Physical Exam  General: well appearing, no apparent distress  Abdomen: soft, nontender, nondistended  Fundus: firm at level below umbilicus  Incision: dressing clean, dry, intact, healing well, and no significant drainage, no tenderness   Perineum: Deferred  Extremities: mild nonpitting edema LLE>RLE, calves nontender, no skin changes     Current Facility-Administered Medications   Medication Dose    senna-docusate (Pericolace Or Senokot S) 8.6-50 MG per tablet 1 Tablet  1 Tablet    ferrous sulfate tablet 325 mg  325 mg    simethicone (Mylicon) chewable tablet 125 mg  125 mg    ibuprofen (Motrin) tablet 800 mg  800 mg    Followed by    [START ON 5/16/2024] ibuprofen (Motrin) tablet 800 mg  800 mg    lactated ringers (LR) infusion      oxytocin (Pitocin) infusion (for post delivery)  125 mL/hr    oxytocin (Pitocin) injection 10 Units  10 Units    lactated ringers infusion      lactated ringers infusion      acetaminophen (Tylenol) tablet 1,000 mg  1,000 mg    Followed by    [START ON 5/16/2024] acetaminophen (Tylenol) tablet 1,000 mg  1,000 mg    oxyCODONE immediate-release (Roxicodone) tablet 5 mg  5 mg    oxyCODONE immediate release (Roxicodone) tablet 10 mg  10 mg    ondansetron (Zofran) syringe/vial injection 4 mg  4 mg    Or    ondansetron (Zofran ODT) dispertab 4 mg  4 mg    diphenhydrAMINE (Benadryl) tablet/capsule 25 mg  25 mg    Or    diphenhydrAMINE (Benadryl) injection 25 mg  25 mg    docusate sodium (Colace) capsule 100 mg  100 mg    prenatal plus vitamin (Stuartnatal 1+1) 27-1 MG tablet 1 Tablet  1 Tablet       Recent Labs     05/12/24  2151   WBC 13.6*   RBC 3.59*   HEMOGLOBIN 9.0*   HEMATOCRIT 30.1*   MCV 83.8   MCH 25.1*   MCHC 29.9*   RDW 51.4*   PLATELETCT 229   MPV 9.1       Activity/ Discharge Instructions::   Discharge to home  Pelvic Rest x 6 weeks  No heavy lifting x4 weeks  Call or come to ED for:  heavy vaginal bleeding, fever >100.4, severe abdominal pain, severe headache, chest pain, shortness of breath,  N/V, incisional drainage, or other concerns.  Staples removed and replaced with steri strips and benzoin POD #3    Follow up:  1 week for incision check for  delivery.   Kati Ponce M.D.  [address] [facility] [address]  [place] [id number]  [phone]    Go on 2024  at 1:30pm for wound check       Discharge Meds:   Current Outpatient Medications   Medication Sig Dispense Refill    docusate sodium 100 MG Cap Take 1 capsule by mouth 2 times a day as needed for Constipation. 30 Capsule 0    ferrous sulfate 325 (65 Fe) MG tablet Take 1 Tablet by mouth every morning with breakfast. 30 Tablet 0    ibuprofen (MOTRIN) 800 MG Tab Take 1 Tablet by mouth every 8 hours. 30 Tablet 0    acetaminophen (TYLENOL) 500 MG Tab Take 1-2 Tablets by mouth every 6 hours. 30 Tablet 0    oxyCODONE immediate-release (ROXICODONE) 5 MG Tab Take 1 Tablet by mouth every four hours as needed for Severe Pain for up to 7 days. 15 Tablet 0       Vickie Proctor D.O.  PGY-1, [facility]

## 2024-05-14 NOTE — PROGRESS NOTES
Received bedside report from SINTIA Eckert. Patient in bed, declines pain at this time. Whiteboards updated, POC discussed. Pain being controlled with scheduled tylenol and motrin. Patient states she does not do well nor wants narcotics. Patient with complaints of unable to have bowel movement and gas pain. Patient encouraged to ambulate in hallway, simethicone and mint tea given. Call light within reach. Patient encouraged to call with any needs and or concerns.     2337: Patient requesting laxative. MARILUZ Varela contacted. Orders received for a one time dose of miralax and to encourage patient to ambulate and increase PO intake.

## 2024-05-14 NOTE — CARE PLAN
The patient is Stable - Low risk of patient condition declining or worsening    Shift Goals  Clinical Goals: To have adequate pain control; ambulate  Patient Goals: pain control  Family Goals:     Progress made toward(s) clinical / shift goals:      Problem: Pain - Standard  Goal: Alleviation of pain or a reduction in pain to the patient’s comfort goal  Outcome: Progressing  Note: Pain is well controlled.     Problem: Altered Physiologic Condition  Goal: Patient physiologically stable as evidenced by normal lochia, palpable uterine involution and vitals within normal limits  Outcome: Progressing     Problem: Early Mobilization - Post Surgery  Goal: Early mobilization post surgery  Outcome: Progressing  Note: The patient ambulates inside her room. Encouraged to walk in the hallway.       Patient is not progressing towards the following goals:

## 2024-05-14 NOTE — CARE PLAN
Problem: Altered Physiologic Condition  Goal: Patient physiologically stable as evidenced by normal lochia, palpable uterine involution and vitals within normal limits  Outcome: Progressing     Problem: Infection - Postpartum  Goal: Postpartum patient will be free of signs and symptoms of infection  Outcome: Progressing   The patient is Stable - Low risk of patient condition declining or worsening    Shift Goals  Clinical Goals: VSS, Fundus firm with light, pain control  Patient Goals: pain control  Family Goals: support and baby bonding    Progress made toward(s) clinical / shift goals:  Vital signs stable    Patient is not progressing towards the following goals:

## 2024-05-15 VITALS
OXYGEN SATURATION: 97 % | HEART RATE: 74 BPM | HEIGHT: 65 IN | TEMPERATURE: 97.6 F | BODY MASS INDEX: 35.82 KG/M2 | DIASTOLIC BLOOD PRESSURE: 71 MMHG | SYSTOLIC BLOOD PRESSURE: 114 MMHG | RESPIRATION RATE: 18 BRPM | WEIGHT: 215 LBS

## 2024-05-15 PROCEDURE — RXMED WILLOW AMBULATORY MEDICATION CHARGE

## 2024-05-15 RX ORDER — PSEUDOEPHEDRINE HCL 30 MG
100 TABLET ORAL 2 TIMES DAILY PRN
Qty: 30 CAPSULE | Refills: 0 | Status: SHIPPED | OUTPATIENT
Start: 2024-05-15 | End: 2024-05-21

## 2024-05-15 RX ORDER — ACETAMINOPHEN 500 MG
500-1000 TABLET ORAL EVERY 6 HOURS
Qty: 30 TABLET | Refills: 0 | Status: SHIPPED | OUTPATIENT
Start: 2024-05-15

## 2024-05-15 RX ORDER — OXYCODONE HYDROCHLORIDE 5 MG/1
5 TABLET ORAL EVERY 4 HOURS PRN
Qty: 15 TABLET | Refills: 0 | Status: SHIPPED | OUTPATIENT
Start: 2024-05-15 | End: 2024-05-29

## 2024-05-15 RX ORDER — IBUPROFEN 800 MG/1
800 TABLET ORAL EVERY 8 HOURS
Qty: 30 TABLET | Refills: 0 | Status: SHIPPED | OUTPATIENT
Start: 2024-05-15

## 2024-05-15 RX ORDER — FERROUS SULFATE 325(65) MG
325 TABLET ORAL
Qty: 30 TABLET | Refills: 0 | Status: SHIPPED | OUTPATIENT
Start: 2024-05-15

## 2024-05-15 RX ADMIN — PRENATAL WITH FERROUS FUM AND FOLIC ACID 1 TABLET: 3080; 920; 120; 400; 22; 1.84; 3; 20; 10; 1; 12; 200; 27; 25; 2 TABLET ORAL at 07:51

## 2024-05-15 RX ADMIN — OXYCODONE 5 MG: 5 TABLET ORAL at 04:14

## 2024-05-15 RX ADMIN — IBUPROFEN 800 MG: 800 TABLET, FILM COATED ORAL at 07:51

## 2024-05-15 RX ADMIN — ACETAMINOPHEN 1000 MG: 500 TABLET, FILM COATED ORAL at 00:21

## 2024-05-15 RX ADMIN — ACETAMINOPHEN 1000 MG: 500 TABLET, FILM COATED ORAL at 05:57

## 2024-05-15 RX ADMIN — IBUPROFEN 800 MG: 800 TABLET, FILM COATED ORAL at 00:22

## 2024-05-15 RX ADMIN — ACETAMINOPHEN 1000 MG: 500 TABLET, FILM COATED ORAL at 11:53

## 2024-05-15 RX ADMIN — SENNOSIDES AND DOCUSATE SODIUM 1 TABLET: 50; 8.6 TABLET ORAL at 07:54

## 2024-05-15 RX ADMIN — FERROUS SULFATE TAB 325 MG (65 MG ELEMENTAL FE) 325 MG: 325 (65 FE) TAB at 07:51

## 2024-05-15 ASSESSMENT — PAIN DESCRIPTION - PAIN TYPE
TYPE: ACUTE PAIN;SURGICAL PAIN

## 2024-05-15 NOTE — CARE PLAN
Problem: Knowledge Deficit - L&D  Goal: Patient and family/caregivers will demonstrate understanding of plan of care, disease process/condition, diagnostic tests and medications  Outcome: Progressing   Patient verbalizes POC, all questions answered     Problem: Risk for Venous Thromboembolism (VTE)  Goal: VTE prevention measures will be implemented and patient will remain free from VTE  Outcome: Progressing  Increase in ambulation, denies calf tenderness      The patient is Stable - Low risk of patient condition declining or worsening    Shift Goals  Clinical Goals: Maintain acceptable pain level  Patient Goals: Healthy Mom/Healthy [place]  Family Goals: bonding

## 2024-05-15 NOTE — CARE PLAN
The patient is Stable - Low risk of patient condition declining or worsening    Shift Goals  Clinical Goals: Pain control  Patient Goals: Healthy Mom/Healthy   Family Goals: bonding    Progress made toward(s) clinical / shift goals:  Pain well controlled     Patient is not progressing towards the following goals:

## 2024-05-15 NOTE — PROGRESS NOTES
2000:Assessment completed, fundus firm, lochia light, abdominal incision with staples MARICRUZ, plan of care reviewed, verbalized understanding, denies pain at this time. Will call if pain medication intervention is needed.

## 2024-05-15 NOTE — DISCHARGE PLANNING
Meds-to-Beds: Discharge prescription orders listed below delivered to patient's bedside. RN notified. Written information regarding the dispensed prescriptions was provided to the patient including the phone number of the pharmacy to call for any questions. Patient elected to have co-payment billed to credit card.    Current Outpatient Medications   Medication Sig Dispense Refill    docusate sodium 100 MG Cap Take 1 capsule by mouth 2 times a day as needed for Constipation. 30 Capsule 0    ferrous sulfate 325 (65 Fe) MG tablet Take 1 Tablet by mouth every morning with breakfast. 30 Tablet 0    ibuprofen (MOTRIN) 800 MG Tab Take 1 Tablet by mouth every 8 hours. 30 Tablet 0    acetaminophen (TYLENOL) 500 MG Tab Take 1-2 Tablets by mouth every 6 hours. 30 Tablet 0    oxyCODONE immediate-release (ROXICODONE) 5 MG Tab Take 1 Tablet by mouth every four hours as needed for Severe Pain for up to 7 days. 15 Tablet 0      Celia Eagle, PharmD

## 2024-05-15 NOTE — DISCHARGE INSTRUCTIONS
Pelvic rest x6 weeks  No heavy lifting x4 weeks   Return for follow up visit in 1 week(s) on  for your incision check.  Call or come to ED for: heavy vaginal bleeding, fever >100.4, severe abdominal pain, severe headache, chest pain, shortness of breath,  N/V, incisional drainage, or other concerns.  PATIENT DISCHARGE EDUCATION INSTRUCTION SHEET    REASONS TO CALL YOUR OBSTETRICIAN  Persistent fever, shaking, chills (Temperature higher than 100.4) may indicate you have an infection  Heavy bleeding: soaking more than 1 pad per hour; Passing clots an egg-sized clot or bigger may mean you have an postpartum hemorrhage  Foul odor from vagina or bad smelling or discolored discharge or blood  Breast infection (Mastitis symptoms); breast pain, chills, fever, redness or red streaks, may feel flu like symptoms  Urinary pain, burning or frequency  Incision that is not healing, increased redness, swelling, tenderness or pain, or any pus from episiotomy or  site may mean you have an infection  Redness, swelling, warmth, or painful to touch in the calf area of your leg may mean you have a blood clot  Severe or intensified depression, thoughts or feelings of wanting to hurt yourself or someone else   Pain in chest, obstructed breathing or shortness of breath (trouble catching your breath) may mean you are having a postpartum complication. Call your provider immediately   Headache that does not get better, even after taking medicine, a bad headache with vision changes or pain in the upper right area of your belly may mean you have high blood pressure or post birth preeclampsia. Call your provider immediately    HAND WASHING  All family and friends should wash their hands:  Before and after holding the baby  Before feeding the baby  After using the restroom or changing the baby's diaper    WOUND CARE  Ask your physician for additional care instructions. In general:   Incision:  May shower and pat incision dry    Keep the incision clean and dry  There should not be any opening or pus from the incision  Continue to walk at home 3 times a day   Do NOT lift anything heavier than your baby (over 10 pounds)  Encourage family to help participate in care of the  to allow rest and mom time to heal  Episiotomy/Laceration  May use kim-spray bottle, witch hazel pads and dermaplast spray for comfort  Use kim-spray bottle after urinating to cleanse perineal area  To prevent burning during urination spray kim-water bottle on labial area   Pat perineal area dry until episiotomy/laceration is healed  Continue to use kim-bottle until bleeding stops as needed  If have a 2nd degree laceration or greater, a Sitz bath can offer relief from soreness, burning, and inflammation   Sitz Bath   Sit in 6 inches of warm water and soak laceration as needed until the laceration heals    VAGINAL CARE AND BLEEDING  Nothing inside vagina for 6 weeks:   No sexual intercourse, tampons or douching  Bleeding may continue for 2-4 weeks. Amount and color may vary  Soaking 1 pad or more in an hour for several hours is considered heavy bleeding  Passing large egg sized blood clots can be concerning  If you feel like you have heavy bleeding or are having increasing amount of blood clots call your Obstetrician immediately  If you begin feeling faint upon standing, feeling sick to your stomach, have clammy skin, a really fast heartbeat, have chills, start feeling confused, dizzy, sleepy or weak, or feeling like you're going to faint call your Obstetrician immediately    HYPERTENSION   Preeclampsia or gestational hypertension are types of high blood pressure that only pregnant women can get. It is important for you to be aware of symptoms to seek early intervention and treatment. If you have any of these symptoms immediately call your Obstetrician    Vision changes or blurred vision   Severe headache or pain that is unrelieved with medication and will not  "go away  Persistent pain in upper abdomen or shoulder   Increased swelling of face, feet, or hands  Difficulty breathing or shortness of breath at rest  Urinating less than usual    URINATION AND BOWEL MOVEMENTS  Eating more fiber (bran cereal, fruits, and vegetables) and drinking plenty of fluids will help to avoid constipation  Urinary frequency and urgency after childbirth is normal  If you experience any urinary pain, burning or frequency call your provider    BIRTH CONTROL  It is possible to become pregnant at any time after delivery and while breastfeeding  Plan to discuss a method of birth control with your physician at your post delivery follow up visit    POSTPARTUM BLUES  During the first few days after birth, you may experience a sense of the \"blues\" which may include impatience, irritability or even crying. These feelings come and go quickly. However, as many as 1 in 10 women experience emotional symptoms known as postpartum depression.     POSTPARTUM DEPRESSION    May start as early as the second or third day after delivery or take several weeks or months to develop. Symptoms of \"blues\" are present, but are more intense: Crying spells; loss of appetite; feelings of hopelessness or loss of control; fear of touching the baby; over concern or no concern at all about the baby; little or no concern about your own appearance/caring for yourself; and/or inability to sleep or excessive sleeping. Contact your Obstetrician if you are experiencing any of these symptoms     PREVENTING SHAKEN BABY  If you are angry or stressed, PUT THE BABY IN THE CRIB, step away, take some deep breaths, and wait until you are calm to care for the baby. DO NOT SHAKE THE BABY. You are not alone, call a supporter for help.  [facility] 24/7 crisis call line ([phone]) or (1-636.481.7216)  You can also text them, text \"ANSWER\" (064800)  "

## 2024-05-21 ENCOUNTER — GYNECOLOGY VISIT (OUTPATIENT)
Dept: OBGYN | Facility: CLINIC | Age: 39
End: 2024-05-21
Payer: MEDICAID

## 2024-05-21 VITALS — DIASTOLIC BLOOD PRESSURE: 86 MMHG | BODY MASS INDEX: 33.45 KG/M2 | SYSTOLIC BLOOD PRESSURE: 120 MMHG | WEIGHT: 201 LBS

## 2024-05-21 PROCEDURE — 3079F DIAST BP 80-89 MM HG: CPT | Performed by: NURSE PRACTITIONER

## 2024-05-21 PROCEDURE — 0503F POSTPARTUM CARE VISIT: CPT | Performed by: NURSE PRACTITIONER

## 2024-05-21 PROCEDURE — 3074F SYST BP LT 130 MM HG: CPT | Performed by: NURSE PRACTITIONER

## 2024-05-21 ASSESSMENT — FIBROSIS 4 INDEX: FIB4 SCORE: 0.64

## 2024-05-21 NOTE — PROGRESS NOTES
Incision Check    CC: C/s incision check    HPI: 38 y.o.  s/p  delivery on 24 with Dr. Mancera for repeat c/s here for incision check.   Pt reports doing well with minimal pain.  No fevers.  Denies trouble with urination or BM.  Minimal vaginal bleeding.    Formula feeding going well.    Denies concerns about mood.     /86 (BP Location: Left arm, Patient Position: Sitting, BP Cuff Size: Adult)   Wt 201 lb   LMP 2023   Breastfeeding No   BMI 33.45 kg/m²   Gen: AAO, NAD  Abd: soft, NT, ND, incision C/D/I, healing well    A/P: 38 y.o.  s/p c/s on 24 doing well.   - no signs of postop complications  - no signs of PP depression  - contraception: desires BTL, referral for BTL consult made      RTC for routine postpartum visit in approx 3-4wks

## 2024-05-21 NOTE — PROGRESS NOTES
Pt. here for C/S check.  Delivered on : 5/12/24   Post partum visit on : 6/20/24 with Tamsen   Chaperone offered.   Pt states she does have some stinging here and there.   Phone/Pharmacy Verified

## 2024-05-22 ENCOUNTER — PHARMACY VISIT (OUTPATIENT)
Dept: PHARMACY | Facility: MEDICAL CENTER | Age: 39
End: 2024-05-22
Payer: COMMERCIAL

## 2024-07-29 NOTE — OP REPORT
DATE OF SERVICE:  2024     PREOPERATIVE DIAGNOSES:  Intrauterine pregnancy at 39 and 0/7th weeks with   history of previous  section x3, insufficient prenatal care, suspected   fetal macrosomia.     POSTOPERATIVE DIAGNOSES:  Intrauterine pregnancy at 39 and 0/7th weeks with   history of previous  section x3, insufficient prenatal care, suspected   fetal macrosomia.     SURGEON:  Dino Mancera MD     ASSISTANT:  Vickie Proctor MD     ANESTHESIA:  Spinal.     ANESTHESIOLOGIST:  Luis Lindsey MD     PROCEDURE:  Repeat low transverse uterine  section.     COMPLICATIONS:  None.     DRAINS:  Terry catheter.     SPECIMENS:  Cord gas sent to pathology department.     INDICATIONS:  This patient is a 38-year-old female  8, para 5-0-2-5, at   39 and 0/7th weeks with minimal prenatal care, who is scheduled for repeat    section.  She has had a history of previous  section x3.     FINDINGS:  Cephalic presentation.  Copious amounts of amniotic fluid, which   was clear.  Apgar scores 8 and 9.  Bladder adhesions to the anterior uterus,   very thin lower uterine segment.  Normal ovaries and fallopian tubes.     DESCRIPTION OF PROCEDURE: After adequately being counseled, the patient was   taken to the operating room and placed in supine position.  Spinal anesthetic   was placed.  Fetal heart tones normal before and after placement of spinal.    She was prepped and draped in the usual sterile fashion.  Pfannenstiel skin   incision made over the previous one, taken down to the fascia.  The fascia was   incised with a scalpel and the fascial incision taken laterally on both sides   with Durand scissors.  Rectus fascia dissected off the underlying rectus   muscles both superiorly and inferiorly and rectus muscles were split in the   midline.  Peritoneal cavity was entered by elevating the peritoneum using   hemostats and incising carefully with Metzenbaum scissors.  The  peritoneal   incision was taken superiorly and inferiorly.  Bladder blade placed over the   bladder.  Next, a bladder flap was developed sharply and a bladder blade   replaced over the bladder.  Next, a low transverse uterine incision was made   with a scalpel and the infant was delivered, bulb suctioned.  Umbilical cord   clamped and cut and the infant handed off to pediatrics.  Placenta was removed   from the uterus.  Uterine cavity was cleansed with a moist laparotomy sponge.    Uterus was exteriorized and the uterine incision was closed in 2 layers   using 0 Vicryl in a running locked fashion.  Good hemostasis noted.  Uterus   returned to the abdominal pelvic cavity and the hysterotomy incision was again   checked and found to be normal and hemostatic.  The peritoneal lining was   closed by figure-of-eight stitch of 0 chromic incorporating the rectus muscles   in the midline and the rectus fascia closed using running nonlocked stitch of   0 Vicryl.  Subcutaneous tissues were irrigated and suctioned.  Electrocautery   used for hemostasis.  Several subcuticular stitches of 0 Vicryl were used to   reapproximate subcutaneous tissues and skin was closed using surgical staples.    A pressure dressing was applied.  The patient was taken to recovery room in   good condition.  Clear yellow urine flowing through the Terry.  Surgical   timeout was performed prior to the surgery and instrument counts were found to   be correct after the surgery.        ______________________________  MD ZARINA LUX/CARLITA/RADHA    DD:  05/12/2024 12:04  DT:  05/12/2024 12:32    Job#:  [id number]   Bed/Stretcher in lowest position, wheels locked, appropriate side rails in place/Call bell, personal items and telephone in reach/Instruct patient to call for assistance before getting out of bed/chair/stretcher/Non-slip footwear applied when patient is off stretcher/[place] to call system/Physically safe environment - no spills, clutter or unnecessary equipment/Purposeful proactive rounding/Room/bathroom lighting operational, light cord in reach

## 2024-10-30 NOTE — PROGRESS NOTES
1900: report received from Grisle PATRICIO, assumed care of patient    1930: report to Mercedes JORDAN, orders received.     2100: rn to bs, sve closed/0/-5.  Swabs performed.  Us to bs. Lab to bs.     0030: results reviewed by mercedes jordan, damienm at bs. Discussed poc.  Pt still having uc's. Sve by rn, closed/0/-5, however outer os is open to 1 cm and cervix is soft.  Pt to be an overnight obs.         Case Management Note              ICD-10-CM    1. Acute pancreatitis, unspecified complication status, unspecified pancreatitis type  K85.90       2. Abdominal pain, epigastric  R10.13       3. Nausea and vomiting, unspecified vomiting type  R11.2               General Risk Score: 3   Values used to calculate this score:    Points  Metrics       0        Age: 44       1        Hospital Admissions: 1       2        ED Visits: 2       0        Has Chronic Obstructive Pulmonary Disease: No       0        Has Diabetes: No       0        Has Congestive Heart Failure: No       0        Has Liver Disease: No       0        Has Depression: No       0        Current PCP: ADELIA Angulo       0        Has Medicaid: No        CM reviewed EMR. GI has been consulted. Patient has a pass on his egress. Low RUR-6%. No CM needs indicated at this time. Providers, if needs arise, please consult case management.          10/30/24 2613   Discharge Planning   Patient expects to be discharged to: House   Services At/After Discharge   Transition of Care Consult (CM Consult) N/A   Services At/After Discharge None   [place] Resource Information Provided? No   Mode of Transport at Discharge Other (see comment)  (family)           ___________________________________________   Ashley Gordon RN Case Manager  10/30/2024   3:50 PM

## 2025-02-21 ENCOUNTER — HOSPITAL ENCOUNTER (EMERGENCY)
Facility: MEDICAL CENTER | Age: 40
End: 2025-02-21
Attending: EMERGENCY MEDICINE
Payer: MEDICAID

## 2025-02-21 ENCOUNTER — APPOINTMENT (OUTPATIENT)
Dept: RADIOLOGY | Facility: MEDICAL CENTER | Age: 40
End: 2025-02-21
Attending: EMERGENCY MEDICINE
Payer: MEDICAID

## 2025-02-21 VITALS
SYSTOLIC BLOOD PRESSURE: 104 MMHG | DIASTOLIC BLOOD PRESSURE: 73 MMHG | RESPIRATION RATE: 19 BRPM | TEMPERATURE: 96.9 F | OXYGEN SATURATION: 94 % | BODY MASS INDEX: 32.65 KG/M2 | HEIGHT: 65 IN | HEART RATE: 83 BPM | WEIGHT: 195.99 LBS

## 2025-02-21 DIAGNOSIS — B34.9 VIRAL SYNDROME: ICD-10-CM

## 2025-02-21 DIAGNOSIS — J06.9 UPPER RESPIRATORY TRACT INFECTION, UNSPECIFIED TYPE: ICD-10-CM

## 2025-02-21 LAB
FLUAV RNA SPEC QL NAA+PROBE: NEGATIVE
FLUBV RNA SPEC QL NAA+PROBE: NEGATIVE
RSV RNA SPEC QL NAA+PROBE: NEGATIVE
SARS-COV-2 RNA RESP QL NAA+PROBE: NOTDETECTED

## 2025-02-21 PROCEDURE — 99284 EMERGENCY DEPT VISIT MOD MDM: CPT

## 2025-02-21 PROCEDURE — 71045 X-RAY EXAM CHEST 1 VIEW: CPT

## 2025-02-21 PROCEDURE — 700111 HCHG RX REV CODE 636 W/ 250 OVERRIDE (IP): Mod: JZ,UD | Performed by: EMERGENCY MEDICINE

## 2025-02-21 PROCEDURE — 0241U HCHG SARS-COV-2 COVID-19 NFCT DS RESP RNA 4 TRGT ED POC: CPT

## 2025-02-21 RX ORDER — BENZONATATE 100 MG/1
100 CAPSULE ORAL 3 TIMES DAILY PRN
Qty: 60 CAPSULE | Refills: 0 | Status: SHIPPED | OUTPATIENT
Start: 2025-02-21

## 2025-02-21 RX ORDER — DEXAMETHASONE SODIUM PHOSPHATE 10 MG/ML
10 INJECTION, SOLUTION INTRAMUSCULAR; INTRAVENOUS ONCE
Status: COMPLETED | OUTPATIENT
Start: 2025-02-21 | End: 2025-02-21

## 2025-02-21 RX ADMIN — DEXAMETHASONE SODIUM PHOSPHATE 10 MG: 10 INJECTION, SOLUTION INTRAMUSCULAR; INTRAVENOUS at 10:30

## 2025-02-21 ASSESSMENT — FIBROSIS 4 INDEX: FIB4 SCORE: 0.66

## 2025-02-21 NOTE — ED TRIAGE NOTES
Chief Complaint   Patient presents with    Flu Like Symptoms     Productive cough, runny nose, HA, nausea x 1 week. Pt states her children have had similar symptoms. Given 4 mg Zofran PTA.      Pt ambulatory to triage for above complaint. A&Ox4, GCS 15, speaking in full sentences. Respirations equal and unlabored. NAD.  Appropriate protocols ordered.   Pt educated on triage process and instructed to notify staff of worsening condition.   Pt placed in the lobby in stable condition.     POC cov/flu/rsv in process

## 2025-02-21 NOTE — DISCHARGE INSTRUCTIONS
Your symptoms are very consistent with a viral illness.  Your body should get over this without the need for antibiotics.  Antibiotics will not help in this case as you do not appear to have a bacterial infection.  We have given you some medicine to help you through this, the steroid we gave you should help with your sore throat and tickly throat.  Take the cough medicine as needed.

## 2025-02-21 NOTE — ED PROVIDER NOTES
ED Provider Note    CHIEF COMPLAINT  Chief Complaint   Patient presents with    Flu Like Symptoms     Productive cough, runny nose, HA, nausea x 1 week. Pt states her children have had similar symptoms. Given 4 mg Zofran PTA.        HPI/ROS  LIMITATION TO HISTORY   Select: : None      Angelina Rader is a 39 y.o. female who presents with chief plaint of cough, runny nose, headache and nausea for approximately 1 week.  Patient's family had similar symptoms.  Patient reports cough is minimally productive of yellow-brown sputum.  She denies any associate hemoptysis.  She reports a mild tickle in the back of her throat but denies any sore throat.  She denies any difficulty swallowing.  She felt like there was some mucus stuck at the top of her throat earlier today and this was disconcerting but she has since cleared this.  Patient denies any ongoing difficulty breathing.  Patient denies any associated dysuria urgency or frequency.  She did have 1 episode of vomiting which occurred after a coughing attack.  Emesis was nonbloody nonbilious.  Patient reports that she may have had a fever a few days ago, but denies any currently.  She reports some mild bodyaches.  She denies any neck pain or neck stiffness.  Patient denies any history of IVDU.    PAST MEDICAL HISTORY       SURGICAL HISTORY   has a past surgical history that includes dilation and curettage (); abdominal exploration (); cholecystectomy; primary c section (2015); repeat c section (N/A, 2019); repeat c section (Bilateral, 2022); and repeat c section (N/A, 2024).    FAMILY HISTORY  Family History   Problem Relation Age of Onset    Diabetes Mother     No Known Problems Sister        SOCIAL HISTORY  Social History     Tobacco Use    Smoking status: Former     Current packs/day: 0.00     Types: Cigarettes     Quit date: 2005     Years since quittin.2    Smokeless tobacco: Never    Tobacco comments:     Pt. states quit when  "she found out she was pregnant.    Vaping Use    Vaping status: Never Used   Substance and Sexual Activity    Alcohol use: No    Drug use: No    Sexual activity: Not Currently     Partners: Male     Comment: None       CURRENT MEDICATIONS  Home Medications    **Home medications have not yet been reviewed for this encounter**         ALLERGIES  No Known Allergies    PHYSICAL EXAM  VITAL SIGNS: /67   Pulse 75   Temp 36.1 °C (96.9 °F) (Temporal)   Resp 18   Ht 1.651 m (5' 5\")   Wt 88.9 kg (195 lb 15.8 oz)   LMP 02/17/2025   SpO2 96%   BMI 32.61 kg/m²    Physical Exam  Constitutional:       Appearance: She is well-developed.   HENT:      Head: Normocephalic and atraumatic.      Right Ear: Tympanic membrane normal.      Left Ear: Tympanic membrane normal.      Nose: Nose normal.      Mouth/Throat:      Mouth: Mucous membranes are moist.      Comments: no trismus, no facial swelling, no floor of mouth swelling or submandibular fullness, no stridor. No pharyngeal asymmetry. Nl phonation    Eyes:      Pupils: Pupils are equal, round, and reactive to light.   Cardiovascular:      Rate and Rhythm: Normal rate and regular rhythm.   Pulmonary:      Effort: Pulmonary effort is normal. No accessory muscle usage or respiratory distress.      Breath sounds: Normal breath sounds.   Abdominal:      General: Bowel sounds are normal.      Palpations: Abdomen is soft. There is no mass.      Tenderness: There is no abdominal tenderness.   Musculoskeletal:         General: Normal range of motion.   Skin:     General: Skin is warm.      Capillary Refill: Capillary refill takes less than 2 seconds.   Neurological:      General: No focal deficit present.      Mental Status: She is alert.   Psychiatric:         Mood and Affect: Mood normal. Mood is not anxious.           EKG/LABS  Results for orders placed or performed during the hospital encounter of 02/21/25   POC CoV-2, FLU A/B, RSV by PCR    Collection Time: 02/21/25  8:15 " AM   Result Value Ref Range    POC Influenza A RNA, PCR Negative Negative    POC Influenza B RNA, PCR Negative Negative    POC RSV, by PCR Negative Negative    POC SARS-CoV-2, PCR NotDetected NotDetected       I have independently interpreted this EKG    RADIOLOGY/PROCEDURES   I have independently interpreted the diagnostic imaging associated with this visit and am waiting the final reading from the radiologist.   My preliminary interpretation is as follows: Chest x-ray is unremarkable without any evidence of lobar pneumonia    Radiologist interpretation:  DX-CHEST-PORTABLE (1 VIEW)   Final Result      1.  No acute cardiac or pulmonary abnormalities are identified.            COURSE & MEDICAL DECISION MAKING    ASSESSMENT, COURSE AND PLAN  Care Narrative: Patient here with cc of symptoms that appear likely secondary to viral syndrome.  Her exam is otherwise very reassuring.   Patient oropharyngeal exam is also very reassuring without any evidence of exudate, or deep space abscess.  Patient with normal vitals.  She has no associated urinary symptoms.  She has an entirely benign abdominal exam.  Checking flu, RSV.  Viral labs are unremarkable.  Certainly not tested viral etiology is very likely here given patient's symptoms.  Given that these were negative I did check a chest x-ray to evaluate for possible lobar pneumonia.  Chest x-ray is unremarkable.            DISPOSITION AND DISCUSSIONS      Escalation of care considered, and ultimately not performed: Patient does not appear septic, she did not have any significant shortness of breath or decreased exertional tolerance, my suspicion of vascular etiology is very low, my suspicion of sepsis is very low, therefore basic labs including troponin, blood cultures were deferred        FINAL DIAGNOSIS  1. Viral syndrome  benzonatate (TESSALON) 100 MG Cap      2. Upper respiratory tract infection, unspecified type  benzonatate (TESSALON) 100 MG Cap

## 2025-02-21 NOTE — ED NOTES
"Angelina Rader has been discharged from the Emergency Room.      Discharge instructions, which include signs and symptoms to monitor patient for, as well as detailed information regarding viral syndrome, uspecified upper respiratory infection provided.  Patient verbalizes understanding of follow up care and medication management. All questions and concerns addressed at this time.     Patient provided with education on when to return to the ER and verbally understands with no concerns. Patient advised on setting up MyChart and information provided about patient survey.  Patient leaves ER in no apparent distress. This RN provided education regarding returning to the ER for any new concerns or changes in patient's condition.      /73   Pulse 83   Temp 36.1 °C (96.9 °F) (Temporal)   Resp 19   Ht 1.651 m (5' 5\")   Wt 88.9 kg (195 lb 15.8 oz)   LMP 02/17/2025   SpO2 94%   BMI 32.61 kg/m²    "

## (undated) DEVICE — PENCIL ELECTSURG 10FT HLSTR - WITH BLADE (50EA/CA)

## (undated) DEVICE — KIT  I.V. START (100EA/CA)

## (undated) DEVICE — SET EXTENSION WITH 2 PORTS (48EA/CA) ***PART #2C8610 IS A SUBSTITUTE*****

## (undated) DEVICE — PACK ROOM TURNOVER L&D (12/CA)

## (undated) DEVICE — TUBING CLEARLINK DUO-VENT - C-FLO (48EA/CA)

## (undated) DEVICE — DETERGENT RENUZYME PLUS 10 OZ PACKET (50/BX)

## (undated) DEVICE — WATER IRRIGATION STERILE 1000ML (12EA/CA)

## (undated) DEVICE — GLOVE BIOGEL SZ 7.5 SURGICAL PF LTX - (50PR/BX 4BX/CA)

## (undated) DEVICE — SUTURE 1 CHROMIC CTX ETHICON - (36PK/BX)

## (undated) DEVICE — TAPE CLOTH MEDIPORE 6 INCH - (12RL/CA)

## (undated) DEVICE — CHLORAPREP 26 ML APPLICATOR - ORANGE TINT(25/CA)

## (undated) DEVICE — SUTURE 3-0 VICRYL PLUS CT-1 - 36 INCH (36/BX)

## (undated) DEVICE — PAD GROUNDING PRE-JELLED - (50EA/PK)

## (undated) DEVICE — PACK C-SECTION (2EA/CA)

## (undated) DEVICE — STAPLER SKIN DISP - (6/BX 10BX/CA) VISISTAT

## (undated) DEVICE — CATHETER IV NON-SAFETY 18 GA X 1 1/4 (50/BX 4BX/CA)

## (undated) DEVICE — SUTURE 1 VICRYL PLUS CTX - 36 INCH (36/BX)

## (undated) DEVICE — HEAD HOLDER JUNIOR/ADULT

## (undated) DEVICE — SODIUM CHL IRRIGATION 0.9% 1000ML (12EA/CA)

## (undated) DEVICE — SUTURE 2-0 CHROMIC GUT CT-1 27 (36PK/BX)"

## (undated) DEVICE — SUTURE 3-0 MONOCRYL PLUS PS-1 - 27 INCH (36/BX)

## (undated) DEVICE — BLANKET UNDERBODY FULL ACCES - (5/CA)

## (undated) DEVICE — SOLUTION PLASMA-LYTE PH 7.4 INJ 1000ML  (14EA/CA)

## (undated) DEVICE — DRESSING NON-ADHERING 8 X 3 - (50/BX)

## (undated) DEVICE — SLEEVE, SEQUENTIAL CALF REG

## (undated) DEVICE — GLOVE BIOGEL SZ 7 SURGICAL PF LTX - (50PR/BX 4BX/CA)

## (undated) DEVICE — CANISTER SUCTION 3000ML MECHANICAL FILTER AUTO SHUTOFF MEDI-VAC NONSTERILE LF DISP  (40EA/CA)

## (undated) DEVICE — ELECTRODE DUAL RETURN W/ CORD - (50/PK)

## (undated) DEVICE — SUTURE 0 VICRYL PLUS CT-1 - 36 INCH (36/BX)

## (undated) DEVICE — WATER IRRIG. STER. 1500 ML - (9/CA)

## (undated) DEVICE — PAD LAP STERILE 18 X 18 - (5/PK 40PK/CA)

## (undated) DEVICE — TRAY SPINAL ANESTHESIA NON-SAFETY (10/CA)

## (undated) DEVICE — SPONGE GAUZESTER 4 X 4 4PLY - (128PK/CA)

## (undated) DEVICE — TRAY BLADDER CARE W/ 16 FR FOLEY CATHETER STATLOCK  (10/CA)

## (undated) DEVICE — GLOVE, BIOGEL ECLIPSE, SZ 7.0, PF LTX (50/BX)

## (undated) DEVICE — BAG SPONGE COUNT 10.25 X 32 - BLUE (250/CA)

## (undated) DEVICE — SHEATH RO 4F 10CM (10EA/BX)

## (undated) DEVICE — DRESSING POST OP BORDER 4 X 10 (5EA/BX)